# Patient Record
Sex: FEMALE | Race: WHITE | NOT HISPANIC OR LATINO
[De-identification: names, ages, dates, MRNs, and addresses within clinical notes are randomized per-mention and may not be internally consistent; named-entity substitution may affect disease eponyms.]

---

## 2017-01-10 ENCOUNTER — APPOINTMENT (OUTPATIENT)
Dept: OPHTHALMOLOGY | Facility: CLINIC | Age: 57
End: 2017-01-10

## 2017-06-07 ENCOUNTER — MEDICATION RENEWAL (OUTPATIENT)
Age: 57
End: 2017-06-07

## 2017-07-05 ENCOUNTER — RECORD ABSTRACTING (OUTPATIENT)
Age: 57
End: 2017-07-05

## 2017-07-05 DIAGNOSIS — Z87.898 PERSONAL HISTORY OF OTHER SPECIFIED CONDITIONS: ICD-10-CM

## 2017-07-10 ENCOUNTER — NON-APPOINTMENT (OUTPATIENT)
Age: 57
End: 2017-07-10

## 2017-07-10 ENCOUNTER — LABORATORY RESULT (OUTPATIENT)
Age: 57
End: 2017-07-10

## 2017-07-10 ENCOUNTER — APPOINTMENT (OUTPATIENT)
Dept: INTERNAL MEDICINE | Facility: CLINIC | Age: 57
End: 2017-07-10

## 2017-07-10 LAB
BILIRUB UR QL STRIP: NORMAL
CLARITY UR: NORMAL
GLUCOSE UR-MCNC: NORMAL
HCG UR QL: 0.2 EU/DL
HGB UR QL STRIP.AUTO: NORMAL
KETONES UR-MCNC: 15
LEUKOCYTE ESTERASE UR QL STRIP: NORMAL
NITRITE UR QL STRIP: NORMAL
PH UR STRIP: 5
PROT UR STRIP-MCNC: 30
SP GR UR STRIP: 1.02

## 2017-07-11 ENCOUNTER — APPOINTMENT (OUTPATIENT)
Dept: OPHTHALMOLOGY | Facility: CLINIC | Age: 57
End: 2017-07-11

## 2017-07-18 PROBLEM — M25.569 KNEE PAIN: Status: RESOLVED | Noted: 2017-07-05 | Resolved: 2017-07-18

## 2017-07-20 ENCOUNTER — LABORATORY RESULT (OUTPATIENT)
Age: 57
End: 2017-07-20

## 2017-07-20 ENCOUNTER — APPOINTMENT (OUTPATIENT)
Dept: INTERNAL MEDICINE | Facility: CLINIC | Age: 57
End: 2017-07-20

## 2017-07-20 DIAGNOSIS — M25.569 PAIN IN UNSPECIFIED KNEE: ICD-10-CM

## 2017-07-23 LAB
25(OH)D3 SERPL-MCNC: 17.6 NG/ML
25(OH)D3 SERPL-MCNC: 20.7 NG/ML
ALBUMIN SERPL ELPH-MCNC: 4.4 G/DL
ALBUMIN SERPL ELPH-MCNC: 4.9 G/DL
ALP BLD-CCNC: 80 U/L
ALP BLD-CCNC: 87 U/L
ALT SERPL-CCNC: 23 U/L
ALT SERPL-CCNC: 28 U/L
AMYLASE/CREAT SERPL: 51 U/L
ANION GAP SERPL CALC-SCNC: 14 MMOL/L
ANION GAP SERPL CALC-SCNC: 20 MMOL/L
AST SERPL-CCNC: 17 U/L
AST SERPL-CCNC: 23 U/L
BASOPHILS # BLD AUTO: 0.01 K/UL
BASOPHILS # BLD AUTO: 0.01 K/UL
BASOPHILS NFR BLD AUTO: 0.1 %
BASOPHILS NFR BLD AUTO: 0.2 %
BILIRUB SERPL-MCNC: 0.3 MG/DL
BILIRUB SERPL-MCNC: 0.4 MG/DL
BUN SERPL-MCNC: 23 MG/DL
BUN SERPL-MCNC: 27 MG/DL
CALCIUM SERPL-MCNC: 10.5 MG/DL
CALCIUM SERPL-MCNC: 9.7 MG/DL
CHLORIDE SERPL-SCNC: 102 MMOL/L
CHLORIDE SERPL-SCNC: 98 MMOL/L
CHOLEST SERPL-MCNC: 181 MG/DL
CHOLEST SERPL-MCNC: 191 MG/DL
CHOLEST/HDLC SERPL: 3.9 RATIO
CHOLEST/HDLC SERPL: 4 RATIO
CO2 SERPL-SCNC: 23 MMOL/L
CO2 SERPL-SCNC: 23 MMOL/L
CREAT SERPL-MCNC: 0.93 MG/DL
CREAT SERPL-MCNC: 1.34 MG/DL
EOSINOPHIL # BLD AUTO: 0.07 K/UL
EOSINOPHIL # BLD AUTO: 0.07 K/UL
EOSINOPHIL NFR BLD AUTO: 0.9 %
EOSINOPHIL NFR BLD AUTO: 1.4 %
GLUCOSE SERPL-MCNC: 104 MG/DL
GLUCOSE SERPL-MCNC: 93 MG/DL
HBA1C MFR BLD HPLC: 5.8 %
HBA1C MFR BLD HPLC: 5.8 %
HCT VFR BLD CALC: 43.9 %
HCT VFR BLD CALC: 45.6 %
HDLC SERPL-MCNC: 47 MG/DL
HDLC SERPL-MCNC: 48 MG/DL
HGB BLD-MCNC: 14.7 G/DL
HGB BLD-MCNC: 15.1 G/DL
IMM GRANULOCYTES NFR BLD AUTO: 0.2 %
IMM GRANULOCYTES NFR BLD AUTO: 0.3 %
LDLC SERPL CALC-MCNC: 115 MG/DL
LDLC SERPL CALC-MCNC: 118 MG/DL
LPL SERPL-CCNC: 42 U/L
LYMPHOCYTES # BLD AUTO: 1.24 K/UL
LYMPHOCYTES # BLD AUTO: 1.56 K/UL
LYMPHOCYTES NFR BLD AUTO: 20.9 %
LYMPHOCYTES NFR BLD AUTO: 25.2 %
MAN DIFF?: NORMAL
MAN DIFF?: NORMAL
MCHC RBC-ENTMCNC: 28.2 PG
MCHC RBC-ENTMCNC: 28.2 PG
MCHC RBC-ENTMCNC: 33.1 GM/DL
MCHC RBC-ENTMCNC: 33.5 GM/DL
MCV RBC AUTO: 84.3 FL
MCV RBC AUTO: 85.2 FL
MONOCYTES # BLD AUTO: 0.39 K/UL
MONOCYTES # BLD AUTO: 0.48 K/UL
MONOCYTES NFR BLD AUTO: 6.4 %
MONOCYTES NFR BLD AUTO: 7.9 %
NEUTROPHILS # BLD AUTO: 3.2 K/UL
NEUTROPHILS # BLD AUTO: 5.31 K/UL
NEUTROPHILS NFR BLD AUTO: 65.1 %
NEUTROPHILS NFR BLD AUTO: 71.4 %
PLATELET # BLD AUTO: 266 K/UL
PLATELET # BLD AUTO: 287 K/UL
POTASSIUM SERPL-SCNC: 4.1 MMOL/L
POTASSIUM SERPL-SCNC: 4.5 MMOL/L
PROT SERPL-MCNC: 7.6 G/DL
PROT SERPL-MCNC: 8.5 G/DL
RBC # BLD: 5.21 M/UL
RBC # BLD: 5.35 M/UL
RBC # FLD: 13.8 %
RBC # FLD: 14.3 %
SAVE SPECIMEN: NORMAL
SAVE SPECIMEN: NORMAL
SODIUM SERPL-SCNC: 139 MMOL/L
SODIUM SERPL-SCNC: 141 MMOL/L
T3RU NFR SERPL: 0.95 INDEX
T3RU NFR SERPL: 0.95 INDEX
T4 SERPL-MCNC: 11.3 UG/DL
T4 SERPL-MCNC: 11.7 UG/DL
TRIGL SERPL-MCNC: 125 MG/DL
TRIGL SERPL-MCNC: 94 MG/DL
TSH SERPL-ACNC: 2.22 UIU/ML
TSH SERPL-ACNC: 2.5 UIU/ML
URATE SERPL-MCNC: 6.3 MG/DL
URATE SERPL-MCNC: 6.3 MG/DL
WBC # FLD AUTO: 4.92 K/UL
WBC # FLD AUTO: 7.45 K/UL

## 2017-08-30 ENCOUNTER — APPOINTMENT (OUTPATIENT)
Dept: OPHTHALMOLOGY | Facility: CLINIC | Age: 57
End: 2017-08-30

## 2017-11-17 ENCOUNTER — MEDICATION RENEWAL (OUTPATIENT)
Age: 57
End: 2017-11-17

## 2017-11-17 DIAGNOSIS — F41.9 ANXIETY DISORDER, UNSPECIFIED: ICD-10-CM

## 2017-12-07 ENCOUNTER — RX RENEWAL (OUTPATIENT)
Age: 57
End: 2017-12-07

## 2018-01-16 ENCOUNTER — APPOINTMENT (OUTPATIENT)
Dept: OPHTHALMOLOGY | Facility: CLINIC | Age: 58
End: 2018-01-16
Payer: COMMERCIAL

## 2018-01-16 PROCEDURE — 92012 INTRM OPH EXAM EST PATIENT: CPT

## 2018-01-16 PROCEDURE — 92020 GONIOSCOPY: CPT

## 2018-03-12 ENCOUNTER — MEDICATION RENEWAL (OUTPATIENT)
Age: 58
End: 2018-03-12

## 2018-05-03 ENCOUNTER — MEDICATION RENEWAL (OUTPATIENT)
Age: 58
End: 2018-05-03

## 2018-05-04 ENCOUNTER — MEDICATION RENEWAL (OUTPATIENT)
Age: 58
End: 2018-05-04

## 2018-06-01 ENCOUNTER — RX RENEWAL (OUTPATIENT)
Age: 58
End: 2018-06-01

## 2018-07-17 ENCOUNTER — APPOINTMENT (OUTPATIENT)
Dept: OPHTHALMOLOGY | Facility: CLINIC | Age: 58
End: 2018-07-17
Payer: COMMERCIAL

## 2018-07-17 PROCEDURE — 92014 COMPRE OPH EXAM EST PT 1/>: CPT

## 2018-07-17 PROCEDURE — 92012 INTRM OPH EXAM EST PATIENT: CPT

## 2018-07-17 PROCEDURE — 92020 GONIOSCOPY: CPT

## 2018-08-23 ENCOUNTER — MEDICATION RENEWAL (OUTPATIENT)
Age: 58
End: 2018-08-23

## 2018-08-28 ENCOUNTER — MESSAGE (OUTPATIENT)
Age: 58
End: 2018-08-28

## 2019-01-15 ENCOUNTER — APPOINTMENT (OUTPATIENT)
Dept: OPHTHALMOLOGY | Facility: CLINIC | Age: 59
End: 2019-01-15
Payer: COMMERCIAL

## 2019-01-15 DIAGNOSIS — H25.13 AGE-RELATED NUCLEAR CATARACT, BILATERAL: ICD-10-CM

## 2019-01-15 DIAGNOSIS — H40.039 ANATOMICAL NARROW ANGLE, UNSPECIFIED EYE: ICD-10-CM

## 2019-01-15 PROCEDURE — 92020 GONIOSCOPY: CPT | Mod: NC

## 2019-01-15 PROCEDURE — 92012 INTRM OPH EXAM EST PATIENT: CPT | Mod: NC

## 2019-01-15 PROCEDURE — 92012 INTRM OPH EXAM EST PATIENT: CPT

## 2019-01-16 ENCOUNTER — MEDICATION RENEWAL (OUTPATIENT)
Age: 59
End: 2019-01-16

## 2019-01-29 ENCOUNTER — TRANSCRIPTION ENCOUNTER (OUTPATIENT)
Age: 59
End: 2019-01-29

## 2019-02-21 ENCOUNTER — RECORD ABSTRACTING (OUTPATIENT)
Age: 59
End: 2019-02-21

## 2019-02-26 ENCOUNTER — LABORATORY RESULT (OUTPATIENT)
Age: 59
End: 2019-02-26

## 2019-02-26 ENCOUNTER — APPOINTMENT (OUTPATIENT)
Dept: INTERNAL MEDICINE | Facility: CLINIC | Age: 59
End: 2019-02-26
Payer: COMMERCIAL

## 2019-02-26 ENCOUNTER — NON-APPOINTMENT (OUTPATIENT)
Age: 59
End: 2019-02-26

## 2019-02-26 VITALS — DIASTOLIC BLOOD PRESSURE: 70 MMHG | SYSTOLIC BLOOD PRESSURE: 138 MMHG

## 2019-02-26 VITALS — HEIGHT: 64 IN | BODY MASS INDEX: 44.22 KG/M2 | WEIGHT: 259 LBS

## 2019-02-26 VITALS — SYSTOLIC BLOOD PRESSURE: 120 MMHG | DIASTOLIC BLOOD PRESSURE: 70 MMHG

## 2019-02-26 LAB
BILIRUB UR QL STRIP: NORMAL
CLARITY UR: CLEAR
COLLECTION METHOD: NORMAL
GLUCOSE UR-MCNC: NORMAL
HCG UR QL: 0.2 EU/DL
HGB UR QL STRIP.AUTO: NORMAL
KETONES UR-MCNC: NORMAL
LEUKOCYTE ESTERASE UR QL STRIP: NORMAL
NITRITE UR QL STRIP: NORMAL
PH UR STRIP: 5.5
PROT UR STRIP-MCNC: NORMAL
SP GR UR STRIP: 1.02

## 2019-02-26 PROCEDURE — 99396 PREV VISIT EST AGE 40-64: CPT | Mod: 25

## 2019-02-26 PROCEDURE — 90674 CCIIV4 VAC NO PRSV 0.5 ML IM: CPT

## 2019-02-26 PROCEDURE — G0008: CPT

## 2019-02-26 PROCEDURE — 93000 ELECTROCARDIOGRAM COMPLETE: CPT

## 2019-02-26 PROCEDURE — 81003 URINALYSIS AUTO W/O SCOPE: CPT | Mod: QW

## 2019-02-26 PROCEDURE — 36415 COLL VENOUS BLD VENIPUNCTURE: CPT

## 2019-02-26 NOTE — HISTORY OF PRESENT ILLNESS
[FreeTextEntry1] : This is a 58-year-old female for annual health assessment [de-identified] : specifically we will address her history of hypertension hypercholesterolemia thyroid disorder and anxiety\par \par Patient is feeling quite well except for difficulty with joint pain which has been chronic but increased in.\par \par Additionally she has as difficulty with weight\par \par She has difficulty sleeping because of the joint

## 2019-02-26 NOTE — ASSESSMENT
[FreeTextEntry1] : This is a delightful 58-year-old female whose history has been reviewed above\par \par She has a history of hypertension blood pressure is under excellent control she has no orthostasis no medication changes\par \par She has a history of hypothyroidism she remains on Synthroid she is clinically euthyroid a thyroid profile has been obtained medication changes predicated on the results\par \par She has a history of hypercholesterolemia she remains on a statin for cholesterol profile has been obtained medication changes predicated on the results\par \par She has diffuse joint pain this has been chronic she has a family history of inflammatory disease a basic serologic testing has been obtained. I do think she should see a rheumatologist for baseline evaluation\par \par She is up-to-date with OB/GYN mammography she will be given a flu shot. I told her that she should obtain a new shingle shot. She has declined colonoscopy\par \par Historically she did have pancreatitis she had a stent placed because of a divisum. She has been asymptomatic she had a papilloma of the right breast she does followup appropriately\par \par She will do a FIT test\par \par She is up-to-date with bone density

## 2019-02-26 NOTE — HEALTH RISK ASSESSMENT
[Good] : ~his/her~  mood as  good [Two or more falls in past year] : Patient reported two or more falls in the past year [0] : 1) Little interest or pleasure doing things: Not at all (0) [Hepatitis C test offered] : Hepatitis C test offered [None] : None [Alone] : lives alone [Employed] : employed [High School] : high school [Single] : single [Feels Safe at Home] : Feels safe at home [Fully functional (bathing, dressing, toileting, transferring, walking, feeding)] : Fully functional (bathing, dressing, toileting, transferring, walking, feeding) [Fully functional (using the telephone, shopping, preparing meals, housekeeping, doing laundry, using] : Fully functional and needs no help or supervision to perform IADLs (using the telephone, shopping, preparing meals, housekeeping, doing laundry, using transportation, managing medications and managing finances) [Smoke Detector] : smoke detector [Carbon Monoxide Detector] : carbon monoxide detector [Guns at Home] : guns at home [Seat Belt] :  uses seat belt [Sunscreen] : uses sunscreen [] : No [de-identified] : yes [Change in mental status noted] : No change in mental status noted [Sexually Active] : not sexually active [Reports changes in hearing] : Reports no changes in hearing [Reports changes in vision] : Reports no changes in vision [Reports normal functional visual acuity (ie: able to read med bottle)] : Reports poor functional visual acuity.  [Reports changes in dental health] : Reports no changes in dental health [Safety elements used in home] : no safety elements used in home [Travel to Developing Areas] : does not  travel to developing areas [TB Exposure] : is not being exposed to tuberculosis [Caregiver Concerns] : does not have caregiver concerns

## 2019-02-26 NOTE — PHYSICAL EXAM
[No Acute Distress] : no acute distress [Well Nourished] : well nourished [Well Developed] : well developed [Well-Appearing] : well-appearing [Normal Sclera/Conjunctiva] : normal sclera/conjunctiva [PERRL] : pupils equal round and reactive to light [EOMI] : extraocular movements intact [Normal Outer Ear/Nose] : the outer ears and nose were normal in appearance [Normal Oropharynx] : the oropharynx was normal [No JVD] : no jugular venous distention [Supple] : supple [No Lymphadenopathy] : no lymphadenopathy [Thyroid Normal, No Nodules] : the thyroid was normal and there were no nodules present [No Respiratory Distress] : no respiratory distress  [Clear to Auscultation] : lungs were clear to auscultation bilaterally [No Accessory Muscle Use] : no accessory muscle use [Normal Rate] : normal rate  [Regular Rhythm] : with a regular rhythm [Normal S1, S2] : normal S1 and S2 [No Murmur] : no murmur heard [No Carotid Bruits] : no carotid bruits [No Abdominal Bruit] : a ~M bruit was not heard ~T in the abdomen [No Varicosities] : no varicosities [Pedal Pulses Present] : the pedal pulses are present [No Edema] : there was no peripheral edema [No Extremity Clubbing/Cyanosis] : no extremity clubbing/cyanosis [No Palpable Aorta] : no palpable aorta [Soft] : abdomen soft [Non Tender] : non-tender [Non-distended] : non-distended [No HSM] : no HSM [Normal Bowel Sounds] : normal bowel sounds [Normal Posterior Cervical Nodes] : no posterior cervical lymphadenopathy [Normal Anterior Cervical Nodes] : no anterior cervical lymphadenopathy [No CVA Tenderness] : no CVA  tenderness [No Spinal Tenderness] : no spinal tenderness [No Joint Swelling] : no joint swelling [Grossly Normal Strength/Tone] : grossly normal strength/tone [No Rash] : no rash [Normal Gait] : normal gait [Coordination Grossly Intact] : coordination grossly intact [No Focal Deficits] : no focal deficits [Deep Tendon Reflexes (DTR)] : deep tendon reflexes were 2+ and symmetric [Normal Affect] : the affect was normal [Normal Insight/Judgement] : insight and judgment were intact [No Masses] : no palpable masses [No Nipple Discharge] : no nipple discharge [No Axillary Lymphadenopathy] : no axillary lymphadenopathy [No Stool to Guaiac] : no stool to guaiac [Normal Sphincter Tone] : normal sphincter tone [No Mass] : no mass [de-identified] : overweight

## 2019-02-27 DIAGNOSIS — D75.1 SECONDARY POLYCYTHEMIA: ICD-10-CM

## 2019-02-28 ENCOUNTER — TRANSCRIPTION ENCOUNTER (OUTPATIENT)
Age: 59
End: 2019-02-28

## 2019-03-04 ENCOUNTER — MEDICATION RENEWAL (OUTPATIENT)
Age: 59
End: 2019-03-04

## 2019-03-04 LAB
25(OH)D3 SERPL-MCNC: 15.5 NG/ML
ALBUMIN SERPL ELPH-MCNC: 4.9 G/DL
ALP BLD-CCNC: 86 U/L
ALT SERPL-CCNC: 29 U/L
ANION GAP SERPL CALC-SCNC: 16 MMOL/L
AST SERPL-CCNC: 21 U/L
B BURGDOR IGG+IGM SER QL IB: NORMAL
BASOPHILS # BLD AUTO: 0.03 K/UL
BASOPHILS NFR BLD AUTO: 0.4 %
BILIRUB SERPL-MCNC: 0.4 MG/DL
BUN SERPL-MCNC: 27 MG/DL
CALCIUM SERPL-MCNC: 10.5 MG/DL
CCP AB SER IA-ACNC: <8 UNITS
CHLORIDE SERPL-SCNC: 100 MMOL/L
CHOLEST SERPL-MCNC: 196 MG/DL
CHOLEST/HDLC SERPL: 3.7 RATIO
CO2 SERPL-SCNC: 24 MMOL/L
CREAT SERPL-MCNC: 0.92 MG/DL
EOSINOPHIL # BLD AUTO: 0.11 K/UL
EOSINOPHIL NFR BLD AUTO: 1.5 %
FERRITIN SERPL-MCNC: 227 NG/ML
GLUCOSE SERPL-MCNC: 94 MG/DL
HBA1C MFR BLD HPLC: 5.8 %
HCT VFR BLD CALC: 48.3 %
HCV AB SER QL: NONREACTIVE
HCV S/CO RATIO: 0.09 S/CO
HDLC SERPL-MCNC: 53 MG/DL
HGB BLD-MCNC: 15.7 G/DL
IGA SER QL IEP: 105 MG/DL
IMM GRANULOCYTES NFR BLD AUTO: 0.3 %
LDLC SERPL CALC-MCNC: 118 MG/DL
LYMPHOCYTES # BLD AUTO: 1.41 K/UL
LYMPHOCYTES NFR BLD AUTO: 19.3 %
MAN DIFF?: NORMAL
MCHC RBC-ENTMCNC: 28.2 PG
MCHC RBC-ENTMCNC: 32.5 GM/DL
MCV RBC AUTO: 86.9 FL
MONOCYTES # BLD AUTO: 0.55 K/UL
MONOCYTES NFR BLD AUTO: 7.5 %
NEUTROPHILS # BLD AUTO: 5.2 K/UL
NEUTROPHILS NFR BLD AUTO: 71 %
PLATELET # BLD AUTO: 309 K/UL
POTASSIUM SERPL-SCNC: 4.3 MMOL/L
PROT SERPL-MCNC: 8.2 G/DL
RBC # BLD: 5.56 M/UL
RBC # FLD: 13.5 %
RF+CCP IGG SER-IMP: NEGATIVE
RHEUMATOID FACT SER QL: <10 IU/ML
SAVE SPECIMEN: NORMAL
SODIUM SERPL-SCNC: 140 MMOL/L
T3RU NFR SERPL: 1 TBI
T4 SERPL-MCNC: 11.5 UG/DL
THYROGLOB AB SERPL-ACNC: <20 IU/ML
THYROPEROXIDASE AB SERPL IA-ACNC: <10 IU/ML
TRIGL SERPL-MCNC: 124 MG/DL
TSH SERPL-ACNC: 2.75 UIU/ML
URATE SERPL-MCNC: 5.7 MG/DL
VIT B12 SERPL-MCNC: 861 PG/ML
WBC # FLD AUTO: 7.32 K/UL

## 2019-03-05 LAB
EPO SERPL-MCNC: 4.6 MIU/ML
GLIADIN IGA SER QL: 16.5 UNITS
GLIADIN IGG SER QL: <5 UNITS
GLIADIN PEPTIDE IGA SER-ACNC: NEGATIVE
GLIADIN PEPTIDE IGG SER-ACNC: NEGATIVE
TTG IGA SER IA-ACNC: 1.2 U/ML
TTG IGA SER-ACNC: NEGATIVE
TTG IGG SER IA-ACNC: 4.2 U/ML
TTG IGG SER IA-ACNC: NEGATIVE

## 2019-03-06 LAB
ENDOMYSIUM IGA SER QL: NEGATIVE
ENDOMYSIUM IGA TITR SER: NORMAL

## 2019-03-26 ENCOUNTER — RX RENEWAL (OUTPATIENT)
Age: 59
End: 2019-03-26

## 2019-07-02 ENCOUNTER — MEDICATION RENEWAL (OUTPATIENT)
Age: 59
End: 2019-07-02

## 2019-07-16 ENCOUNTER — NON-APPOINTMENT (OUTPATIENT)
Age: 59
End: 2019-07-16

## 2019-07-16 ENCOUNTER — APPOINTMENT (OUTPATIENT)
Dept: OPHTHALMOLOGY | Facility: CLINIC | Age: 59
End: 2019-07-16

## 2019-09-19 ENCOUNTER — APPOINTMENT (OUTPATIENT)
Dept: INTERNAL MEDICINE | Facility: CLINIC | Age: 59
End: 2019-09-19
Payer: COMMERCIAL

## 2019-09-19 ENCOUNTER — LABORATORY RESULT (OUTPATIENT)
Age: 59
End: 2019-09-19

## 2019-09-19 PROCEDURE — 36415 COLL VENOUS BLD VENIPUNCTURE: CPT

## 2019-09-19 PROCEDURE — 77080 DXA BONE DENSITY AXIAL: CPT

## 2019-09-24 LAB
25(OH)D3 SERPL-MCNC: 24.9 NG/ML
ALBUMIN SERPL ELPH-MCNC: 4.6 G/DL
ALP BLD-CCNC: 78 U/L
ALT SERPL-CCNC: 31 U/L
ANION GAP SERPL CALC-SCNC: 13 MMOL/L
AST SERPL-CCNC: 20 U/L
BASOPHILS # BLD AUTO: 0.01 K/UL
BASOPHILS NFR BLD AUTO: 0.2 %
BILIRUB SERPL-MCNC: 0.3 MG/DL
BUN SERPL-MCNC: 21 MG/DL
CALCIUM SERPL-MCNC: 10 MG/DL
CHLORIDE SERPL-SCNC: 101 MMOL/L
CHOLEST SERPL-MCNC: 178 MG/DL
CHOLEST/HDLC SERPL: 3.5 RATIO
CO2 SERPL-SCNC: 27 MMOL/L
CREAT SERPL-MCNC: 0.98 MG/DL
EOSINOPHIL # BLD AUTO: 0.08 K/UL
EOSINOPHIL NFR BLD AUTO: 1.7 %
ESTIMATED AVERAGE GLUCOSE: 117 MG/DL
GLUCOSE SERPL-MCNC: 96 MG/DL
HBA1C MFR BLD HPLC: 5.7 %
HCT VFR BLD CALC: 46.1 %
HDLC SERPL-MCNC: 51 MG/DL
HGB BLD-MCNC: 14.6 G/DL
IMM GRANULOCYTES NFR BLD AUTO: 0.4 %
LDLC SERPL CALC-MCNC: 104 MG/DL
LYMPHOCYTES # BLD AUTO: 1.23 K/UL
LYMPHOCYTES NFR BLD AUTO: 25.8 %
MAN DIFF?: NORMAL
MCHC RBC-ENTMCNC: 27.4 PG
MCHC RBC-ENTMCNC: 31.7 GM/DL
MCV RBC AUTO: 86.5 FL
MONOCYTES # BLD AUTO: 0.42 K/UL
MONOCYTES NFR BLD AUTO: 8.8 %
NEUTROPHILS # BLD AUTO: 3 K/UL
NEUTROPHILS NFR BLD AUTO: 63.1 %
PLATELET # BLD AUTO: 276 K/UL
POTASSIUM SERPL-SCNC: 4.4 MMOL/L
PROT SERPL-MCNC: 7.4 G/DL
RBC # BLD: 5.33 M/UL
RBC # FLD: 13.2 %
SAVE SPECIMEN: NORMAL
SODIUM SERPL-SCNC: 141 MMOL/L
T3RU NFR SERPL: 0.9 TBI
T4 SERPL-MCNC: 11.3 UG/DL
TRIGL SERPL-MCNC: 115 MG/DL
TSH SERPL-ACNC: 1.86 UIU/ML
URATE SERPL-MCNC: 6 MG/DL
WBC # FLD AUTO: 4.76 K/UL

## 2019-10-21 ENCOUNTER — APPOINTMENT (OUTPATIENT)
Dept: INTERNAL MEDICINE | Facility: CLINIC | Age: 59
End: 2019-10-21
Payer: COMMERCIAL

## 2019-10-21 PROCEDURE — 90686 IIV4 VACC NO PRSV 0.5 ML IM: CPT

## 2019-10-21 PROCEDURE — G0008: CPT

## 2019-11-23 ENCOUNTER — INPATIENT (INPATIENT)
Facility: HOSPITAL | Age: 59
LOS: 7 days | Discharge: HOME | End: 2019-12-01
Attending: HOSPITALIST | Admitting: HOSPITALIST
Payer: COMMERCIAL

## 2019-11-23 VITALS
DIASTOLIC BLOOD PRESSURE: 86 MMHG | TEMPERATURE: 97 F | OXYGEN SATURATION: 99 % | HEART RATE: 104 BPM | SYSTOLIC BLOOD PRESSURE: 127 MMHG | RESPIRATION RATE: 18 BRPM

## 2019-11-23 DIAGNOSIS — K86.1 OTHER CHRONIC PANCREATITIS: ICD-10-CM

## 2019-11-23 LAB
APPEARANCE UR: CLEAR — SIGNIFICANT CHANGE UP
BASE EXCESS BLDV CALC-SCNC: 2.1 MMOL/L — SIGNIFICANT CHANGE UP
BILIRUB UR-MCNC: NEGATIVE — SIGNIFICANT CHANGE UP
BLOOD GAS VENOUS - CREATININE: 1.07 MG/DL — SIGNIFICANT CHANGE UP (ref 0.5–1.3)
BLOOD GAS VENOUS - FIO2: 21 — SIGNIFICANT CHANGE UP
BLOOD UR QL VISUAL: NEGATIVE — SIGNIFICANT CHANGE UP
CHLORIDE BLDV-SCNC: 104 MMOL/L — SIGNIFICANT CHANGE UP (ref 96–108)
COLOR SPEC: SIGNIFICANT CHANGE UP
GAS PNL BLDV: 136 MMOL/L — SIGNIFICANT CHANGE UP (ref 136–146)
GLUCOSE BLDV-MCNC: 112 MG/DL — HIGH (ref 70–99)
GLUCOSE UR-MCNC: NEGATIVE — SIGNIFICANT CHANGE UP
HCO3 BLDV-SCNC: 25 MMOL/L — SIGNIFICANT CHANGE UP (ref 20–27)
HCT VFR BLDV CALC: 45.9 % — HIGH (ref 34.5–45)
HGB BLDV-MCNC: 15 G/DL — SIGNIFICANT CHANGE UP (ref 11.5–15.5)
KETONES UR-MCNC: NEGATIVE — SIGNIFICANT CHANGE UP
LACTATE BLDV-MCNC: 1.6 MMOL/L — SIGNIFICANT CHANGE UP (ref 0.5–2)
LEUKOCYTE ESTERASE UR-ACNC: NEGATIVE — SIGNIFICANT CHANGE UP
NITRITE UR-MCNC: NEGATIVE — SIGNIFICANT CHANGE UP
PCO2 BLDV: 39 MMHG — LOW (ref 41–51)
PH BLDV: 7.44 PH — HIGH (ref 7.32–7.43)
PH UR: 5.5 — SIGNIFICANT CHANGE UP (ref 5–8)
PO2 BLDV: 25 MMHG — LOW (ref 35–40)
POTASSIUM BLDV-SCNC: 3.9 MMOL/L — SIGNIFICANT CHANGE UP (ref 3.4–4.5)
PROT UR-MCNC: 10 — SIGNIFICANT CHANGE UP
RBC CASTS # UR COMP ASSIST: SIGNIFICANT CHANGE UP (ref 0–?)
SAO2 % BLDV: 48.5 % — LOW (ref 60–85)
SP GR SPEC: 1.02 — SIGNIFICANT CHANGE UP (ref 1–1.04)
SQUAMOUS # UR AUTO: SIGNIFICANT CHANGE UP
TSH SERPL-MCNC: 0.49 UIU/ML — SIGNIFICANT CHANGE UP (ref 0.27–4.2)
UROBILINOGEN FLD QL: NORMAL — SIGNIFICANT CHANGE UP
WBC UR QL: SIGNIFICANT CHANGE UP (ref 0–?)

## 2019-11-23 PROCEDURE — 74177 CT ABD & PELVIS W/CONTRAST: CPT | Mod: 26

## 2019-11-23 PROCEDURE — 99223 1ST HOSP IP/OBS HIGH 75: CPT

## 2019-11-23 RX ORDER — ONDANSETRON 8 MG/1
4 TABLET, FILM COATED ORAL EVERY 8 HOURS
Refills: 0 | Status: DISCONTINUED | OUTPATIENT
Start: 2019-11-23 | End: 2019-12-01

## 2019-11-23 RX ORDER — ONDANSETRON 8 MG/1
4 TABLET, FILM COATED ORAL ONCE
Refills: 0 | Status: COMPLETED | OUTPATIENT
Start: 2019-11-23 | End: 2019-11-23

## 2019-11-23 RX ORDER — METOCLOPRAMIDE HCL 10 MG
10 TABLET ORAL EVERY 8 HOURS
Refills: 0 | Status: DISCONTINUED | OUTPATIENT
Start: 2019-11-23 | End: 2019-11-24

## 2019-11-23 RX ORDER — PANTOPRAZOLE SODIUM 20 MG/1
40 TABLET, DELAYED RELEASE ORAL DAILY
Refills: 0 | Status: DISCONTINUED | OUTPATIENT
Start: 2019-11-23 | End: 2019-12-01

## 2019-11-23 RX ORDER — SODIUM CHLORIDE 9 MG/ML
2000 INJECTION, SOLUTION INTRAVENOUS
Refills: 0 | Status: DISCONTINUED | OUTPATIENT
Start: 2019-11-23 | End: 2019-11-23

## 2019-11-23 RX ORDER — KETOROLAC TROMETHAMINE 30 MG/ML
15 SYRINGE (ML) INJECTION ONCE
Refills: 0 | Status: DISCONTINUED | OUTPATIENT
Start: 2019-11-23 | End: 2019-11-23

## 2019-11-23 RX ORDER — MORPHINE SULFATE 50 MG/1
4 CAPSULE, EXTENDED RELEASE ORAL ONCE
Refills: 0 | Status: DISCONTINUED | OUTPATIENT
Start: 2019-11-23 | End: 2019-11-23

## 2019-11-23 RX ORDER — SODIUM CHLORIDE 9 MG/ML
1000 INJECTION, SOLUTION INTRAVENOUS
Refills: 0 | Status: DISCONTINUED | OUTPATIENT
Start: 2019-11-23 | End: 2019-11-26

## 2019-11-23 RX ORDER — METOCLOPRAMIDE HCL 10 MG
10 TABLET ORAL ONCE
Refills: 0 | Status: COMPLETED | OUTPATIENT
Start: 2019-11-23 | End: 2019-11-23

## 2019-11-23 RX ORDER — ACETAMINOPHEN 500 MG
1000 TABLET ORAL ONCE
Refills: 0 | Status: COMPLETED | OUTPATIENT
Start: 2019-11-23 | End: 2019-11-23

## 2019-11-23 RX ORDER — DIPHENHYDRAMINE HCL 50 MG
25 CAPSULE ORAL ONCE
Refills: 0 | Status: COMPLETED | OUTPATIENT
Start: 2019-11-23 | End: 2019-11-23

## 2019-11-23 RX ORDER — SODIUM CHLORIDE 9 MG/ML
2000 INJECTION INTRAMUSCULAR; INTRAVENOUS; SUBCUTANEOUS ONCE
Refills: 0 | Status: COMPLETED | OUTPATIENT
Start: 2019-11-23 | End: 2019-11-23

## 2019-11-23 RX ADMIN — MORPHINE SULFATE 4 MILLIGRAM(S): 50 CAPSULE, EXTENDED RELEASE ORAL at 14:39

## 2019-11-23 RX ADMIN — Medication 1000 MILLIGRAM(S): at 20:50

## 2019-11-23 RX ADMIN — Medication 25 MILLIGRAM(S): at 18:25

## 2019-11-23 RX ADMIN — Medication 25 MILLIGRAM(S): at 14:39

## 2019-11-23 RX ADMIN — ONDANSETRON 4 MILLIGRAM(S): 8 TABLET, FILM COATED ORAL at 17:10

## 2019-11-23 RX ADMIN — Medication 15 MILLIGRAM(S): at 22:16

## 2019-11-23 RX ADMIN — Medication 1000 MILLIGRAM(S): at 21:05

## 2019-11-23 RX ADMIN — Medication 400 MILLIGRAM(S): at 20:35

## 2019-11-23 RX ADMIN — MORPHINE SULFATE 4 MILLIGRAM(S): 50 CAPSULE, EXTENDED RELEASE ORAL at 17:10

## 2019-11-23 RX ADMIN — SODIUM CHLORIDE 1000 MILLILITER(S): 9 INJECTION, SOLUTION INTRAVENOUS at 20:58

## 2019-11-23 RX ADMIN — ONDANSETRON 4 MILLIGRAM(S): 8 TABLET, FILM COATED ORAL at 14:39

## 2019-11-23 RX ADMIN — SODIUM CHLORIDE 2000 MILLILITER(S): 9 INJECTION INTRAMUSCULAR; INTRAVENOUS; SUBCUTANEOUS at 14:38

## 2019-11-23 RX ADMIN — MORPHINE SULFATE 4 MILLIGRAM(S): 50 CAPSULE, EXTENDED RELEASE ORAL at 16:23

## 2019-11-23 RX ADMIN — Medication 15 MILLIGRAM(S): at 18:25

## 2019-11-23 RX ADMIN — SODIUM CHLORIDE 2000 MILLILITER(S): 9 INJECTION INTRAMUSCULAR; INTRAVENOUS; SUBCUTANEOUS at 20:31

## 2019-11-23 RX ADMIN — Medication 10 MILLIGRAM(S): at 20:28

## 2019-11-23 NOTE — ED ADULT NURSE NOTE - OBJECTIVE STATEMENT
Patient is a 59y female, A&Ox3, ambulatory at baseline, complaining of upper abdominal pain radiating to the back rated 10  out of 10, nausea and vomiting that started this morning. Denies blood in vomit. PMH of pancreatitis, patient states this feels like a previous time when she had pancreatitis. Patient states she has recently been under increased stress, recent weight loss of 11 pounds within the past 2 weeks. IV initiated, 20 gauge in left antecubital fossa. Bed in lowest locked position. Call bell in reach. Will continue to monitor.

## 2019-11-23 NOTE — ED PROVIDER NOTE - OBJECTIVE STATEMENT
60 yo F pmhx of pancreatitis (denies ETOH), cholecystectomy, HTN, HLD, hypothyroid on synthroid, pw epigastric pain radiating to back with nausea and vomiting since this AM. Pt endorse the pain is similar to prior pancreatitis pain. No CP, SOb, diaphoresis, no neurological symptoms, Of note, pt is allergic to all opiates (itchy).

## 2019-11-23 NOTE — ED PROVIDER NOTE - ATTENDING CONTRIBUTION TO CARE
This is a 58 y/o F PMHx HTN (on Amlodipine), HLD (on Crestor), pancreatitis (s/p cholecystectomy 10 years ago and duct stent s/p removal), s/p thyroid surgery (on Synthroid) p/w epigastric stomach with vomiting today while at work. She works as a  and reports this feels like her pancreatitis in the past. She denies any fevers, chills, chest pain, SOB, urinary complaints, or lower extremity edema. Denies any alcohol use, cigarette usage or high triglycerides.  with /86. Abdomen tender to palpation in epigastrium. Plan- EKG, Labs, including Kaitlin; CXR; CT abdomen/pelvis, Pain control, Antiemetics, IVF hydration, Reassess

## 2019-11-23 NOTE — ED PROVIDER NOTE - PHYSICAL EXAMINATION
General: Patient awake alert NAD.   HEENT: normocephalic, atraumatic, EOMI, no scleral icterus.   Cardiac: RRR, S1, S2, no murmur, rubs, gallop.   LUNGS: CTA B/L no wheeze, rhonchi, rales.   Abdomen: soft + epigastric tenderness, no rebound no guarding, no CVA tenderness.   EXT: Moving all extremities, no edema.    Neuro: A&Ox3, gait normal, no focal neurological deficits, CN 2-12 grossly intact  Skin: warm, dry, no rash, no lesions

## 2019-11-23 NOTE — H&P ADULT - RS GEN PE MLT RESP DETAILS PC
clear to auscultation bilaterally/no rales/no chest wall tenderness/no rhonchi/respirations non-labored/no wheezes

## 2019-11-23 NOTE — ED ADULT NURSE NOTE - NSIMPLEMENTINTERV_GEN_ALL_ED
Implemented All Universal Safety Interventions:  [place] to call system. Call bell, personal items and telephone within reach. Instruct patient to call for assistance. Room bathroom lighting operational. Non-slip footwear when patient is off stretcher. Physically safe environment: no spills, clutter or unnecessary equipment. Stretcher in lowest position, wheels locked, appropriate side rails in place.

## 2019-11-23 NOTE — H&P ADULT - PROBLEM SELECTOR PLAN 1
GI consult, unclear Etiology, possible Idiopathic Pancreatitis , TOX Screen, F/U amylase, Lipase, LFT, CBC, CMP, subset IgG, NPO, IVF LR @ 150 cc/hr, pain control with IV Morphine and IV Benadryl PRN for now, fall/aspiration precaution, IV Zofran PRN, IV Reglan PRN,  Hep A,B,C  profile, GI consult, unclear Etiology, possible Idiopathic Pancreatitis , TOX Screen, F/U amylase, Lipase, LFT, CBC, CMP, subset IgG, NPO, IVF LR @ 150 cc/hr, pain control with IV Morphine and IV Benadryl PRN for now, fall/aspiration precaution, IV Zofran PRN, IV Reglan PRN, IV Protonix,   Hep A,B,C  profile,

## 2019-11-23 NOTE — ED ADULT TRIAGE NOTE - CHIEF COMPLAINT QUOTE
PT C/O ABD pain, nausea and vomiting x 1 day. PMH: HTN, hypothyroid, hypercholesteremia, pancreatitis, cholecystectomy. EKG in progress.

## 2019-11-23 NOTE — H&P ADULT - NSICDXPASTMEDICALHX_GEN_ALL_CORE_FT
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

## 2019-11-23 NOTE — H&P ADULT - PROBLEM SELECTOR PLAN 7
Transitions of Care Status:  1.  Name of PCP: Dr. Couch,   2.  PCP Contacted on Admission: [ ] Y    [ ] N    3.  PCP contacted at Discharge: [ ] Y    [ ] N    [ ] N/A  4.  Post-Discharge Appointment Date and Location:  5.  Summary of Handoff given to PCP:

## 2019-11-23 NOTE — H&P ADULT - NSICDXPASTSURGICALHX_GEN_ALL_CORE_FT
PAST SURGICAL HISTORY:  Hx of cholecystectomy PAST SURGICAL HISTORY:  Hx of cholecystectomy     S/P lumpectomy, left breast     S/P thyroid surgery

## 2019-11-23 NOTE — H&P ADULT - ASSESSMENT
60 y/o Female with  past mhx of pancreatitis (denies ETOH), cholecystectomy, HTN, HLD, hypothyroid on synthroid, Obesity, Anxiety, S/P Thyroid Surgery for Benign Thyroid nodules as per pt, Benign Left Breast Lump removed, No Family HX of Pancreatitis, Last Pancreatitis  attack  13-14 years ago, Possible  Idiopathic Pancreatitis, pt was started recently on Xanax for Anxiety, denies ETOH,  pt presents with  epigastric pain radiating to back with nausea and vomiting since this AM. Pt endorses  the pain is similar to prior pancreatitis pain. No CP, NO SOB, NO diaphoresis, no neurological symptoms, No HA, no dizziness, no dysuria, no diarrhea, last BM today, No recent travel, no cough, no rash, pt works as , no Chemical Contacts, pt awake, A+O x 4, accompanied with her sister, Lipase > 3000, , CT A/P with IV Contrast C/W Acute interstitial edematous Pancreatitis, No Necrosis, No thrombus, No Fluid collection, + Fibroid Uterus,  Of note, pt is allergic to all opiates (itchy). Pt S/P Morphine 4 mg IVP x 2, with IV Benadryl 25 mg IVP x 2, pt had some itching, no Rash, no other reaction reported, S/P Toradol 15 mg IVP x 2, Reglan 10 mg IVP x 1, Zofran 4 mg IVP x 1, on IVF LR @ 150 cc/hr, S/P IVF NS Bolus x 2 Lit., Last EGD 13-14 Years ago as per pt , pt has never had Colonoscopy,

## 2019-11-23 NOTE — H&P ADULT - HISTORY OF PRESENT ILLNESS
58 y/o Female with  past mhx of pancreatitis (denies ETOH), cholecystectomy, HTN, HLD, hypothyroid on synthroid, Obesity, Anxiety, S/P Thyroid Surgery for Benign Thyroid nodules as per pt, Benign Left Breast Lump removed, No Family HX of Pancreatitis, Last Pancreatitis  attack  13-14 years ago, Possible  Idiopathic Pancreatitis, pt was started recently on Xanax for Anxiety, denies ETOH,  pt presents with  epigastric pain radiating to back with nausea and vomiting since this AM. Pt endorses  the pain is similar to prior pancreatitis pain. No CP, NO SOB, NO diaphoresis, no neurological symptoms, No HA, no dizziness, no dysuria, no diarrhea, last BM today, No recent travel, no cough, no rash, pt works as , no Chemical Contacts, pt awake, A+O x 4, accompanied with her sister, Lipase > 3000, , CT A/P with IV Contrast C/W Acute interstitial edematous Pancreatitis, No Necrosis, No thrombus, No Fluid collection, + Fibroid Uterus,  Of note, pt is allergic to all opiates (itchy). Pt S/P Morphine 4 mg IVP x 2, with IV Benadryl 25 mg IVP x 2, pt had some itching, no Rash, no other reaction reported, S/P Toradol 15 mg IVP x 2, Reglan 10 mg IVP x 1, Zofran 4 mg IVP x 1, on IVF LR @ 150 cc/hr, S/P IVF NS Bolus x 2 Lit., Last EGD 13-14 Years ago as per pt , pt has never had Colonoscopy,     Labs: UA: Protein 10, Lactate 1.6, Na 140, K+ 3.8, BUN 23, Creatinine 0.93, Glucose 122, LFT Normal, Troponin HS = 7, PTT 34.3, PT 11.9, INR 1.04, Lipase > 3000, Cholesterol  188, , TSH 0.49,     Vitals: Tem 98, HR 93, /76, RR 17, 100% RA 60 y/o Female with  past mhx of pancreatitis (denies ETOH), cholecystectomy, HTN, HLD, hypothyroid on synthroid, Obesity, Anxiety, S/P Thyroid Surgery for Benign Thyroid nodules as per pt, Benign Left Breast Lump removed, No Family HX of Pancreatitis, Last Pancreatitis  attack  13-14 years ago, Possible  Idiopathic Pancreatitis, pt was started recently on Xanax for Anxiety, denies ETOH,  pt presents with  epigastric pain radiating to back with nausea and vomiting since this AM. Pt endorses  the pain is similar to prior pancreatitis pain. No CP, NO SOB, NO diaphoresis, no neurological symptoms, No HA, no dizziness, no dysuria, no diarrhea, last BM today, No recent travel, no cough, no rash, pt works as , no Chemical Contacts, pt awake, A+O x 4, accompanied with her sister, Lipase > 3000, , CT A/P with IV Contrast C/W Acute interstitial edematous Pancreatitis, No Necrosis, No thrombus, No Fluid collection, + Fibroid Uterus,  Of note, pt is allergic to all opiates (itchy). Pt S/P Morphine 4 mg IVP x 2, with IV Benadryl 25 mg IVP x 2, pt had some itching, no Rash, no other reaction reported, S/P Toradol 15 mg IVP x 2, Reglan 10 mg IVP x 1, Zofran 4 mg IVP x 1, on IVF LR @ 150 cc/hr, S/P IVF NS Bolus x 2 Lit., Last EGD 13-14 Years ago as per pt , pt has never had Colonoscopy,     Labs: UA: Protein 10, Lactate 1.6, Na 140, K+ 3.8, BUN 23, Creatinine 0.93, Glucose 122, LFT Normal, Troponin HS = 7, PTT 34.3, PT 11.9, INR 1.04, Lipase > 3000, Cholesterol  188, , TSH 0.49, Amylase 225,     Vitals: Tem 98, HR 93, /76, RR 17, 100% RA

## 2019-11-23 NOTE — H&P ADULT - MUSCULOSKELETAL
details… detailed exam normal strength/no calf tenderness/ROM intact/no joint swelling/no joint erythema/no joint warmth

## 2019-11-23 NOTE — ED PROVIDER NOTE - CLINICAL SUMMARY MEDICAL DECISION MAKING FREE TEXT BOX
58 yo F hx of pancreatitis, pw epigastric pain radiating to back, similar to prior pancreatitis. Most likely pancreatitis (elevated triglyceride? due to no ETOH and gallstones), obtain CT abd. Low suspicion for dissection due given no neurological symptoms and similar pain to prior pancreatitis and persistent nausea and vomiting. lipase, labs, CT abdomen, likely admission.

## 2019-11-23 NOTE — ED PROVIDER NOTE - NS ED ROS FT
GENERAL: No fever, chills  EYES: no vision changes, no discharge.   HEENT: no difficulty swallowing or speaking   CARDIAC: no chest pain/pressure, SOB, lower ex edema  PULMONARY: no cough, SOB  GI: + abdominal pain, n/v no d/c  : no dysuria, frequency, hematuria  SKIN: no rashes, lesions, vesicles  NEURO: no headache, lightheadedness, paraesthesias.   MSK: No joint pain, myalgia, weakness.

## 2019-11-23 NOTE — ED PROVIDER NOTE - PMH
Female breast neoplasm    H/O: hypothyroidism    Hyperlipidemia    Hypertension    Superficial Phlebitis

## 2019-11-24 ENCOUNTER — TRANSCRIPTION ENCOUNTER (OUTPATIENT)
Age: 59
End: 2019-11-24

## 2019-11-24 DIAGNOSIS — Z98.890 OTHER SPECIFIED POSTPROCEDURAL STATES: Chronic | ICD-10-CM

## 2019-11-24 DIAGNOSIS — Z02.9 ENCOUNTER FOR ADMINISTRATIVE EXAMINATIONS, UNSPECIFIED: ICD-10-CM

## 2019-11-24 DIAGNOSIS — E78.5 HYPERLIPIDEMIA, UNSPECIFIED: ICD-10-CM

## 2019-11-24 DIAGNOSIS — F41.9 ANXIETY DISORDER, UNSPECIFIED: ICD-10-CM

## 2019-11-24 DIAGNOSIS — K85.90 ACUTE PANCREATITIS WITHOUT NECROSIS OR INFECTION, UNSPECIFIED: ICD-10-CM

## 2019-11-24 DIAGNOSIS — Z86.39 PERSONAL HISTORY OF OTHER ENDOCRINE, NUTRITIONAL AND METABOLIC DISEASE: ICD-10-CM

## 2019-11-24 DIAGNOSIS — I10 ESSENTIAL (PRIMARY) HYPERTENSION: ICD-10-CM

## 2019-11-24 DIAGNOSIS — Z29.9 ENCOUNTER FOR PROPHYLACTIC MEASURES, UNSPECIFIED: ICD-10-CM

## 2019-11-24 LAB
ALBUMIN SERPL ELPH-MCNC: 3.6 G/DL — SIGNIFICANT CHANGE UP (ref 3.3–5)
ALP SERPL-CCNC: 65 U/L — SIGNIFICANT CHANGE UP (ref 40–120)
ALT FLD-CCNC: 34 U/L — HIGH (ref 4–33)
AMPHET UR-MCNC: NEGATIVE — SIGNIFICANT CHANGE UP
AMYLASE P1 CFR SERPL: 225 U/L — HIGH (ref 25–125)
ANION GAP SERPL CALC-SCNC: 10 MMO/L — SIGNIFICANT CHANGE UP (ref 7–14)
APAP SERPL-MCNC: < 15 UG/ML — LOW (ref 15–25)
APTT BLD: 34.3 SEC — SIGNIFICANT CHANGE UP (ref 27.5–36.3)
AST SERPL-CCNC: 29 U/L — SIGNIFICANT CHANGE UP (ref 4–32)
BARBITURATES UR SCN-MCNC: NEGATIVE — SIGNIFICANT CHANGE UP
BENZODIAZ UR-MCNC: NEGATIVE — SIGNIFICANT CHANGE UP
BILIRUB SERPL-MCNC: 0.5 MG/DL — SIGNIFICANT CHANGE UP (ref 0.2–1.2)
BUN SERPL-MCNC: 10 MG/DL — SIGNIFICANT CHANGE UP (ref 7–23)
CALCIUM SERPL-MCNC: 8.6 MG/DL — SIGNIFICANT CHANGE UP (ref 8.4–10.5)
CANNABINOIDS UR-MCNC: NEGATIVE — SIGNIFICANT CHANGE UP
CHLORIDE SERPL-SCNC: 104 MMOL/L — SIGNIFICANT CHANGE UP (ref 98–107)
CO2 SERPL-SCNC: 22 MMOL/L — SIGNIFICANT CHANGE UP (ref 22–31)
COCAINE METAB.OTHER UR-MCNC: NEGATIVE — SIGNIFICANT CHANGE UP
CREAT SERPL-MCNC: 0.75 MG/DL — SIGNIFICANT CHANGE UP (ref 0.5–1.3)
ETHANOL BLD-MCNC: < 10 MG/DL — SIGNIFICANT CHANGE UP
GLUCOSE SERPL-MCNC: 114 MG/DL — HIGH (ref 70–99)
INR BLD: 1.04 — SIGNIFICANT CHANGE UP (ref 0.88–1.17)
MAGNESIUM SERPL-MCNC: 1.9 MG/DL — SIGNIFICANT CHANGE UP (ref 1.6–2.6)
METHADONE UR-MCNC: NEGATIVE — SIGNIFICANT CHANGE UP
OPIATES UR-MCNC: POSITIVE — SIGNIFICANT CHANGE UP
OXYCODONE UR-MCNC: NEGATIVE — SIGNIFICANT CHANGE UP
PCP UR-MCNC: NEGATIVE — SIGNIFICANT CHANGE UP
PHOSPHATE SERPL-MCNC: 2.9 MG/DL — SIGNIFICANT CHANGE UP (ref 2.5–4.5)
POTASSIUM SERPL-MCNC: 4.2 MMOL/L — SIGNIFICANT CHANGE UP (ref 3.5–5.3)
POTASSIUM SERPL-SCNC: 4.2 MMOL/L — SIGNIFICANT CHANGE UP (ref 3.5–5.3)
PROT SERPL-MCNC: 6.6 G/DL — SIGNIFICANT CHANGE UP (ref 6–8.3)
PROTHROM AB SERPL-ACNC: 11.9 SEC — SIGNIFICANT CHANGE UP (ref 9.8–13.1)
SALICYLATES SERPL-MCNC: < 5 MG/DL — LOW (ref 15–30)
SODIUM SERPL-SCNC: 136 MMOL/L — SIGNIFICANT CHANGE UP (ref 135–145)

## 2019-11-24 PROCEDURE — 99233 SBSQ HOSP IP/OBS HIGH 50: CPT | Mod: GC

## 2019-11-24 RX ORDER — DIPHENHYDRAMINE HCL 50 MG
25 CAPSULE ORAL EVERY 4 HOURS
Refills: 0 | Status: DISCONTINUED | OUTPATIENT
Start: 2019-11-24 | End: 2019-12-01

## 2019-11-24 RX ORDER — SENNA PLUS 8.6 MG/1
2 TABLET ORAL AT BEDTIME
Refills: 0 | Status: DISCONTINUED | OUTPATIENT
Start: 2019-11-24 | End: 2019-12-01

## 2019-11-24 RX ORDER — SIMVASTATIN 20 MG/1
20 TABLET, FILM COATED ORAL AT BEDTIME
Refills: 0 | Status: DISCONTINUED | OUTPATIENT
Start: 2019-11-24 | End: 2019-12-01

## 2019-11-24 RX ORDER — HEPARIN SODIUM 5000 [USP'U]/ML
5000 INJECTION INTRAVENOUS; SUBCUTANEOUS EVERY 8 HOURS
Refills: 0 | Status: DISCONTINUED | OUTPATIENT
Start: 2019-11-24 | End: 2019-11-24

## 2019-11-24 RX ORDER — ALPRAZOLAM 0.25 MG
0.25 TABLET ORAL THREE TIMES A DAY
Refills: 0 | Status: DISCONTINUED | OUTPATIENT
Start: 2019-11-24 | End: 2019-12-01

## 2019-11-24 RX ORDER — LEVOTHYROXINE SODIUM 125 MCG
175 TABLET ORAL DAILY
Refills: 0 | Status: DISCONTINUED | OUTPATIENT
Start: 2019-11-24 | End: 2019-12-01

## 2019-11-24 RX ORDER — DIPHENHYDRAMINE HCL 50 MG
25 CAPSULE ORAL ONCE
Refills: 0 | Status: COMPLETED | OUTPATIENT
Start: 2019-11-24 | End: 2019-11-24

## 2019-11-24 RX ORDER — AMLODIPINE BESYLATE 2.5 MG/1
10 TABLET ORAL DAILY
Refills: 0 | Status: DISCONTINUED | OUTPATIENT
Start: 2019-11-24 | End: 2019-12-01

## 2019-11-24 RX ORDER — MORPHINE SULFATE 50 MG/1
2 CAPSULE, EXTENDED RELEASE ORAL EVERY 4 HOURS
Refills: 0 | Status: DISCONTINUED | OUTPATIENT
Start: 2019-11-24 | End: 2019-12-01

## 2019-11-24 RX ORDER — ENOXAPARIN SODIUM 100 MG/ML
40 INJECTION SUBCUTANEOUS DAILY
Refills: 0 | Status: DISCONTINUED | OUTPATIENT
Start: 2019-11-24 | End: 2019-12-01

## 2019-11-24 RX ORDER — MORPHINE SULFATE 50 MG/1
4 CAPSULE, EXTENDED RELEASE ORAL ONCE
Refills: 0 | Status: DISCONTINUED | OUTPATIENT
Start: 2019-11-24 | End: 2019-11-24

## 2019-11-24 RX ORDER — ACETAMINOPHEN 500 MG
650 TABLET ORAL EVERY 6 HOURS
Refills: 0 | Status: DISCONTINUED | OUTPATIENT
Start: 2019-11-24 | End: 2019-12-01

## 2019-11-24 RX ADMIN — Medication 25 MILLIGRAM(S): at 11:39

## 2019-11-24 RX ADMIN — MORPHINE SULFATE 2 MILLIGRAM(S): 50 CAPSULE, EXTENDED RELEASE ORAL at 21:03

## 2019-11-24 RX ADMIN — SODIUM CHLORIDE 150 MILLILITER(S): 9 INJECTION, SOLUTION INTRAVENOUS at 00:10

## 2019-11-24 RX ADMIN — MORPHINE SULFATE 2 MILLIGRAM(S): 50 CAPSULE, EXTENDED RELEASE ORAL at 11:39

## 2019-11-24 RX ADMIN — Medication 175 MICROGRAM(S): at 05:36

## 2019-11-24 RX ADMIN — MORPHINE SULFATE 2 MILLIGRAM(S): 50 CAPSULE, EXTENDED RELEASE ORAL at 11:54

## 2019-11-24 RX ADMIN — SENNA PLUS 2 TABLET(S): 8.6 TABLET ORAL at 21:22

## 2019-11-24 RX ADMIN — SODIUM CHLORIDE 150 MILLILITER(S): 9 INJECTION, SOLUTION INTRAVENOUS at 14:04

## 2019-11-24 RX ADMIN — AMLODIPINE BESYLATE 10 MILLIGRAM(S): 2.5 TABLET ORAL at 05:36

## 2019-11-24 RX ADMIN — SODIUM CHLORIDE 150 MILLILITER(S): 9 INJECTION, SOLUTION INTRAVENOUS at 20:37

## 2019-11-24 RX ADMIN — MORPHINE SULFATE 4 MILLIGRAM(S): 50 CAPSULE, EXTENDED RELEASE ORAL at 06:00

## 2019-11-24 RX ADMIN — MORPHINE SULFATE 4 MILLIGRAM(S): 50 CAPSULE, EXTENDED RELEASE ORAL at 05:40

## 2019-11-24 RX ADMIN — PANTOPRAZOLE SODIUM 40 MILLIGRAM(S): 20 TABLET, DELAYED RELEASE ORAL at 11:32

## 2019-11-24 RX ADMIN — Medication 25 MILLIGRAM(S): at 05:39

## 2019-11-24 RX ADMIN — ENOXAPARIN SODIUM 40 MILLIGRAM(S): 100 INJECTION SUBCUTANEOUS at 11:33

## 2019-11-24 RX ADMIN — SIMVASTATIN 20 MILLIGRAM(S): 20 TABLET, FILM COATED ORAL at 21:22

## 2019-11-24 RX ADMIN — HEPARIN SODIUM 5000 UNIT(S): 5000 INJECTION INTRAVENOUS; SUBCUTANEOUS at 05:36

## 2019-11-24 RX ADMIN — MORPHINE SULFATE 2 MILLIGRAM(S): 50 CAPSULE, EXTENDED RELEASE ORAL at 21:18

## 2019-11-24 RX ADMIN — Medication 10 MILLIGRAM(S): at 05:36

## 2019-11-24 RX ADMIN — Medication 25 MILLIGRAM(S): at 21:02

## 2019-11-24 NOTE — PROGRESS NOTE ADULT - PROBLEM SELECTOR PLAN 1
unclear Etiology (no etoh use, no hx of gallstones s/p cholecystectomy), possible Idiopathic Pancreatitis. s/p 2l NS & 1L IVF LR @ 150 cc/hr. morphine 4mg x2 & toradol 15mg x2. sxs improved  -pain control with IV Morphine and IV Benadryl PRN for now  -IV Zofran PRN,   -IV Reglan PRN,   -IV Protonix,   -Hep A,B,C  profile, f/u  -NPO until pain resolves  -GI emailed. f/u recs unclear Etiology (no etoh use, no hx of gallstones s/p cholecystectomy), possible Idiopathic Pancreatitis. s/p 2l NS & 1L IVF LR @ 150 cc/hr. morphine 4mg x2 & toradol 15mg x2. sxs improved  -pain control with IV Morphine and IV Benadryl PRN for now  -IV Zofran PRN,   -IV Protonix,   -NPO until pain resolves. advance diet as tolerated  -f/u RUQ u/s unclear Etiology (no etoh use, no hx of gallstones s/p cholecystectomy), possible Idiopathic Pancreatitis. s/p 2l NS & 1L IVF LR @ 150 cc/hr. morphine 4mg x2 & toradol 15mg x2. sxs improved  -pain control with IV Morphine and IV Benadryl PRN for now  -IV Zofran PRN  -IV Protonix,   -NPO until pain resolves. advance diet as tolerated  -f/u RUQ u/s  -check IgG levels  -triglyceride levels WNL

## 2019-11-24 NOTE — PROGRESS NOTE ADULT - SUBJECTIVE AND OBJECTIVE BOX
Justina Mccallum (Giovani) PGY-1  Pager: NS) [phone]/ ([phone]    Patient is a 59y old  Female who presents with a chief complaint of Acute Pancreatitis, Epigastric pain, + N/V, (2019 22:49)      SUBJECTIVE / OVERNIGHT EVENTS:  No acute complaints over night. Denies any fevers/chills, headache, CP, SOB,  N/V/D, constipation, or leg swelling.   last time she vomited was yesterday. no nausea. reports 7/10 abd pain.       MEDICATIONS  (STANDING):  amLODIPine   Tablet 10 milliGRAM(s) Oral daily  heparin  Injectable 5000 Unit(s) SubCutaneous every 8 hours  lactated ringers. 1000 milliLiter(s) (150 mL/Hr) IV Continuous <Continuous>  levothyroxine 175 MICROGram(s) Oral daily  pantoprazole  Injectable 40 milliGRAM(s) IV Push daily  simvastatin 20 milliGRAM(s) Oral at bedtime    MEDICATIONS  (PRN):  metoclopramide Injectable 10 milliGRAM(s) IV Push every 8 hours PRN Nausea  ondansetron Injectable 4 milliGRAM(s) IV Push every 8 hours PRN Nausea and/or Vomiting          OBJECTIVE:  Vital Signs Last 24 Hrs  T(C): 36.7 (2019 05:33), Max: 37.2 (2019 00:01)  T(F): 98 (2019 05:33), Max: 98.9 (2019 00:01)  HR: 96 (2019 05:33) (90 - 104)  BP: 136/83 (2019 05:33) (122/71 - 143/82)  BP(mean): --  RR: 16 (2019 05:33) (15 - 18)  SpO2: 97% (2019 05:33) (97% - 100%)    PHYSICAL EXAM:  GENERAL: NAD, well-developed  HEAD:  Atraumatic, Normocephalic  NECK: Supple, No JVD  CHEST/LUNG: Clear to auscultation bilaterally; No wheeze  HEART: Regular rate and rhythm; No murmurs, rubs, or gallops  ABDOMEN: Soft, tender to palpation at epigastric area , Nondistended; Bowel sounds present  EXTREMITIES:  No clubbing, cyanosis, or edema  PSYCH: AAOx3  NEUROLOGY: non-focal  SKIN: No rashes or lesions    CAPILLARY BLOOD GLUCOSE        I&O's Summary            LABS:                        15.6   12.50 )-----------( 279      ( 2019 13:45 )             46.9         140  |  102  |  23  ----------------------------<  122<H>  3.8   |  22  |  0.93    Ca    9.8      2019 13:45    TPro  7.6  /  Alb  4.6  /  TBili  0.3  /  DBili  x   /  AST  21  /  ALT  25  /  AlkPhos  75      PT/INR - ( 2019 23:58 )   PT: 11.9 SEC;   INR: 1.04          PTT - ( 2019 23:58 )  PTT:34.3 SEC      Urinalysis Basic - ( 2019 15:55 )    Color: LT. YELLOW / Appearance: CLEAR / S.021 / pH: 5.5  Gluc: NEGATIVE / Ketone: NEGATIVE  / Bili: NEGATIVE / Urobili: NORMAL   Blood: NEGATIVE / Protein: 10 / Nitrite: NEGATIVE   Leuk Esterase: NEGATIVE / RBC: 0-2 / WBC 0-2   Sq Epi: OCC / Non Sq Epi: x / Bacteria: x          RADIOLOGY & ADDITIONAL TESTS:

## 2019-11-24 NOTE — DISCHARGE NOTE PROVIDER - NSDCCPCAREPLAN_GEN_ALL_CORE_FT
PRINCIPAL DISCHARGE DIAGNOSIS  Diagnosis: Pancreatitis, chronic  Assessment and Plan of Treatment: You were found to have pancreatitis, which is an inflammation of your pancreas. You had a previous episode of pancreatitis 13 years ago. The cause of your pancreatitis is not fully understood. Your symptoms and clinical status improved with hydration and pain control. You should continue to advance your diet as you are able. You should follow up with your primary care doctor. Your MRCP demonstrated X. If you experience severe pain, you should return to the emergency department or call your primary doctor.      SECONDARY DISCHARGE DIAGNOSES  Diagnosis: Shortness of breath  Assessment and Plan of Treatment: During your stay you experienced shortness of breath. Your breathing status improved with respiratory treatments and diuresis. If you experience similar simptoms, please discuss them with your primary care doctor.    Diagnosis: Transaminitis  Assessment and Plan of Treatment: You were found to have a mild elevation in your liver enzymes. This was likely as a result of the pancreatitis. Your liver enzyme levels improved. You should follow up with your primary care doctor in the next week or two who can repeat blood work to assess your liver function. PRINCIPAL DISCHARGE DIAGNOSIS  Diagnosis: Pancreatitis, chronic  Assessment and Plan of Treatment: You were found to have pancreatitis, which is an inflammation of your pancreas. You had a previous episode of pancreatitis 13 years ago. The cause of your pancreatitis is not fully understood. Your symptoms and clinical status improved with hydration and pain control. You should continue to advance your diet as you are able. You should follow up with your primary care doctor. Your MRCP demonstrated pancreatitis. If you experience severe pain, you should return to the emergency department or call your primary doctor.      SECONDARY DISCHARGE DIAGNOSES  Diagnosis: Shortness of breath  Assessment and Plan of Treatment: During your stay you experienced shortness of breath. Your breathing status improved with respiratory treatments and diuresis. If you experience similar simptoms, please discuss them with your primary care doctor.    Diagnosis: Transaminitis  Assessment and Plan of Treatment: You were found to have a mild elevation in your liver enzymes. This was likely as a result of the pancreatitis. Your liver enzyme levels improved. You should follow up with your primary care doctor in the next week or two who can repeat blood work to assess your liver function.

## 2019-11-24 NOTE — PROGRESS NOTE ADULT - PROBLEM SELECTOR PLAN 7
Transitions of Care Status:  1.  Name of PCP: Dr. Couch,   2.  PCP Contacted on Admission: [ ] Y    [ ] N    3.  PCP contacted at Discharge: [ ] Y    [ ] N    [ ] N/A  4.  Post-Discharge Appointment Date and Location:  5.  Summary of Handoff given to PCP:  Dispo: pending clinical improvement Transitions of Care Status:  1.  Name of PCP: Dr. Couch  2.  PCP Contacted on Admission: [ ] Y    [X] N- admitted overnight  3.  PCP contacted at Discharge: [ ] Y    [ ] N    [ ] N/A  4.  Post-Discharge Appointment Date and Location:  5.  Summary of Handoff given to PCP:  Dispo: pending clinical improvement

## 2019-11-24 NOTE — DISCHARGE NOTE PROVIDER - NSDCMRMEDTOKEN_GEN_ALL_CORE_FT
Norvasc 10 mg oral tablet: 1 tab(s) orally once a day  Synthroid 175 mcg (0.175 mg) oral tablet: 1 tab(s) orally once a day  Xanax 0.5 mg oral tablet: 1 tab(s) orally once a day (at bedtime)  Zocor 20 mg oral tablet: 1 tab(s) orally once a day (at bedtime)

## 2019-11-24 NOTE — DISCHARGE NOTE PROVIDER - CARE PROVIDER_API CALL
Rohit Couch)  Internal Medicine; Nephrology  [address] [facility], [address] [place] [address]  Phone: [phone]  Fax: [phone]  Established Patient  Follow Up Time: 1 week

## 2019-11-24 NOTE — PROGRESS NOTE ADULT - ASSESSMENT
58 y/o Female with hx of pancreatitis (denies ETOH), cholecystectomy, HTN, HLD, hypothyroid on synthroid S/P thyroid Surgery for Benign Thyroid nodules , Obesity, Anxiety, presents with acute epigastric pain radiating to back with nausea and vomiting found to have pancreatitis.

## 2019-11-24 NOTE — DISCHARGE NOTE PROVIDER - NSDCFUSCHEDAPPT_GEN_ALL_CORE_FT
DANY HENDERSON ; 01/14/2020 ; [facility] [address]  DANY HENDERSON ; 01/14/2020 ; [facility] [address]

## 2019-11-25 LAB
ANION GAP SERPL CALC-SCNC: 12 MMO/L — SIGNIFICANT CHANGE UP (ref 7–14)
BUN SERPL-MCNC: 12 MG/DL — SIGNIFICANT CHANGE UP (ref 7–23)
CALCIUM SERPL-MCNC: 8.6 MG/DL — SIGNIFICANT CHANGE UP (ref 8.4–10.5)
CHLORIDE SERPL-SCNC: 105 MMOL/L — SIGNIFICANT CHANGE UP (ref 98–107)
CO2 SERPL-SCNC: 20 MMOL/L — LOW (ref 22–31)
CREAT SERPL-MCNC: 0.83 MG/DL — SIGNIFICANT CHANGE UP (ref 0.5–1.3)
GLUCOSE SERPL-MCNC: 103 MG/DL — HIGH (ref 70–99)
HCT VFR BLD CALC: 43.3 % — SIGNIFICANT CHANGE UP (ref 34.5–45)
HGB BLD-MCNC: 14.7 G/DL — SIGNIFICANT CHANGE UP (ref 11.5–15.5)
MAGNESIUM SERPL-MCNC: 1.8 MG/DL — SIGNIFICANT CHANGE UP (ref 1.6–2.6)
MCHC RBC-ENTMCNC: 28.9 PG — SIGNIFICANT CHANGE UP (ref 27–34)
MCHC RBC-ENTMCNC: 33.9 % — SIGNIFICANT CHANGE UP (ref 32–36)
MCV RBC AUTO: 85.2 FL — SIGNIFICANT CHANGE UP (ref 80–100)
NRBC # FLD: 0 K/UL — SIGNIFICANT CHANGE UP (ref 0–0)
PHOSPHATE SERPL-MCNC: 2.6 MG/DL — SIGNIFICANT CHANGE UP (ref 2.5–4.5)
PLATELET # BLD AUTO: 264 K/UL — SIGNIFICANT CHANGE UP (ref 150–400)
PMV BLD: 9.9 FL — SIGNIFICANT CHANGE UP (ref 7–13)
POTASSIUM SERPL-MCNC: 3.6 MMOL/L — SIGNIFICANT CHANGE UP (ref 3.5–5.3)
POTASSIUM SERPL-SCNC: 3.6 MMOL/L — SIGNIFICANT CHANGE UP (ref 3.5–5.3)
RBC # BLD: 5.08 M/UL — SIGNIFICANT CHANGE UP (ref 3.8–5.2)
RBC # FLD: 14.1 % — SIGNIFICANT CHANGE UP (ref 10.3–14.5)
SODIUM SERPL-SCNC: 137 MMOL/L — SIGNIFICANT CHANGE UP (ref 135–145)
WBC # BLD: 15.99 K/UL — HIGH (ref 3.8–10.5)
WBC # FLD AUTO: 15.99 K/UL — HIGH (ref 3.8–10.5)

## 2019-11-25 PROCEDURE — 76705 ECHO EXAM OF ABDOMEN: CPT | Mod: 26

## 2019-11-25 PROCEDURE — 99233 SBSQ HOSP IP/OBS HIGH 50: CPT | Mod: GC

## 2019-11-25 RX ORDER — LANOLIN ALCOHOL/MO/W.PET/CERES
3 CREAM (GRAM) TOPICAL ONCE
Refills: 0 | Status: COMPLETED | OUTPATIENT
Start: 2019-11-25 | End: 2019-11-25

## 2019-11-25 RX ORDER — POLYETHYLENE GLYCOL 3350 17 G/17G
17 POWDER, FOR SOLUTION ORAL ONCE
Refills: 0 | Status: COMPLETED | OUTPATIENT
Start: 2019-11-25 | End: 2019-11-25

## 2019-11-25 RX ADMIN — SIMVASTATIN 20 MILLIGRAM(S): 20 TABLET, FILM COATED ORAL at 22:45

## 2019-11-25 RX ADMIN — Medication 175 MICROGRAM(S): at 06:17

## 2019-11-25 RX ADMIN — Medication 25 MILLIGRAM(S): at 19:05

## 2019-11-25 RX ADMIN — Medication 25 MILLIGRAM(S): at 13:36

## 2019-11-25 RX ADMIN — ONDANSETRON 4 MILLIGRAM(S): 8 TABLET, FILM COATED ORAL at 13:36

## 2019-11-25 RX ADMIN — SENNA PLUS 2 TABLET(S): 8.6 TABLET ORAL at 22:45

## 2019-11-25 RX ADMIN — AMLODIPINE BESYLATE 10 MILLIGRAM(S): 2.5 TABLET ORAL at 06:17

## 2019-11-25 RX ADMIN — ENOXAPARIN SODIUM 40 MILLIGRAM(S): 100 INJECTION SUBCUTANEOUS at 13:41

## 2019-11-25 RX ADMIN — MORPHINE SULFATE 2 MILLIGRAM(S): 50 CAPSULE, EXTENDED RELEASE ORAL at 19:07

## 2019-11-25 RX ADMIN — SODIUM CHLORIDE 150 MILLILITER(S): 9 INJECTION, SOLUTION INTRAVENOUS at 18:53

## 2019-11-25 RX ADMIN — ONDANSETRON 4 MILLIGRAM(S): 8 TABLET, FILM COATED ORAL at 22:58

## 2019-11-25 RX ADMIN — MORPHINE SULFATE 2 MILLIGRAM(S): 50 CAPSULE, EXTENDED RELEASE ORAL at 13:37

## 2019-11-25 RX ADMIN — Medication 3 MILLIGRAM(S): at 03:10

## 2019-11-25 RX ADMIN — PANTOPRAZOLE SODIUM 40 MILLIGRAM(S): 20 TABLET, DELAYED RELEASE ORAL at 13:41

## 2019-11-25 RX ADMIN — MORPHINE SULFATE 2 MILLIGRAM(S): 50 CAPSULE, EXTENDED RELEASE ORAL at 19:23

## 2019-11-25 RX ADMIN — SODIUM CHLORIDE 150 MILLILITER(S): 9 INJECTION, SOLUTION INTRAVENOUS at 02:37

## 2019-11-25 RX ADMIN — POLYETHYLENE GLYCOL 3350 17 GRAM(S): 17 POWDER, FOR SOLUTION ORAL at 18:53

## 2019-11-25 RX ADMIN — MORPHINE SULFATE 2 MILLIGRAM(S): 50 CAPSULE, EXTENDED RELEASE ORAL at 14:04

## 2019-11-25 NOTE — PROGRESS NOTE ADULT - PROBLEM SELECTOR PLAN 7
Transitions of Care Status:  1.  Name of PCP: Dr. Couch  2.  PCP Contacted on Admission: [ ] Y    [X] N- admitted overnight  3.  PCP contacted at Discharge: [ ] Y    [ ] N    [ ] N/A  4.  Post-Discharge Appointment Date and Location:  5.  Summary of Handoff given to PCP:  Dispo: pending clinical improvement

## 2019-11-25 NOTE — PROGRESS NOTE ADULT - PROBLEM SELECTOR PLAN 1
unclear Etiology (no etoh use, no hx of gallstones s/p cholecystectomy), possible Idiopathic Pancreatitis. s/p 2l NS & 1L IVF LR @ 150 cc/hr. morphine 4mg x2 & toradol 15mg x2. sxs improved  -pain control with IV Morphine and IV Benadryl PRN for now  -IV Zofran PRN  -IV Protonix,   -NPO until pain resolves. advance diet as tolerated  -f/u RUQ u/s  -check IgG levels  -triglyceride levels WNL

## 2019-11-25 NOTE — PROGRESS NOTE ADULT - SUBJECTIVE AND OBJECTIVE BOX
Dieter Rodriguez MD PGY-1  [facility] [phone] [place] [id number]      DANY HENDERSON  59y  Female      Patient is a 59y old  Female who presents with a chief complaint of Acute Pancreatitis, Epigastric pain, + N/V, (24 Nov 2019 13:40)      INTERVAL HPI/OVERNIGHT EVENTS:      REVIEW OF SYSTEMS:  CONSTITUTIONAL: No fever, weight loss, or fatigue  EYES: No eye pain, visual disturbances, or discharge  ENMT:  No difficulty hearing, tinnitus, vertigo; No sinus or throat pain  NECK: No pain or stiffness  BREASTS: No pain, masses, or nipple discharge  RESPIRATORY: No cough, wheezing, chills or hemoptysis; No shortness of breath  CARDIOVASCULAR: No chest pain, palpitations, dizziness, or leg swelling  GASTROINTESTINAL: No abdominal or epigastric pain. No nausea, vomiting, or hematemesis; No diarrhea or constipation. No melena or hematochezia.  GENITOURINARY: No dysuria, frequency, hematuria, or incontinence  NEUROLOGICAL: No headaches, memory loss, loss of strength, numbness, or tremors  SKIN: No itching, burning, rashes, or lesions   LYMPH NODES: No enlarged glands  ENDOCRINE: No heat or cold intolerance; No hair loss  MUSCULOSKELETAL: No joint pain or swelling; No muscle, back, or extremity pain  PSYCHIATRIC: No depression, anxiety, mood swings, or difficulty sleeping  HEME/LYMPH: No easy bruising, or bleeding gums  ALLERY AND IMMUNOLOGIC: No hives or eczema  FAMILY HISTORY:  Family history of coronary artery disease (Sibling)    T(C): 36.9 (11-25-19 @ 06:12), Max: 38.1 (11-24-19 @ 21:48)  HR: 95 (11-25-19 @ 06:12) (95 - 110)  BP: 126/63 (11-25-19 @ 06:12) (113/73 - 126/65)  RR: 17 (11-25-19 @ 06:12) (16 - 17)  SpO2: 95% (11-25-19 @ 06:12) (95% - 98%)  Wt(kg): --Vital Signs Last 24 Hrs  T(C): 36.9 (25 Nov 2019 06:12), Max: 38.1 (24 Nov 2019 21:48)  T(F): 98.5 (25 Nov 2019 06:12), Max: 100.5 (24 Nov 2019 21:48)  HR: 95 (25 Nov 2019 06:12) (95 - 110)  BP: 126/63 (25 Nov 2019 06:12) (113/73 - 126/65)  BP(mean): --  RR: 17 (25 Nov 2019 06:12) (16 - 17)  SpO2: 95% (25 Nov 2019 06:12) (95% - 98%)  codeine (Unknown)  morphine (Other)  oxycodone (Unknown)  pcn,morphine,dilaudid (Anaphylaxis; Rash)  tramadol (Unknown)      PHYSICAL EXAM:  GENERAL: NAD, well-groomed, well-developed  HEAD:  Atraumatic, Normocephalic  EYES: EOMI, PERRLA, conjunctiva and sclera clear  ENMT: No tonsillar erythema, exudates, or enlargement; Moist mucous membranes, Good dentition, No lesions  NECK: Supple, No JVD, Normal thyroid  NERVOUS SYSTEM:  Alert & Oriented X3, Good concentration; Motor Strength 5/5 B/L upper and lower extremities; DTRs 2+ intact and symmetric  CHEST/LUNG: Clear to percussion bilaterally; No rales, rhonchi, wheezing, or rubs  HEART: Regular rate and rhythm; No murmurs, rubs, or gallops  ABDOMEN: Soft, Nontender, Nondistended; Bowel sounds present  EXTREMITIES:  2+ Peripheral Pulses, No clubbing, cyanosis, or edema  LYMPH: No lymphadenopathy noted  SKIN: No rashes or lesions    Consultant(s) Notes Reviewed:  [x ] YES  [ ] NO  Care Discussed with Consultants/Other Providers [ x] YES  [ ] NO    LABS:      RADIOLOGY & ADDITIONAL TESTS:    Imaging Personally Reviewed:  [ ] YES  [ ] NO  acetaminophen   Tablet .. 650 milliGRAM(s) Oral every 6 hours PRN  ALPRAZolam 0.25 milliGRAM(s) Oral three times a day PRN  amLODIPine   Tablet 10 milliGRAM(s) Oral daily  diphenhydrAMINE   Injectable 25 milliGRAM(s) IV Push every 4 hours PRN  enoxaparin Injectable 40 milliGRAM(s) SubCutaneous daily  lactated ringers. 1000 milliLiter(s) IV Continuous <Continuous>  levothyroxine 175 MICROGram(s) Oral daily  morphine  - Injectable 2 milliGRAM(s) IV Push every 4 hours PRN  ondansetron Injectable 4 milliGRAM(s) IV Push every 8 hours PRN  pantoprazole  Injectable 40 milliGRAM(s) IV Push daily  senna 2 Tablet(s) Oral at bedtime  simvastatin 20 milliGRAM(s) Oral at bedtime      HEALTH ISSUES - PROBLEM Dx:  Discharge planning issues: Discharge planning issues  Preventive measure: Preventive measure  H/O: hypothyroidism: H/O: hypothyroidism  Anxiety: Anxiety  Hyperlipidemia: Hyperlipidemia  Hypertension: Hypertension  Acute pancreatitis: Acute pancreatitis Dieter Rodriguez MD PGY-1  [facility] [phone] [place] [id number]      DANY HENDERSON  59y  Female      Patient is a 59y old  Female who presents with a chief complaint of Acute Pancreatitis, Epigastric pain, + N/V, (24 Nov 2019 13:40)      INTERVAL HPI/OVERNIGHT EVENTS: No acute overnight events. patient reports that pain has improved to tolerable level 5/10.       REVIEW OF SYSTEMS:  CONSTITUTIONAL: No fever, weight loss, or fatigue  EYES: No eye pain, visual disturbances, or discharge  ENMT:  No difficulty hearing, tinnitus, vertigo; No sinus or throat pain  NECK: No pain or stiffness  BREASTS: No pain, masses, or nipple discharge  RESPIRATORY: No cough, wheezing, chills or hemoptysis; No shortness of breath  CARDIOVASCULAR: No chest pain, palpitations, dizziness, or leg swelling  GASTROINTESTINAL: No abdominal or epigastric pain. No nausea, vomiting, or hematemesis; No diarrhea or constipation. No melena or hematochezia.  GENITOURINARY: No dysuria, frequency, hematuria, or incontinence  NEUROLOGICAL: No headaches, memory loss, loss of strength, numbness, or tremors  SKIN: No itching, burning, rashes, or lesions   LYMPH NODES: No enlarged glands  ENDOCRINE: No heat or cold intolerance; No hair loss  MUSCULOSKELETAL: No joint pain or swelling; No muscle, back, or extremity pain  PSYCHIATRIC: No depression, anxiety, mood swings, or difficulty sleeping  HEME/LYMPH: No easy bruising, or bleeding gums  ALLERY AND IMMUNOLOGIC: No hives or eczema  FAMILY HISTORY:  Family history of coronary artery disease (Sibling)    T(C): 36.9 (11-25-19 @ 06:12), Max: 38.1 (11-24-19 @ 21:48)  HR: 95 (11-25-19 @ 06:12) (95 - 110)  BP: 126/63 (11-25-19 @ 06:12) (113/73 - 126/65)  RR: 17 (11-25-19 @ 06:12) (16 - 17)  SpO2: 95% (11-25-19 @ 06:12) (95% - 98%)  Wt(kg): --Vital Signs Last 24 Hrs  T(C): 36.9 (25 Nov 2019 06:12), Max: 38.1 (24 Nov 2019 21:48)  T(F): 98.5 (25 Nov 2019 06:12), Max: 100.5 (24 Nov 2019 21:48)  HR: 95 (25 Nov 2019 06:12) (95 - 110)  BP: 126/63 (25 Nov 2019 06:12) (113/73 - 126/65)  BP(mean): --  RR: 17 (25 Nov 2019 06:12) (16 - 17)  SpO2: 95% (25 Nov 2019 06:12) (95% - 98%)  codeine (Unknown)  morphine (Other)  oxycodone (Unknown)  pcn,morphine,dilaudid (Anaphylaxis; Rash)  tramadol (Unknown)      PHYSICAL EXAM:  GENERAL: NAD, well-groomed, well-developed  HEAD:  Atraumatic, Normocephalic  EYES: EOMI, PERRLA, conjunctiva and sclera clear  ENMT: No tonsillar erythema, exudates, or enlargement; Moist mucous membranes, Good dentition, No lesions  NECK: Supple, No JVD  NERVOUS SYSTEM:  Alert & Oriented X3, Good concentration; Motor Strength 5/5 B/L upper and lower extremities;   CHEST/LUNG: Clear to percussion bilaterally; No rales, rhonchi, wheezing, or rubs  HEART: Regular rate and rhythm; No murmurs, rubs, or gallops  ABDOMEN: Soft, +TTP epigastrium, Nondistended; Bowel sounds present  EXTREMITIES:  2+ Peripheral Pulses, No clubbing, cyanosis, or edema  LYMPH: No lymphadenopathy noted  SKIN: No rashes or lesions    Consultant(s) Notes Reviewed:  [x ] YES  [ ] NO  Care Discussed with Consultants/Other Providers [ x] YES  [ ] NO    LABS:      RADIOLOGY & ADDITIONAL TESTS:    Imaging Personally Reviewed:  [ ] YES  [ ] NO  acetaminophen   Tablet .. 650 milliGRAM(s) Oral every 6 hours PRN  ALPRAZolam 0.25 milliGRAM(s) Oral three times a day PRN  amLODIPine   Tablet 10 milliGRAM(s) Oral daily  diphenhydrAMINE   Injectable 25 milliGRAM(s) IV Push every 4 hours PRN  enoxaparin Injectable 40 milliGRAM(s) SubCutaneous daily  lactated ringers. 1000 milliLiter(s) IV Continuous <Continuous>  levothyroxine 175 MICROGram(s) Oral daily  morphine  - Injectable 2 milliGRAM(s) IV Push every 4 hours PRN  ondansetron Injectable 4 milliGRAM(s) IV Push every 8 hours PRN  pantoprazole  Injectable 40 milliGRAM(s) IV Push daily  senna 2 Tablet(s) Oral at bedtime  simvastatin 20 milliGRAM(s) Oral at bedtime      HEALTH ISSUES - PROBLEM Dx:  Discharge planning issues: Discharge planning issues  Preventive measure: Preventive measure  H/O: hypothyroidism: H/O: hypothyroidism  Anxiety: Anxiety  Hyperlipidemia: Hyperlipidemia  Hypertension: Hypertension  Acute pancreatitis: Acute pancreatitis

## 2019-11-26 DIAGNOSIS — R06.2 WHEEZING: ICD-10-CM

## 2019-11-26 DIAGNOSIS — K59.00 CONSTIPATION, UNSPECIFIED: ICD-10-CM

## 2019-11-26 LAB
ALBUMIN SERPL ELPH-MCNC: 3 G/DL — LOW (ref 3.3–5)
ALP SERPL-CCNC: 81 U/L — SIGNIFICANT CHANGE UP (ref 40–120)
ALT FLD-CCNC: 45 U/L — HIGH (ref 4–33)
ANION GAP SERPL CALC-SCNC: 13 MMO/L — SIGNIFICANT CHANGE UP (ref 7–14)
APPEARANCE UR: CLEAR — SIGNIFICANT CHANGE UP
AST SERPL-CCNC: 25 U/L — SIGNIFICANT CHANGE UP (ref 4–32)
BACTERIA # UR AUTO: SIGNIFICANT CHANGE UP
BASOPHILS # BLD AUTO: 0.03 K/UL — SIGNIFICANT CHANGE UP (ref 0–0.2)
BASOPHILS NFR BLD AUTO: 0.2 % — SIGNIFICANT CHANGE UP (ref 0–2)
BILIRUB SERPL-MCNC: 0.4 MG/DL — SIGNIFICANT CHANGE UP (ref 0.2–1.2)
BILIRUB UR-MCNC: SIGNIFICANT CHANGE UP
BLOOD UR QL VISUAL: SIGNIFICANT CHANGE UP
BUN SERPL-MCNC: 10 MG/DL — SIGNIFICANT CHANGE UP (ref 7–23)
CALCIUM SERPL-MCNC: 8.9 MG/DL — SIGNIFICANT CHANGE UP (ref 8.4–10.5)
CHLORIDE SERPL-SCNC: 107 MMOL/L — SIGNIFICANT CHANGE UP (ref 98–107)
CO2 SERPL-SCNC: 21 MMOL/L — LOW (ref 22–31)
COLOR SPEC: YELLOW — SIGNIFICANT CHANGE UP
CREAT SERPL-MCNC: 0.7 MG/DL — SIGNIFICANT CHANGE UP (ref 0.5–1.3)
EOSINOPHIL # BLD AUTO: 0.02 K/UL — SIGNIFICANT CHANGE UP (ref 0–0.5)
EOSINOPHIL NFR BLD AUTO: 0.1 % — SIGNIFICANT CHANGE UP (ref 0–6)
GLUCOSE SERPL-MCNC: 111 MG/DL — HIGH (ref 70–99)
GLUCOSE UR-MCNC: NEGATIVE — SIGNIFICANT CHANGE UP
HCT VFR BLD CALC: 40.9 % — SIGNIFICANT CHANGE UP (ref 34.5–45)
HCT VFR BLD CALC: 40.9 % — SIGNIFICANT CHANGE UP (ref 34.5–45)
HGB BLD-MCNC: 13.4 G/DL — SIGNIFICANT CHANGE UP (ref 11.5–15.5)
HGB BLD-MCNC: 13.4 G/DL — SIGNIFICANT CHANGE UP (ref 11.5–15.5)
HYALINE CASTS # UR AUTO: NEGATIVE — SIGNIFICANT CHANGE UP
IMM GRANULOCYTES NFR BLD AUTO: 0.6 % — SIGNIFICANT CHANGE UP (ref 0–1.5)
KETONES UR-MCNC: >150 — HIGH
LEUKOCYTE ESTERASE UR-ACNC: NEGATIVE — SIGNIFICANT CHANGE UP
LYMPHOCYTES # BLD AUTO: 0.59 K/UL — LOW (ref 1–3.3)
LYMPHOCYTES # BLD AUTO: 3.4 % — LOW (ref 13–44)
MAGNESIUM SERPL-MCNC: 1.8 MG/DL — SIGNIFICANT CHANGE UP (ref 1.6–2.6)
MCHC RBC-ENTMCNC: 28.5 PG — SIGNIFICANT CHANGE UP (ref 27–34)
MCHC RBC-ENTMCNC: 28.5 PG — SIGNIFICANT CHANGE UP (ref 27–34)
MCHC RBC-ENTMCNC: 32.8 % — SIGNIFICANT CHANGE UP (ref 32–36)
MCHC RBC-ENTMCNC: 32.8 % — SIGNIFICANT CHANGE UP (ref 32–36)
MCV RBC AUTO: 86.8 FL — SIGNIFICANT CHANGE UP (ref 80–100)
MCV RBC AUTO: 86.8 FL — SIGNIFICANT CHANGE UP (ref 80–100)
MONOCYTES # BLD AUTO: 1.29 K/UL — HIGH (ref 0–0.9)
MONOCYTES NFR BLD AUTO: 7.5 % — SIGNIFICANT CHANGE UP (ref 2–14)
NEUTROPHILS # BLD AUTO: 15.28 K/UL — HIGH (ref 1.8–7.4)
NEUTROPHILS NFR BLD AUTO: 88.2 % — HIGH (ref 43–77)
NITRITE UR-MCNC: NEGATIVE — SIGNIFICANT CHANGE UP
NRBC # FLD: 0 K/UL — SIGNIFICANT CHANGE UP (ref 0–0)
NRBC # FLD: 0 K/UL — SIGNIFICANT CHANGE UP (ref 0–0)
PH UR: 6 — SIGNIFICANT CHANGE UP (ref 5–8)
PHOSPHATE SERPL-MCNC: 2 MG/DL — LOW (ref 2.5–4.5)
PLATELET # BLD AUTO: 245 K/UL — SIGNIFICANT CHANGE UP (ref 150–400)
PLATELET # BLD AUTO: 245 K/UL — SIGNIFICANT CHANGE UP (ref 150–400)
PMV BLD: 10 FL — SIGNIFICANT CHANGE UP (ref 7–13)
PMV BLD: 10 FL — SIGNIFICANT CHANGE UP (ref 7–13)
POTASSIUM SERPL-MCNC: 3.6 MMOL/L — SIGNIFICANT CHANGE UP (ref 3.5–5.3)
POTASSIUM SERPL-SCNC: 3.6 MMOL/L — SIGNIFICANT CHANGE UP (ref 3.5–5.3)
PROT SERPL-MCNC: 6 G/DL — SIGNIFICANT CHANGE UP (ref 6–8.3)
PROT UR-MCNC: 300 — HIGH
RBC # BLD: 4.71 M/UL — SIGNIFICANT CHANGE UP (ref 3.8–5.2)
RBC # BLD: 4.71 M/UL — SIGNIFICANT CHANGE UP (ref 3.8–5.2)
RBC # FLD: 13.8 % — SIGNIFICANT CHANGE UP (ref 10.3–14.5)
RBC # FLD: 13.8 % — SIGNIFICANT CHANGE UP (ref 10.3–14.5)
RBC CASTS # UR COMP ASSIST: SIGNIFICANT CHANGE UP (ref 0–?)
SODIUM SERPL-SCNC: 141 MMOL/L — SIGNIFICANT CHANGE UP (ref 135–145)
SP GR SPEC: 1.03 — SIGNIFICANT CHANGE UP (ref 1–1.04)
SQUAMOUS # UR AUTO: SIGNIFICANT CHANGE UP
UROBILINOGEN FLD QL: SIGNIFICANT CHANGE UP
WBC # BLD: 17.7 K/UL — HIGH (ref 3.8–10.5)
WBC # BLD: 17.7 K/UL — HIGH (ref 3.8–10.5)
WBC # FLD AUTO: 17.7 K/UL — HIGH (ref 3.8–10.5)
WBC # FLD AUTO: 17.7 K/UL — HIGH (ref 3.8–10.5)
WBC UR QL: SIGNIFICANT CHANGE UP (ref 0–?)

## 2019-11-26 PROCEDURE — 99232 SBSQ HOSP IP/OBS MODERATE 35: CPT | Mod: GC

## 2019-11-26 PROCEDURE — 93010 ELECTROCARDIOGRAM REPORT: CPT

## 2019-11-26 RX ORDER — FUROSEMIDE 40 MG
20 TABLET ORAL ONCE
Refills: 0 | Status: COMPLETED | OUTPATIENT
Start: 2019-11-26 | End: 2019-11-26

## 2019-11-26 RX ORDER — POLYETHYLENE GLYCOL 3350 17 G/17G
17 POWDER, FOR SOLUTION ORAL
Refills: 0 | Status: DISCONTINUED | OUTPATIENT
Start: 2019-11-26 | End: 2019-12-01

## 2019-11-26 RX ORDER — SODIUM CHLORIDE 9 MG/ML
1000 INJECTION, SOLUTION INTRAVENOUS
Refills: 0 | Status: DISCONTINUED | OUTPATIENT
Start: 2019-11-26 | End: 2019-11-26

## 2019-11-26 RX ORDER — SODIUM,POTASSIUM PHOSPHATES 278-250MG
1 POWDER IN PACKET (EA) ORAL
Refills: 0 | Status: COMPLETED | OUTPATIENT
Start: 2019-11-26 | End: 2019-11-26

## 2019-11-26 RX ORDER — IPRATROPIUM/ALBUTEROL SULFATE 18-103MCG
3 AEROSOL WITH ADAPTER (GRAM) INHALATION ONCE
Refills: 0 | Status: COMPLETED | OUTPATIENT
Start: 2019-11-26 | End: 2019-11-26

## 2019-11-26 RX ORDER — IPRATROPIUM/ALBUTEROL SULFATE 18-103MCG
3 AEROSOL WITH ADAPTER (GRAM) INHALATION THREE TIMES A DAY
Refills: 0 | Status: DISCONTINUED | OUTPATIENT
Start: 2019-11-26 | End: 2019-12-01

## 2019-11-26 RX ORDER — SODIUM CHLORIDE 9 MG/ML
500 INJECTION, SOLUTION INTRAVENOUS
Refills: 0 | Status: DISCONTINUED | OUTPATIENT
Start: 2019-11-26 | End: 2019-11-26

## 2019-11-26 RX ADMIN — Medication 3 MILLILITER(S): at 04:19

## 2019-11-26 RX ADMIN — Medication 25 MILLIGRAM(S): at 00:14

## 2019-11-26 RX ADMIN — SODIUM CHLORIDE 150 MILLILITER(S): 9 INJECTION, SOLUTION INTRAVENOUS at 02:04

## 2019-11-26 RX ADMIN — AMLODIPINE BESYLATE 10 MILLIGRAM(S): 2.5 TABLET ORAL at 07:11

## 2019-11-26 RX ADMIN — MORPHINE SULFATE 2 MILLIGRAM(S): 50 CAPSULE, EXTENDED RELEASE ORAL at 22:31

## 2019-11-26 RX ADMIN — PANTOPRAZOLE SODIUM 40 MILLIGRAM(S): 20 TABLET, DELAYED RELEASE ORAL at 12:27

## 2019-11-26 RX ADMIN — Medication 1 TABLET(S): at 09:28

## 2019-11-26 RX ADMIN — Medication 3 MILLILITER(S): at 13:34

## 2019-11-26 RX ADMIN — MORPHINE SULFATE 2 MILLIGRAM(S): 50 CAPSULE, EXTENDED RELEASE ORAL at 10:04

## 2019-11-26 RX ADMIN — ENOXAPARIN SODIUM 40 MILLIGRAM(S): 100 INJECTION SUBCUTANEOUS at 12:27

## 2019-11-26 RX ADMIN — Medication 3 MILLILITER(S): at 21:03

## 2019-11-26 RX ADMIN — MORPHINE SULFATE 2 MILLIGRAM(S): 50 CAPSULE, EXTENDED RELEASE ORAL at 10:19

## 2019-11-26 RX ADMIN — MORPHINE SULFATE 2 MILLIGRAM(S): 50 CAPSULE, EXTENDED RELEASE ORAL at 00:15

## 2019-11-26 RX ADMIN — Medication 25 MILLIGRAM(S): at 22:15

## 2019-11-26 RX ADMIN — Medication 175 MICROGRAM(S): at 07:11

## 2019-11-26 RX ADMIN — ONDANSETRON 4 MILLIGRAM(S): 8 TABLET, FILM COATED ORAL at 10:03

## 2019-11-26 RX ADMIN — Medication 1 TABLET(S): at 17:50

## 2019-11-26 RX ADMIN — POLYETHYLENE GLYCOL 3350 17 GRAM(S): 17 POWDER, FOR SOLUTION ORAL at 17:50

## 2019-11-26 RX ADMIN — MORPHINE SULFATE 2 MILLIGRAM(S): 50 CAPSULE, EXTENDED RELEASE ORAL at 00:30

## 2019-11-26 RX ADMIN — SENNA PLUS 2 TABLET(S): 8.6 TABLET ORAL at 22:15

## 2019-11-26 RX ADMIN — MORPHINE SULFATE 2 MILLIGRAM(S): 50 CAPSULE, EXTENDED RELEASE ORAL at 22:15

## 2019-11-26 RX ADMIN — SODIUM CHLORIDE 100 MILLILITER(S): 9 INJECTION, SOLUTION INTRAVENOUS at 09:31

## 2019-11-26 RX ADMIN — Medication 20 MILLIGRAM(S): at 23:27

## 2019-11-26 RX ADMIN — Medication 25 MILLIGRAM(S): at 10:03

## 2019-11-26 RX ADMIN — ONDANSETRON 4 MILLIGRAM(S): 8 TABLET, FILM COATED ORAL at 19:59

## 2019-11-26 RX ADMIN — SIMVASTATIN 20 MILLIGRAM(S): 20 TABLET, FILM COATED ORAL at 22:15

## 2019-11-26 NOTE — PROGRESS NOTE ADULT - PROBLEM SELECTOR PLAN 7
Transitions of Care Status:  1.  Name of PCP: Dr. Couch  2.  PCP Contacted on Admission: [ ] Y    [X] N- admitted overnight  3.  PCP contacted at Discharge: [ ] Y    [ ] N    [ ] N/A  4.  Post-Discharge Appointment Date and Location:  5.  Summary of Handoff given to PCP:  Dispo: pending clinical improvement -c/w Synthroid  -TSH WNL

## 2019-11-26 NOTE — PROGRESS NOTE ADULT - ASSESSMENT
60 y/o Female with hx of pancreatitis (denies ETOH), cholecystectomy, HTN, HLD, hypothyroid on synthroid S/P thyroid Surgery for Benign Thyroid nodules , Obesity, Anxiety, presents with acute epigastric pain radiating to back with nausea and vomiting found to have pancreatitis.

## 2019-11-26 NOTE — PROGRESS NOTE ADULT - PROBLEM SELECTOR PLAN 6
DVT Prophylaxis: Lovenox 40 sq Patient c/o wheezing overnight   -responsive to neb tx  -no hx asthma requiring prn breathing tx  -lungs CTABIL on exam this am, will decrease LR to 100 mL/HR, unlikely due to volume overload but possible.

## 2019-11-26 NOTE — PROGRESS NOTE ADULT - PROBLEM SELECTOR PLAN 4
-C/w home dose of xanax 0.25mg PO TID Patient reports no BM since admission, with feeling of abdominal distension  -daily senna  -s/p miralax  -tap water enema  -judicious use of opioid pain medications

## 2019-11-26 NOTE — PROGRESS NOTE ADULT - PROBLEM SELECTOR PLAN 1
unclear Etiology (no etoh use, no hx of gallstones s/p cholecystectomy), possible Idiopathic Pancreatitis. s/p 2l NS & 1L IVF LR @ 150 cc/hr. morphine 4mg x2 & toradol 15mg x2. sxs improved  -pain control with IV Morphine and IV Benadryl PRN for now  -IV Zofran PRN  -IV Protonix,   -NPO until pain resolves. advance diet as tolerated  -f/u RUQ u/s  -check IgG levels  -triglyceride levels WNL unclear Etiology (no etoh use, no hx of gallstones s/p cholecystectomy), possible Idiopathic Pancreatitis. s/p 2l NS & 1L IVF LR @ 150 cc/hr. morphine 4mg x2 & toradol 15mg x2. sxs improved  -pain control with IV Morphine and IV Benadryl PRN for now  -IV Zofran PRN  -IV Protonix,   -advancing diet, limited PO intake   -RUQ demonstrates pancreatic duct dilatation, no obstruction   -f/u IgG levels  -triglyceride levels WNL  -c/w  mL HR

## 2019-11-26 NOTE — PROGRESS NOTE ADULT - SUBJECTIVE AND OBJECTIVE BOX
Dieter Rodriguez MD PGY-1  [facility] [phone] II [facility] [id number]      DANY HENDERSON  59y  Female      Patient is a 59y old  Female who presents with a chief complaint of Acute Pancreatitis, Epigastric pain, + N/V, (25 Nov 2019 06:48)      INTERVAL HPI/OVERNIGHT EVENTS:      REVIEW OF SYSTEMS:  CONSTITUTIONAL: No fever, weight loss, or fatigue  EYES: No eye pain, visual disturbances, or discharge  ENMT:  No difficulty hearing, tinnitus, vertigo; No sinus or throat pain  NECK: No pain or stiffness  BREASTS: No pain, masses, or nipple discharge  RESPIRATORY: No cough, wheezing, chills or hemoptysis; No shortness of breath  CARDIOVASCULAR: No chest pain, palpitations, dizziness, or leg swelling  GASTROINTESTINAL: No abdominal or epigastric pain. No nausea, vomiting, or hematemesis; No diarrhea or constipation. No melena or hematochezia.  GENITOURINARY: No dysuria, frequency, hematuria, or incontinence  NEUROLOGICAL: No headaches, memory loss, loss of strength, numbness, or tremors  SKIN: No itching, burning, rashes, or lesions   LYMPH NODES: No enlarged glands  ENDOCRINE: No heat or cold intolerance; No hair loss  MUSCULOSKELETAL: No joint pain or swelling; No muscle, back, or extremity pain  PSYCHIATRIC: No depression, anxiety, mood swings, or difficulty sleeping  HEME/LYMPH: No easy bruising, or bleeding gums  ALLERY AND IMMUNOLOGIC: No hives or eczema  FAMILY HISTORY:  Family history of coronary artery disease (Sibling)    T(C): 36.9 (11-26-19 @ 00:45), Max: 37.8 (11-25-19 @ 21:13)  HR: 101 (11-26-19 @ 04:20) (101 - 114)  BP: 146/70 (11-26-19 @ 00:45) (129/65 - 146/70)  RR: 18 (11-26-19 @ 00:45) (17 - 20)  SpO2: 94% (11-26-19 @ 04:20) (94% - 97%)  Wt(kg): --Vital Signs Last 24 Hrs  T(C): 36.9 (26 Nov 2019 00:45), Max: 37.8 (25 Nov 2019 21:13)  T(F): 98.4 (26 Nov 2019 00:45), Max: 100 (25 Nov 2019 21:13)  HR: 101 (26 Nov 2019 04:20) (101 - 114)  BP: 146/70 (26 Nov 2019 00:45) (129/65 - 146/70)  BP(mean): --  RR: 18 (26 Nov 2019 00:45) (17 - 20)  SpO2: 94% (26 Nov 2019 04:20) (94% - 97%)  codeine (Unknown)  morphine (Other)  oxycodone (Unknown)  pcn,morphine,dilaudid (Anaphylaxis; Rash)  tramadol (Unknown)      PHYSICAL EXAM:  GENERAL: NAD, well-groomed, well-developed  HEAD:  Atraumatic, Normocephalic  EYES: EOMI, PERRLA, conjunctiva and sclera clear  ENMT: No tonsillar erythema, exudates, or enlargement; Moist mucous membranes, Good dentition, No lesions  NECK: Supple, No JVD, Normal thyroid  NERVOUS SYSTEM:  Alert & Oriented X3, Good concentration; Motor Strength 5/5 B/L upper and lower extremities; DTRs 2+ intact and symmetric  CHEST/LUNG: Clear to percussion bilaterally; No rales, rhonchi, wheezing, or rubs  HEART: Regular rate and rhythm; No murmurs, rubs, or gallops  ABDOMEN: Soft, Nontender, Nondistended; Bowel sounds present  EXTREMITIES:  2+ Peripheral Pulses, No clubbing, cyanosis, or edema  LYMPH: No lymphadenopathy noted  SKIN: No rashes or lesions    Consultant(s) Notes Reviewed:  [x ] YES  [ ] NO  Care Discussed with Consultants/Other Providers [ x] YES  [ ] NO    LABS:      RADIOLOGY & ADDITIONAL TESTS:    Imaging Personally Reviewed:  [ ] YES  [ ] NO  acetaminophen   Tablet .. 650 milliGRAM(s) Oral every 6 hours PRN  ALPRAZolam 0.25 milliGRAM(s) Oral three times a day PRN  amLODIPine   Tablet 10 milliGRAM(s) Oral daily  diphenhydrAMINE   Injectable 25 milliGRAM(s) IV Push every 4 hours PRN  enoxaparin Injectable 40 milliGRAM(s) SubCutaneous daily  lactated ringers. 1000 milliLiter(s) IV Continuous <Continuous>  levothyroxine 175 MICROGram(s) Oral daily  morphine  - Injectable 2 milliGRAM(s) IV Push every 4 hours PRN  ondansetron Injectable 4 milliGRAM(s) IV Push every 8 hours PRN  pantoprazole  Injectable 40 milliGRAM(s) IV Push daily  senna 2 Tablet(s) Oral at bedtime  simvastatin 20 milliGRAM(s) Oral at bedtime      HEALTH ISSUES - PROBLEM Dx:  Discharge planning issues: Discharge planning issues  Preventive measure: Preventive measure  H/O: hypothyroidism: H/O: hypothyroidism  Anxiety: Anxiety  Hyperlipidemia: Hyperlipidemia  Hypertension: Hypertension  Acute pancreatitis: Acute pancreatitis Dieter Rodriguez MD PGY-1  [facility] [phone] [place] [facility] [id number]      DANY HENDERSON  59y  Female      Patient is a 59y old  Female who presents with a chief complaint of Acute Pancreatitis, Epigastric pain, + N/V, (25 Nov 2019 06:48)      INTERVAL HPI/OVERNIGHT EVENTS: Patient still without bowel movement, c/o constipation. Also notes increased      REVIEW OF SYSTEMS:  CONSTITUTIONAL: No fever, weight loss, or fatigue  EYES: No eye pain, visual disturbances, or discharge  ENMT:  No difficulty hearing, tinnitus, vertigo; No sinus or throat pain  NECK: No pain or stiffness  BREASTS: No pain, masses, or nipple discharge  RESPIRATORY: No cough, wheezing, chills or hemoptysis; No shortness of breath  CARDIOVASCULAR: No chest pain, palpitations, dizziness, or leg swelling  GASTROINTESTINAL: No abdominal or epigastric pain. No nausea, vomiting, or hematemesis; No diarrhea or constipation. No melena or hematochezia.  GENITOURINARY: No dysuria, frequency, hematuria, or incontinence  NEUROLOGICAL: No headaches, memory loss, loss of strength, numbness, or tremors  SKIN: No itching, burning, rashes, or lesions   LYMPH NODES: No enlarged glands  ENDOCRINE: No heat or cold intolerance; No hair loss  MUSCULOSKELETAL: No joint pain or swelling; No muscle, back, or extremity pain  PSYCHIATRIC: No depression, anxiety, mood swings, or difficulty sleeping  HEME/LYMPH: No easy bruising, or bleeding gums  ALLERY AND IMMUNOLOGIC: No hives or eczema  FAMILY HISTORY:  Family history of coronary artery disease (Sibling)    T(C): 36.9 (11-26-19 @ 00:45), Max: 37.8 (11-25-19 @ 21:13)  HR: 101 (11-26-19 @ 04:20) (101 - 114)  BP: 146/70 (11-26-19 @ 00:45) (129/65 - 146/70)  RR: 18 (11-26-19 @ 00:45) (17 - 20)  SpO2: 94% (11-26-19 @ 04:20) (94% - 97%)  Wt(kg): --Vital Signs Last 24 Hrs  T(C): 36.9 (26 Nov 2019 00:45), Max: 37.8 (25 Nov 2019 21:13)  T(F): 98.4 (26 Nov 2019 00:45), Max: 100 (25 Nov 2019 21:13)  HR: 101 (26 Nov 2019 04:20) (101 - 114)  BP: 146/70 (26 Nov 2019 00:45) (129/65 - 146/70)  BP(mean): --  RR: 18 (26 Nov 2019 00:45) (17 - 20)  SpO2: 94% (26 Nov 2019 04:20) (94% - 97%)  codeine (Unknown)  morphine (Other)  oxycodone (Unknown)  pcn,morphine,dilaudid (Anaphylaxis; Rash)  tramadol (Unknown)      PHYSICAL EXAM:  GENERAL: NAD, well-groomed, well-developed  HEAD:  Atraumatic, Normocephalic  EYES: EOMI, PERRLA, conjunctiva and sclera clear  ENMT: No tonsillar erythema, exudates, or enlargement; Moist mucous membranes, Good dentition, No lesions  NECK: Supple, No JVD, Normal thyroid  NERVOUS SYSTEM:  Alert & Oriented X3, Good concentration; Motor Strength 5/5 B/L upper and lower extremities; DTRs 2+ intact and symmetric  CHEST/LUNG: Clear to percussion bilaterally; No rales, rhonchi, wheezing, or rubs  HEART: Regular rate and rhythm; No murmurs, rubs, or gallops  ABDOMEN: Soft, Nontender, Nondistended; Bowel sounds present  EXTREMITIES:  2+ Peripheral Pulses, No clubbing, cyanosis, or edema  LYMPH: No lymphadenopathy noted  SKIN: No rashes or lesions    Consultant(s) Notes Reviewed:  [x ] YES  [ ] NO  Care Discussed with Consultants/Other Providers [ x] YES  [ ] NO    LABS:      RADIOLOGY & ADDITIONAL TESTS:    Imaging Personally Reviewed:  [ ] YES  [ ] NO  acetaminophen   Tablet .. 650 milliGRAM(s) Oral every 6 hours PRN  ALPRAZolam 0.25 milliGRAM(s) Oral three times a day PRN  amLODIPine   Tablet 10 milliGRAM(s) Oral daily  diphenhydrAMINE   Injectable 25 milliGRAM(s) IV Push every 4 hours PRN  enoxaparin Injectable 40 milliGRAM(s) SubCutaneous daily  lactated ringers. 1000 milliLiter(s) IV Continuous <Continuous>  levothyroxine 175 MICROGram(s) Oral daily  morphine  - Injectable 2 milliGRAM(s) IV Push every 4 hours PRN  ondansetron Injectable 4 milliGRAM(s) IV Push every 8 hours PRN  pantoprazole  Injectable 40 milliGRAM(s) IV Push daily  senna 2 Tablet(s) Oral at bedtime  simvastatin 20 milliGRAM(s) Oral at bedtime      HEALTH ISSUES - PROBLEM Dx:  Discharge planning issues: Discharge planning issues  Preventive measure: Preventive measure  H/O: hypothyroidism: H/O: hypothyroidism  Anxiety: Anxiety  Hyperlipidemia: Hyperlipidemia  Hypertension: Hypertension  Acute pancreatitis: Acute pancreatitis Dieter Rodriguez MD PGY-1  [facility] [phone] [place] [facility] [id number]      DANY HENDERSON  59y  Female      Patient is a 59y old  Female who presents with a chief complaint of Acute Pancreatitis, Epigastric pain, + N/V, (25 Nov 2019 06:48)      INTERVAL HPI/OVERNIGHT EVENTS: Patient still without bowel movement, c/o constipation. Also notes increased pain at posterior mid spine region radiating to L flank.       REVIEW OF SYSTEMS:  CONSTITUTIONAL: +pain abdomen, +cramps, +constipation  EYES: No eye pain, visual disturbances, or discharge  ENMT:  No difficulty hearing, tinnitus, vertigo; No sinus or throat pain  NECK: No pain or stiffness  BREASTS: No pain, masses, or nipple discharge  RESPIRATORY: +mild wheezing  CARDIOVASCULAR: No chest pain, palpitations, dizziness, or leg swelling  GASTROINTESTINAL: +abdominal pain   GENITOURINARY: No dysuria, frequency, hematuria, or incontinence  NEUROLOGICAL: No headaches, memory loss, loss of strength, numbness, or tremors  SKIN: No itching, burning, rashes, or lesions   LYMPH NODES: No enlarged glands  ENDOCRINE: No heat or cold intolerance; No hair loss  MUSCULOSKELETAL: No joint pain or swelling; No muscle, back, or extremity pain  PSYCHIATRIC: No depression, anxiety, mood swings, or difficulty sleeping  HEME/LYMPH: No easy bruising, or bleeding gums  ALLERY AND IMMUNOLOGIC: No hives or eczema  FAMILY HISTORY:  Family history of coronary artery disease (Sibling)    T(C): 36.9 (11-26-19 @ 00:45), Max: 37.8 (11-25-19 @ 21:13)  HR: 101 (11-26-19 @ 04:20) (101 - 114)  BP: 146/70 (11-26-19 @ 00:45) (129/65 - 146/70)  RR: 18 (11-26-19 @ 00:45) (17 - 20)  SpO2: 94% (11-26-19 @ 04:20) (94% - 97%)  Wt(kg): --Vital Signs Last 24 Hrs  T(C): 36.9 (26 Nov 2019 00:45), Max: 37.8 (25 Nov 2019 21:13)  T(F): 98.4 (26 Nov 2019 00:45), Max: 100 (25 Nov 2019 21:13)  HR: 101 (26 Nov 2019 04:20) (101 - 114)  BP: 146/70 (26 Nov 2019 00:45) (129/65 - 146/70)  BP(mean): --  RR: 18 (26 Nov 2019 00:45) (17 - 20)  SpO2: 94% (26 Nov 2019 04:20) (94% - 97%)  codeine (Unknown)  morphine (Other)  oxycodone (Unknown)  pcn,morphine,dilaudid (Anaphylaxis; Rash)  tramadol (Unknown)      PHYSICAL EXAM:  GENERAL: NAD, well-groomed, well-developed  HEAD:  Atraumatic, Normocephalic  EYES: EOMI, PERRLA, conjunctiva and sclera clear  ENMT: No tonsillar erythema, exudates, or enlargement; Moist mucous membranes, Good dentition, No lesions  NECK: Supple, No JVD, Normal thyroid  NERVOUS SYSTEM:  Alert & Oriented X3, Good concentration; Motor Strength 5/5 B/L upper and lower extremities; DTRs 2+ intact and symmetric  CHEST/LUNG: Clear to percussion bilaterally; No wheezes appreciated   HEART: Regular rate and rhythm; No murmurs, rubs, or gallops  ABDOMEN: Soft, +TTP epigastrium, posterior CVA tenderness L side   EXTREMITIES:  2+ Peripheral Pulses, No clubbing, cyanosis, or edema  LYMPH: No lymphadenopathy noted  SKIN: No rashes or lesions    Consultant(s) Notes Reviewed:  [x ] YES  [ ] NO  Care Discussed with Consultants/Other Providers [ x] YES  [ ] NO    LABS:      RADIOLOGY & ADDITIONAL TESTS:    Imaging Personally Reviewed:  [ ] YES  [ ] NO  acetaminophen   Tablet .. 650 milliGRAM(s) Oral every 6 hours PRN  ALPRAZolam 0.25 milliGRAM(s) Oral three times a day PRN  amLODIPine   Tablet 10 milliGRAM(s) Oral daily  diphenhydrAMINE   Injectable 25 milliGRAM(s) IV Push every 4 hours PRN  enoxaparin Injectable 40 milliGRAM(s) SubCutaneous daily  lactated ringers. 1000 milliLiter(s) IV Continuous <Continuous>  levothyroxine 175 MICROGram(s) Oral daily  morphine  - Injectable 2 milliGRAM(s) IV Push every 4 hours PRN  ondansetron Injectable 4 milliGRAM(s) IV Push every 8 hours PRN  pantoprazole  Injectable 40 milliGRAM(s) IV Push daily  senna 2 Tablet(s) Oral at bedtime  simvastatin 20 milliGRAM(s) Oral at bedtime      HEALTH ISSUES - PROBLEM Dx:  Discharge planning issues: Discharge planning issues  Preventive measure: Preventive measure  H/O: hypothyroidism: H/O: hypothyroidism  Anxiety: Anxiety  Hyperlipidemia: Hyperlipidemia  Hypertension: Hypertension  Acute pancreatitis: Acute pancreatitis Dieter Rodriguez MD PGY-1  [facility] [phone] [place] [facility] [id number]      DANY HENDERSON  59y  Female      Patient is a 59y old  Female who presents with a chief complaint of Acute Pancreatitis, Epigastric pain, + N/V, (25 Nov 2019 06:48)      INTERVAL HPI/OVERNIGHT EVENTS: Patient still without bowel movement, c/o constipation. Also notes increased pain at posterior mid spine region radiating to L flank.       REVIEW OF SYSTEMS:  CONSTITUTIONAL: +pain abdomen, +cramps, +constipation  EYES: No eye pain, visual disturbances, or discharge  ENMT:  No difficulty hearing, tinnitus, vertigo; No sinus or throat pain  NECK: No pain or stiffness  BREASTS: No pain, masses, or nipple discharge  RESPIRATORY: +mild wheezing  CARDIOVASCULAR: No chest pain, palpitations, dizziness, or leg swelling  GASTROINTESTINAL: +abdominal pain   GENITOURINARY: No dysuria, frequency, hematuria, or incontinence  NEUROLOGICAL: No headaches, memory loss, loss of strength, numbness, or tremors  SKIN: No itching, burning, rashes, or lesions   LYMPH NODES: No enlarged glands  ENDOCRINE: No heat or cold intolerance; No hair loss  MUSCULOSKELETAL: No joint pain or swelling; No muscle, back, or extremity pain  PSYCHIATRIC: No depression, anxiety, mood swings, or difficulty sleeping  HEME/LYMPH: No easy bruising, or bleeding gums  ALLERY AND IMMUNOLOGIC: No hives or eczema  FAMILY HISTORY:  Family history of coronary artery disease (Sibling)    T(C): 36.9 (11-26-19 @ 00:45), Max: 37.8 (11-25-19 @ 21:13)  HR: 101 (11-26-19 @ 04:20) (101 - 114)  BP: 146/70 (11-26-19 @ 00:45) (129/65 - 146/70)  RR: 18 (11-26-19 @ 00:45) (17 - 20)  SpO2: 94% (11-26-19 @ 04:20) (94% - 97%)  Wt(kg): --Vital Signs Last 24 Hrs  T(C): 36.9 (26 Nov 2019 00:45), Max: 37.8 (25 Nov 2019 21:13)  T(F): 98.4 (26 Nov 2019 00:45), Max: 100 (25 Nov 2019 21:13)  HR: 101 (26 Nov 2019 04:20) (101 - 114)  BP: 146/70 (26 Nov 2019 00:45) (129/65 - 146/70)  BP(mean): --  RR: 18 (26 Nov 2019 00:45) (17 - 20)  SpO2: 94% (26 Nov 2019 04:20) (94% - 97%)  codeine (Unknown)  morphine (Other)  oxycodone (Unknown)  pcn,morphine,dilaudid (Anaphylaxis; Rash)  tramadol (Unknown)      PHYSICAL EXAM:  GENERAL: NAD, well-groomed, well-developed  HEAD:  Atraumatic, Normocephalic  EYES: EOMI, PERRLA, conjunctiva and sclera clear  ENMT: No tonsillar erythema, exudates, or enlargement; Moist mucous membranes, Good dentition, No lesions  NECK: Supple, No JVD, Normal thyroid  NERVOUS SYSTEM:  Alert & Oriented X3, Good concentration; Motor Strength 5/5 B/L upper and lower extremities; DTRs 2+ intact and symmetric  CHEST/LUNG: Clear to percussion bilaterally; No wheezes appreciated   HEART: Regular rate and rhythm; No murmurs, rubs, or gallops  ABDOMEN: Soft, +TTP epigastrium, posterior CVA tenderness L side   EXTREMITIES:  2+ Peripheral Pulses, trace BL LE edema  LYMPH: No lymphadenopathy noted  SKIN: No rashes or lesions    Consultant(s) Notes Reviewed:  [x ] YES  [ ] NO  Care Discussed with Consultants/Other Providers [ x] YES  [ ] NO    LABS:      RADIOLOGY & ADDITIONAL TESTS:    Imaging Personally Reviewed:  [ ] YES  [ ] NO  acetaminophen   Tablet .. 650 milliGRAM(s) Oral every 6 hours PRN  ALPRAZolam 0.25 milliGRAM(s) Oral three times a day PRN  amLODIPine   Tablet 10 milliGRAM(s) Oral daily  diphenhydrAMINE   Injectable 25 milliGRAM(s) IV Push every 4 hours PRN  enoxaparin Injectable 40 milliGRAM(s) SubCutaneous daily  lactated ringers. 1000 milliLiter(s) IV Continuous <Continuous>  levothyroxine 175 MICROGram(s) Oral daily  morphine  - Injectable 2 milliGRAM(s) IV Push every 4 hours PRN  ondansetron Injectable 4 milliGRAM(s) IV Push every 8 hours PRN  pantoprazole  Injectable 40 milliGRAM(s) IV Push daily  senna 2 Tablet(s) Oral at bedtime  simvastatin 20 milliGRAM(s) Oral at bedtime      HEALTH ISSUES - PROBLEM Dx:  Discharge planning issues: Discharge planning issues  Preventive measure: Preventive measure  H/O: hypothyroidism: H/O: hypothyroidism  Anxiety: Anxiety  Hyperlipidemia: Hyperlipidemia  Hypertension: Hypertension  Acute pancreatitis: Acute pancreatitis

## 2019-11-27 LAB
ALBUMIN SERPL ELPH-MCNC: 3.2 G/DL — LOW (ref 3.3–5)
ALP SERPL-CCNC: 167 U/L — HIGH (ref 40–120)
ALT FLD-CCNC: 153 U/L — HIGH (ref 4–33)
ANION GAP SERPL CALC-SCNC: 15 MMO/L — HIGH (ref 7–14)
AST SERPL-CCNC: 134 U/L — HIGH (ref 4–32)
BILIRUB SERPL-MCNC: 0.5 MG/DL — SIGNIFICANT CHANGE UP (ref 0.2–1.2)
BUN SERPL-MCNC: 8 MG/DL — SIGNIFICANT CHANGE UP (ref 7–23)
CALCIUM SERPL-MCNC: 8.8 MG/DL — SIGNIFICANT CHANGE UP (ref 8.4–10.5)
CHLORIDE SERPL-SCNC: 97 MMOL/L — LOW (ref 98–107)
CO2 SERPL-SCNC: 24 MMOL/L — SIGNIFICANT CHANGE UP (ref 22–31)
CREAT SERPL-MCNC: 0.64 MG/DL — SIGNIFICANT CHANGE UP (ref 0.5–1.3)
GLUCOSE SERPL-MCNC: 125 MG/DL — HIGH (ref 70–99)
HCT VFR BLD CALC: 39.6 % — SIGNIFICANT CHANGE UP (ref 34.5–45)
HGB BLD-MCNC: 13.2 G/DL — SIGNIFICANT CHANGE UP (ref 11.5–15.5)
MAGNESIUM SERPL-MCNC: 2 MG/DL — SIGNIFICANT CHANGE UP (ref 1.6–2.6)
MCHC RBC-ENTMCNC: 28.6 PG — SIGNIFICANT CHANGE UP (ref 27–34)
MCHC RBC-ENTMCNC: 33.3 % — SIGNIFICANT CHANGE UP (ref 32–36)
MCV RBC AUTO: 85.7 FL — SIGNIFICANT CHANGE UP (ref 80–100)
NRBC # FLD: 0 K/UL — SIGNIFICANT CHANGE UP (ref 0–0)
PHOSPHATE SERPL-MCNC: 2.7 MG/DL — SIGNIFICANT CHANGE UP (ref 2.5–4.5)
PLATELET # BLD AUTO: 279 K/UL — SIGNIFICANT CHANGE UP (ref 150–400)
PMV BLD: 10.1 FL — SIGNIFICANT CHANGE UP (ref 7–13)
POTASSIUM SERPL-MCNC: 3.2 MMOL/L — LOW (ref 3.5–5.3)
POTASSIUM SERPL-SCNC: 3.2 MMOL/L — LOW (ref 3.5–5.3)
PROT SERPL-MCNC: 6.6 G/DL — SIGNIFICANT CHANGE UP (ref 6–8.3)
RBC # BLD: 4.62 M/UL — SIGNIFICANT CHANGE UP (ref 3.8–5.2)
RBC # FLD: 13.9 % — SIGNIFICANT CHANGE UP (ref 10.3–14.5)
SODIUM SERPL-SCNC: 136 MMOL/L — SIGNIFICANT CHANGE UP (ref 135–145)
WBC # BLD: 15.08 K/UL — HIGH (ref 3.8–10.5)
WBC # FLD AUTO: 15.08 K/UL — HIGH (ref 3.8–10.5)

## 2019-11-27 PROCEDURE — 99233 SBSQ HOSP IP/OBS HIGH 50: CPT | Mod: GC

## 2019-11-27 PROCEDURE — 71045 X-RAY EXAM CHEST 1 VIEW: CPT | Mod: 26

## 2019-11-27 RX ORDER — POTASSIUM CHLORIDE 20 MEQ
40 PACKET (EA) ORAL ONCE
Refills: 0 | Status: COMPLETED | OUTPATIENT
Start: 2019-11-27 | End: 2019-11-27

## 2019-11-27 RX ORDER — FUROSEMIDE 40 MG
20 TABLET ORAL ONCE
Refills: 0 | Status: COMPLETED | OUTPATIENT
Start: 2019-11-27 | End: 2019-11-27

## 2019-11-27 RX ADMIN — Medication 3 MILLILITER(S): at 11:02

## 2019-11-27 RX ADMIN — MORPHINE SULFATE 2 MILLIGRAM(S): 50 CAPSULE, EXTENDED RELEASE ORAL at 03:56

## 2019-11-27 RX ADMIN — MORPHINE SULFATE 2 MILLIGRAM(S): 50 CAPSULE, EXTENDED RELEASE ORAL at 22:41

## 2019-11-27 RX ADMIN — MORPHINE SULFATE 2 MILLIGRAM(S): 50 CAPSULE, EXTENDED RELEASE ORAL at 16:16

## 2019-11-27 RX ADMIN — Medication 175 MICROGRAM(S): at 06:00

## 2019-11-27 RX ADMIN — POLYETHYLENE GLYCOL 3350 17 GRAM(S): 17 POWDER, FOR SOLUTION ORAL at 18:08

## 2019-11-27 RX ADMIN — Medication 0.25 MILLIGRAM(S): at 06:00

## 2019-11-27 RX ADMIN — Medication 3 MILLILITER(S): at 20:30

## 2019-11-27 RX ADMIN — Medication 20 MILLIGRAM(S): at 12:57

## 2019-11-27 RX ADMIN — Medication 25 MILLIGRAM(S): at 03:41

## 2019-11-27 RX ADMIN — PANTOPRAZOLE SODIUM 40 MILLIGRAM(S): 20 TABLET, DELAYED RELEASE ORAL at 11:39

## 2019-11-27 RX ADMIN — MORPHINE SULFATE 2 MILLIGRAM(S): 50 CAPSULE, EXTENDED RELEASE ORAL at 03:41

## 2019-11-27 RX ADMIN — POLYETHYLENE GLYCOL 3350 17 GRAM(S): 17 POWDER, FOR SOLUTION ORAL at 06:00

## 2019-11-27 RX ADMIN — Medication 25 MILLIGRAM(S): at 22:41

## 2019-11-27 RX ADMIN — Medication 40 MILLIEQUIVALENT(S): at 08:56

## 2019-11-27 RX ADMIN — ENOXAPARIN SODIUM 40 MILLIGRAM(S): 100 INJECTION SUBCUTANEOUS at 11:40

## 2019-11-27 RX ADMIN — AMLODIPINE BESYLATE 10 MILLIGRAM(S): 2.5 TABLET ORAL at 06:00

## 2019-11-27 RX ADMIN — Medication 25 MILLIGRAM(S): at 15:49

## 2019-11-27 RX ADMIN — SIMVASTATIN 20 MILLIGRAM(S): 20 TABLET, FILM COATED ORAL at 22:39

## 2019-11-27 RX ADMIN — MORPHINE SULFATE 2 MILLIGRAM(S): 50 CAPSULE, EXTENDED RELEASE ORAL at 22:56

## 2019-11-27 RX ADMIN — MORPHINE SULFATE 2 MILLIGRAM(S): 50 CAPSULE, EXTENDED RELEASE ORAL at 15:49

## 2019-11-27 RX ADMIN — SENNA PLUS 2 TABLET(S): 8.6 TABLET ORAL at 22:39

## 2019-11-27 RX ADMIN — ONDANSETRON 4 MILLIGRAM(S): 8 TABLET, FILM COATED ORAL at 15:53

## 2019-11-27 NOTE — PROGRESS NOTE ADULT - PROBLEM SELECTOR PLAN 6
Patient c/o intermittent wheezing  -responsive to neb tx, neb prn ordered  -no hx asthma requiring prn breathing tx  -lungs CTABIL on exam this am  -unlikely 2/2 fluid overload, now off fluid will monitor for improvement Patient c/o intermittent wheezing  -responsive to neb tx, neb prn ordered  -no hx asthma requiring prn breathing tx  -s/p 20mg lasix O/N  -f/u CXR, f/u urine output   -lungs CTABIL on exam this am  -consider repeat dose of lasix

## 2019-11-27 NOTE — PROGRESS NOTE ADULT - PROBLEM SELECTOR PLAN 4
Patient reports multiple bowel movements and improvement of constipation   -daily senna  -s/p miralax  -tap water enema  -judicious use of opioid pain medications

## 2019-11-27 NOTE — PROGRESS NOTE ADULT - SUBJECTIVE AND OBJECTIVE BOX
Dieter Rodriguez MD PGY-1  [facility] [phone] [place] [id number]      DANY HENDERSON  59y  Female      Patient is a 59y old  Female who presents with a chief complaint of Acute Pancreatitis, Epigastric pain, + N/V, (26 Nov 2019 06:12)      INTERVAL HPI/OVERNIGHT EVENTS: patient c/o restlessness, xanax given by NF.       REVIEW OF SYSTEMS:  CONSTITUTIONAL: +abd pain   EYES: No eye pain,ENMT:  No difficulty hearing, tinnitus, vertigo; No sinus or throat pain  NECK: No pain or stiffness  RESPIRATORY: +intermittent wheeze   CARDIOVASCULAR: No chest pain, palpitations, dizziness, or leg swelling  GASTROINTESTINAL: +epigastric pain to L flank  GENITOURINARY: No dysuria, frequency, hematuria, or incontinence  NEUROLOGICAL: No headaches, memory loss, loss of strength, numbness, or tremors  SKIN: No itching, burning, rashes, or lesions   LYMPH NODES: No enlarged glands  ENDOCRINE: No heat or cold intolerance; No hair loss  MUSCULOSKELETAL: No joint pain or swelling; No muscle, back, or extremity pain  PSYCHIATRIC: No depression, anxiety, mood swings, or difficulty sleeping  HEME/LYMPH: No easy bruising, or bleeding gums  ALLERY AND IMMUNOLOGIC: No hives or eczema  FAMILY HISTORY:  Family history of coronary artery disease (Sibling)    T(C): 36.9 (11-27-19 @ 05:56), Max: 37.1 (11-26-19 @ 07:07)  HR: 115 (11-27-19 @ 05:56) (98 - 126)  BP: 122/80 (11-27-19 @ 05:56) (122/80 - 150/82)  RR: 20 (11-27-19 @ 05:56) (18 - 22)  SpO2: 96% (11-27-19 @ 05:56) (93% - 100%)  Wt(kg): --Vital Signs Last 24 Hrs  T(C): 36.9 (27 Nov 2019 05:56), Max: 37.1 (26 Nov 2019 07:07)  T(F): 98.5 (27 Nov 2019 05:56), Max: 98.8 (26 Nov 2019 07:07)  HR: 115 (27 Nov 2019 05:56) (98 - 126)  BP: 122/80 (27 Nov 2019 05:56) (122/80 - 150/82)  BP(mean): --  RR: 20 (27 Nov 2019 05:56) (18 - 22)  SpO2: 96% (27 Nov 2019 05:56) (93% - 100%)  codeine (Unknown)  morphine (Other)  oxycodone (Unknown)  pcn,morphine,dilaudid (Anaphylaxis; Rash)  tramadol (Unknown)      PHYSICAL EXAM:  GENERAL: NAD, well-groomed, well-developed  HEAD:  Atraumatic, Normocephalic  EYES: EOMI, PERRLA, conjunctiva and sclera clear  ENMT: No tonsillar erythema, exudates, or enlargement; Moist mucous membranes, Good dentition, No lesions  NECK: Supple, No JVD, Normal thyroid  NERVOUS SYSTEM:  Alert & Oriented X3, Good concentration; Motor Strength 5/5 B/L upper and lower extremities; DTRs 2+ intact and symmetric  CHEST/LUNG: Clear to percussion bilaterally; No rales, rhonchi, wheezing, or rubs  HEART: Regular rate and rhythm; No murmurs, rubs, or gallops  ABDOMEN: +TTP epigastrium  EXTREMITIES:  2+ Peripheral Pulses, trace LE STEPHY edema   LYMPH: No lymphadenopathy noted  SKIN: No rashes or lesions    Consultant(s) Notes Reviewed:  [x ] YES  [ ] NO  Care Discussed with Consultants/Other Providers [ x] YES  [ ] NO    LABS:      RADIOLOGY & ADDITIONAL TESTS:    Imaging Personally Reviewed:  [ ] YES  [ ] NO  acetaminophen   Tablet .. 650 milliGRAM(s) Oral every 6 hours PRN  albuterol/ipratropium for Nebulization. 3 milliLiter(s) Nebulizer three times a day PRN  ALPRAZolam 0.25 milliGRAM(s) Oral three times a day PRN  amLODIPine   Tablet 10 milliGRAM(s) Oral daily  diphenhydrAMINE   Injectable 25 milliGRAM(s) IV Push every 4 hours PRN  enoxaparin Injectable 40 milliGRAM(s) SubCutaneous daily  levothyroxine 175 MICROGram(s) Oral daily  morphine  - Injectable 2 milliGRAM(s) IV Push every 4 hours PRN  ondansetron Injectable 4 milliGRAM(s) IV Push every 8 hours PRN  pantoprazole  Injectable 40 milliGRAM(s) IV Push daily  polyethylene glycol 3350 17 Gram(s) Oral two times a day  senna 2 Tablet(s) Oral at bedtime  simvastatin 20 milliGRAM(s) Oral at bedtime      HEALTH ISSUES - PROBLEM Dx:  Wheezing: Wheezing  Constipation, acute: Constipation, acute  Discharge planning issues: Discharge planning issues  Preventive measure: Preventive measure  H/O: hypothyroidism: H/O: hypothyroidism  Anxiety: Anxiety  Hyperlipidemia: Hyperlipidemia  Hypertension: Hypertension  Acute pancreatitis: Acute pancreatitis Dieter Rodriguez MD PGY-1  [facility] [phone] II [facility] [id number]      DANY HENDERSON  59y  Female      Patient is a 59y old  Female who presents with a chief complaint of Acute Pancreatitis, Epigastric pain, + N/V, (26 Nov 2019 06:12)      INTERVAL HPI/OVERNIGHT EVENTS: patient c/o restlessness, xanax given by NF. s/p 20mg lasix for diuresis in setting of wheezing/sob      REVIEW OF SYSTEMS:  CONSTITUTIONAL: +abd pain   EYES: No eye pain,ENMT:  No difficulty hearing, tinnitus, vertigo; No sinus or throat pain  NECK: No pain or stiffness  RESPIRATORY: +intermittent wheeze   CARDIOVASCULAR: No chest pain, palpitations, dizziness, or leg swelling  GASTROINTESTINAL: +epigastric pain to L flank  GENITOURINARY: No dysuria, frequency, hematuria, or incontinence  NEUROLOGICAL: No headaches, memory loss, loss of strength, numbness, or tremors  SKIN: No itching, burning, rashes, or lesions   LYMPH NODES: No enlarged glands  ENDOCRINE: No heat or cold intolerance; No hair loss  MUSCULOSKELETAL: No joint pain or swelling; No muscle, back, or extremity pain  PSYCHIATRIC: No depression, anxiety, mood swings, or difficulty sleeping  HEME/LYMPH: No easy bruising, or bleeding gums  ALLERY AND IMMUNOLOGIC: No hives or eczema  FAMILY HISTORY:  Family history of coronary artery disease (Sibling)    T(C): 36.9 (11-27-19 @ 05:56), Max: 37.1 (11-26-19 @ 07:07)  HR: 115 (11-27-19 @ 05:56) (98 - 126)  BP: 122/80 (11-27-19 @ 05:56) (122/80 - 150/82)  RR: 20 (11-27-19 @ 05:56) (18 - 22)  SpO2: 96% (11-27-19 @ 05:56) (93% - 100%)  Wt(kg): --Vital Signs Last 24 Hrs  T(C): 36.9 (27 Nov 2019 05:56), Max: 37.1 (26 Nov 2019 07:07)  T(F): 98.5 (27 Nov 2019 05:56), Max: 98.8 (26 Nov 2019 07:07)  HR: 115 (27 Nov 2019 05:56) (98 - 126)  BP: 122/80 (27 Nov 2019 05:56) (122/80 - 150/82)  BP(mean): --  RR: 20 (27 Nov 2019 05:56) (18 - 22)  SpO2: 96% (27 Nov 2019 05:56) (93% - 100%)  codeine (Unknown)  morphine (Other)  oxycodone (Unknown)  pcn,morphine,dilaudid (Anaphylaxis; Rash)  tramadol (Unknown)      PHYSICAL EXAM:  GENERAL: NAD, well-groomed, well-developed  HEAD:  Atraumatic, Normocephalic  EYES: EOMI, PERRLA, conjunctiva and sclera clear  ENMT: No tonsillar erythema, exudates, or enlargement; Moist mucous membranes, Good dentition, No lesions  NECK: Supple, No JVD, Normal thyroid  NERVOUS SYSTEM:  Alert & Oriented X3, Good concentration; Motor Strength 5/5 B/L upper and lower extremities; DTRs 2+ intact and symmetric  CHEST/LUNG: lung CTA STEPHY  HEART: Regular rate and rhythm; No murmurs, rubs, or gallops  ABDOMEN: +TTP epigastrium  EXTREMITIES:  2+ Peripheral Pulses, trace LE STEPHY edema   LYMPH: No lymphadenopathy noted  SKIN: No rashes or lesions    Consultant(s) Notes Reviewed:  [x ] YES  [ ] NO  Care Discussed with Consultants/Other Providers [ x] YES  [ ] NO    LABS:      RADIOLOGY & ADDITIONAL TESTS:    Imaging Personally Reviewed:  [ ] YES  [ ] NO  acetaminophen   Tablet .. 650 milliGRAM(s) Oral every 6 hours PRN  albuterol/ipratropium for Nebulization. 3 milliLiter(s) Nebulizer three times a day PRN  ALPRAZolam 0.25 milliGRAM(s) Oral three times a day PRN  amLODIPine   Tablet 10 milliGRAM(s) Oral daily  diphenhydrAMINE   Injectable 25 milliGRAM(s) IV Push every 4 hours PRN  enoxaparin Injectable 40 milliGRAM(s) SubCutaneous daily  levothyroxine 175 MICROGram(s) Oral daily  morphine  - Injectable 2 milliGRAM(s) IV Push every 4 hours PRN  ondansetron Injectable 4 milliGRAM(s) IV Push every 8 hours PRN  pantoprazole  Injectable 40 milliGRAM(s) IV Push daily  polyethylene glycol 3350 17 Gram(s) Oral two times a day  senna 2 Tablet(s) Oral at bedtime  simvastatin 20 milliGRAM(s) Oral at bedtime      HEALTH ISSUES - PROBLEM Dx:  Wheezing: Wheezing  Constipation, acute: Constipation, acute  Discharge planning issues: Discharge planning issues  Preventive measure: Preventive measure  H/O: hypothyroidism: H/O: hypothyroidism  Anxiety: Anxiety  Hyperlipidemia: Hyperlipidemia  Hypertension: Hypertension  Acute pancreatitis: Acute pancreatitis

## 2019-11-27 NOTE — PROGRESS NOTE ADULT - PROBLEM SELECTOR PLAN 1
unclear Etiology (no etoh use, no hx of gallstones s/p cholecystectomy), possible Idiopathic Pancreatitis. s/p 2l NS & 1L IVF LR @ 150 cc/hr. morphine 4mg x2 & toradol 15mg x2. sxs improved  -pain control with IV Morphine and IV Benadryl PRN for now  -IV Zofran PRN  -IV Protonix,   -advancing diet as tolerated  -RUQ demonstrates pancreatic duct dilatation, no obstruction   -f/u IgG levels  -triglyceride levels WNL  -holding fluids as PO intake improving

## 2019-11-28 DIAGNOSIS — R06.02 SHORTNESS OF BREATH: ICD-10-CM

## 2019-11-28 DIAGNOSIS — R74.0 NONSPECIFIC ELEVATION OF LEVELS OF TRANSAMINASE AND LACTIC ACID DEHYDROGENASE [LDH]: ICD-10-CM

## 2019-11-28 LAB
ALBUMIN SERPL ELPH-MCNC: 3.3 G/DL — SIGNIFICANT CHANGE UP (ref 3.3–5)
ALP SERPL-CCNC: 213 U/L — HIGH (ref 40–120)
ALT FLD-CCNC: 221 U/L — HIGH (ref 4–33)
ANION GAP SERPL CALC-SCNC: 13 MMO/L — SIGNIFICANT CHANGE UP (ref 7–14)
AST SERPL-CCNC: 127 U/L — HIGH (ref 4–32)
BASOPHILS # BLD AUTO: 0.03 K/UL — SIGNIFICANT CHANGE UP (ref 0–0.2)
BASOPHILS NFR BLD AUTO: 0.2 % — SIGNIFICANT CHANGE UP (ref 0–2)
BILIRUB SERPL-MCNC: 0.5 MG/DL — SIGNIFICANT CHANGE UP (ref 0.2–1.2)
BUN SERPL-MCNC: 8 MG/DL — SIGNIFICANT CHANGE UP (ref 7–23)
CALCIUM SERPL-MCNC: 8.9 MG/DL — SIGNIFICANT CHANGE UP (ref 8.4–10.5)
CHLORIDE SERPL-SCNC: 97 MMOL/L — LOW (ref 98–107)
CO2 SERPL-SCNC: 26 MMOL/L — SIGNIFICANT CHANGE UP (ref 22–31)
CREAT SERPL-MCNC: 0.63 MG/DL — SIGNIFICANT CHANGE UP (ref 0.5–1.3)
EOSINOPHIL # BLD AUTO: 0.09 K/UL — SIGNIFICANT CHANGE UP (ref 0–0.5)
EOSINOPHIL NFR BLD AUTO: 0.7 % — SIGNIFICANT CHANGE UP (ref 0–6)
GLUCOSE SERPL-MCNC: 114 MG/DL — HIGH (ref 70–99)
HCT VFR BLD CALC: 40 % — SIGNIFICANT CHANGE UP (ref 34.5–45)
HGB BLD-MCNC: 13.3 G/DL — SIGNIFICANT CHANGE UP (ref 11.5–15.5)
IMM GRANULOCYTES NFR BLD AUTO: 1.2 % — SIGNIFICANT CHANGE UP (ref 0–1.5)
LYMPHOCYTES # BLD AUTO: 0.8 K/UL — LOW (ref 1–3.3)
LYMPHOCYTES # BLD AUTO: 5.9 % — LOW (ref 13–44)
MAGNESIUM SERPL-MCNC: 2.1 MG/DL — SIGNIFICANT CHANGE UP (ref 1.6–2.6)
MCHC RBC-ENTMCNC: 27.8 PG — SIGNIFICANT CHANGE UP (ref 27–34)
MCHC RBC-ENTMCNC: 33.3 % — SIGNIFICANT CHANGE UP (ref 32–36)
MCV RBC AUTO: 83.5 FL — SIGNIFICANT CHANGE UP (ref 80–100)
MONOCYTES # BLD AUTO: 0.95 K/UL — HIGH (ref 0–0.9)
MONOCYTES NFR BLD AUTO: 7 % — SIGNIFICANT CHANGE UP (ref 2–14)
NEUTROPHILS # BLD AUTO: 11.5 K/UL — HIGH (ref 1.8–7.4)
NEUTROPHILS NFR BLD AUTO: 85 % — HIGH (ref 43–77)
NRBC # FLD: 0 K/UL — SIGNIFICANT CHANGE UP (ref 0–0)
PHOSPHATE SERPL-MCNC: 2.9 MG/DL — SIGNIFICANT CHANGE UP (ref 2.5–4.5)
PLATELET # BLD AUTO: 328 K/UL — SIGNIFICANT CHANGE UP (ref 150–400)
PMV BLD: 9.4 FL — SIGNIFICANT CHANGE UP (ref 7–13)
POTASSIUM SERPL-MCNC: 3.4 MMOL/L — LOW (ref 3.5–5.3)
POTASSIUM SERPL-SCNC: 3.4 MMOL/L — LOW (ref 3.5–5.3)
PROT SERPL-MCNC: 6.5 G/DL — SIGNIFICANT CHANGE UP (ref 6–8.3)
RBC # BLD: 4.79 M/UL — SIGNIFICANT CHANGE UP (ref 3.8–5.2)
RBC # FLD: 13.7 % — SIGNIFICANT CHANGE UP (ref 10.3–14.5)
SODIUM SERPL-SCNC: 136 MMOL/L — SIGNIFICANT CHANGE UP (ref 135–145)
WBC # BLD: 13.53 K/UL — HIGH (ref 3.8–10.5)
WBC # FLD AUTO: 13.53 K/UL — HIGH (ref 3.8–10.5)

## 2019-11-28 PROCEDURE — 99233 SBSQ HOSP IP/OBS HIGH 50: CPT | Mod: GC

## 2019-11-28 PROCEDURE — 71275 CT ANGIOGRAPHY CHEST: CPT | Mod: 26

## 2019-11-28 PROCEDURE — 93010 ELECTROCARDIOGRAM REPORT: CPT

## 2019-11-28 PROCEDURE — 71046 X-RAY EXAM CHEST 2 VIEWS: CPT | Mod: 26

## 2019-11-28 RX ORDER — POTASSIUM CHLORIDE 20 MEQ
40 PACKET (EA) ORAL ONCE
Refills: 0 | Status: COMPLETED | OUTPATIENT
Start: 2019-11-28 | End: 2019-11-28

## 2019-11-28 RX ADMIN — MORPHINE SULFATE 2 MILLIGRAM(S): 50 CAPSULE, EXTENDED RELEASE ORAL at 03:10

## 2019-11-28 RX ADMIN — MORPHINE SULFATE 2 MILLIGRAM(S): 50 CAPSULE, EXTENDED RELEASE ORAL at 13:01

## 2019-11-28 RX ADMIN — Medication 0.25 MILLIGRAM(S): at 23:19

## 2019-11-28 RX ADMIN — Medication 0.25 MILLIGRAM(S): at 00:28

## 2019-11-28 RX ADMIN — MORPHINE SULFATE 2 MILLIGRAM(S): 50 CAPSULE, EXTENDED RELEASE ORAL at 07:17

## 2019-11-28 RX ADMIN — Medication 25 MILLIGRAM(S): at 21:07

## 2019-11-28 RX ADMIN — MORPHINE SULFATE 2 MILLIGRAM(S): 50 CAPSULE, EXTENDED RELEASE ORAL at 21:23

## 2019-11-28 RX ADMIN — AMLODIPINE BESYLATE 10 MILLIGRAM(S): 2.5 TABLET ORAL at 07:16

## 2019-11-28 RX ADMIN — ENOXAPARIN SODIUM 40 MILLIGRAM(S): 100 INJECTION SUBCUTANEOUS at 12:47

## 2019-11-28 RX ADMIN — Medication 25 MILLIGRAM(S): at 03:10

## 2019-11-28 RX ADMIN — Medication 175 MICROGRAM(S): at 07:16

## 2019-11-28 RX ADMIN — MORPHINE SULFATE 2 MILLIGRAM(S): 50 CAPSULE, EXTENDED RELEASE ORAL at 21:07

## 2019-11-28 RX ADMIN — Medication 25 MILLIGRAM(S): at 07:16

## 2019-11-28 RX ADMIN — MORPHINE SULFATE 2 MILLIGRAM(S): 50 CAPSULE, EXTENDED RELEASE ORAL at 12:46

## 2019-11-28 RX ADMIN — POLYETHYLENE GLYCOL 3350 17 GRAM(S): 17 POWDER, FOR SOLUTION ORAL at 07:16

## 2019-11-28 RX ADMIN — SENNA PLUS 2 TABLET(S): 8.6 TABLET ORAL at 21:25

## 2019-11-28 RX ADMIN — SIMVASTATIN 20 MILLIGRAM(S): 20 TABLET, FILM COATED ORAL at 21:25

## 2019-11-28 RX ADMIN — Medication 25 MILLIGRAM(S): at 12:46

## 2019-11-28 RX ADMIN — ONDANSETRON 4 MILLIGRAM(S): 8 TABLET, FILM COATED ORAL at 03:16

## 2019-11-28 RX ADMIN — MORPHINE SULFATE 2 MILLIGRAM(S): 50 CAPSULE, EXTENDED RELEASE ORAL at 07:32

## 2019-11-28 RX ADMIN — Medication 40 MILLIEQUIVALENT(S): at 18:24

## 2019-11-28 RX ADMIN — ONDANSETRON 4 MILLIGRAM(S): 8 TABLET, FILM COATED ORAL at 18:28

## 2019-11-28 RX ADMIN — POLYETHYLENE GLYCOL 3350 17 GRAM(S): 17 POWDER, FOR SOLUTION ORAL at 18:24

## 2019-11-28 RX ADMIN — PANTOPRAZOLE SODIUM 40 MILLIGRAM(S): 20 TABLET, DELAYED RELEASE ORAL at 12:47

## 2019-11-28 RX ADMIN — MORPHINE SULFATE 2 MILLIGRAM(S): 50 CAPSULE, EXTENDED RELEASE ORAL at 03:25

## 2019-11-28 NOTE — PROGRESS NOTE ADULT - PROBLEM SELECTOR PLAN 6
-C/w home dose of xanax 0.25mg PO TID Patient reports multiple bowel movements and improvement of constipation   -daily senna  -s/p miralax  -tap water enema  -judicious use of opioid pain medications

## 2019-11-28 NOTE — PROGRESS NOTE ADULT - PROBLEM SELECTOR PLAN 9
Transitions of Care Status:  1.  Name of PCP: Dr. Couch  2.  PCP Contacted on Admission: [ ] Y    [X] N- admitted overnight  3.  PCP contacted at Discharge: [ ] Y    [ ] N    [ ] N/A  4.  Post-Discharge Appointment Date and Location:  5.  Summary of Handoff given to PCP:  Dispo: pending clinical improvement DVT Prophylaxis: Lovenox 40 sq

## 2019-11-28 NOTE — PROGRESS NOTE ADULT - PROBLEM SELECTOR PLAN 1
unclear Etiology (no etoh use, no hx of gallstones s/p cholecystectomy), possible Idiopathic Pancreatitis. s/p 2l NS & 1L IVF LR @ 150 cc/hr. morphine 4mg x2 & toradol 15mg x2. sxs improved  -pain control with IV Morphine and IV Benadryl PRN for now  -IV Zofran PRN  -IV Protonix,   -advancing diet as tolerated  -RUQ demonstrates pancreatic duct dilatation, no obstruction   -f/u IgG levels  -triglyceride levels WNL  -holding fluids as PO intake improving unclear Etiology (no etoh use, no hx of gallstones s/p cholecystectomy), possible Idiopathic Pancreatitis. s/p 2l NS & 1L IVF LR @ 150 cc/hr. morphine 4mg x2 & toradol 15mg x2. sxs improved  -pain control with IV Morphine and IV Benadryl PRN for now  -IV Zofran PRN  -IV Protonix,   -advancing diet as tolerated  -RUQ demonstrates pancreatic duct dilatation, no obstruction   -f/u IgG levels  -triglyceride levels WNL  -holding fluids as PO intake improving  -transaminitis (plan below)

## 2019-11-28 NOTE — PROGRESS NOTE ADULT - SUBJECTIVE AND OBJECTIVE BOX
Dieter Rodriguez MD PGY-1  [facility] [phone] II [facility] [address]      DANY HENDERSON  59y  Female      Patient is a 59y old  Female who presents with a chief complaint of Acute Pancreatitis, Epigastric pain, + N/V, (27 Nov 2019 06:42)      INTERVAL HPI/OVERNIGHT EVENTS: No O/N events.       REVIEW OF SYSTEMS:  CONSTITUTIONAL: +abd pain   EYES: No eye pain,ENMT:  No difficulty hearing, tinnitus, vertigo; No sinus or throat pain  NECK: No pain or stiffness  RESPIRATORY: +intermittent wheeze   CARDIOVASCULAR: No chest pain, palpitations, dizziness, or leg swelling  GASTROINTESTINAL: +epigastric pain to L flank  GENITOURINARY: No dysuria, frequency, hematuria, or incontinence  NEUROLOGICAL: No headaches, memory loss, loss of strength, numbness, or tremors  SKIN: No itching, burning, rashes, or lesions   LYMPH NODES: No enlarged glands  ENDOCRINE: No heat or cold intolerance; No hair loss  MUSCULOSKELETAL: No joint pain or swelling; No muscle, back, or extremity pain  PSYCHIATRIC: No depression, anxiety, mood swings, or difficulty sleeping  HEME/LYMPH: No easy bruising, or bleeding gums  ALLERY AND IMMUNOLOGIC: No hives or eczema  FAMILY HISTORY:  Family history of coronary artery disease (Sibling)    T(C): 37.2 (11-28-19 @ 03:07), Max: 37.2 (11-27-19 @ 21:32)  HR: 112 (11-28-19 @ 03:07) (110 - 122)  BP: 134/75 (11-28-19 @ 03:07) (124/70 - 134/75)  RR: 18 (11-28-19 @ 03:07) (18 - 20)  SpO2: 98% (11-28-19 @ 03:07) (97% - 98%)  Wt(kg): --Vital Signs Last 24 Hrs  T(C): 37.2 (28 Nov 2019 03:07), Max: 37.2 (27 Nov 2019 21:32)  T(F): 99 (28 Nov 2019 03:07), Max: 99 (27 Nov 2019 21:32)  HR: 112 (28 Nov 2019 03:07) (110 - 122)  BP: 134/75 (28 Nov 2019 03:07) (124/70 - 134/75)  BP(mean): --  RR: 18 (28 Nov 2019 03:07) (18 - 20)  SpO2: 98% (28 Nov 2019 03:07) (97% - 98%)  codeine (Unknown)  morphine (Other)  oxycodone (Unknown)  pcn,morphine,dilaudid (Anaphylaxis; Rash)  tramadol (Unknown)      PHYSICAL EXAM:  GENERAL: NAD, well-groomed, well-developed  HEAD:  Atraumatic, Normocephalic  EYES: EOMI, PERRLA, conjunctiva and sclera clear  ENMT: No tonsillar erythema, exudates, or enlargement; Moist mucous membranes, Good dentition, No lesions  NECK: Supple, No JVD, Normal thyroid  NERVOUS SYSTEM:  Alert & Oriented X3, Good concentration; Motor Strength 5/5 B/L upper and lower extremities; DTRs 2+ intact and symmetric  CHEST/LUNG: lung CTA STEPHY  HEART: Regular rate and rhythm; No murmurs, rubs, or gallops  ABDOMEN: +TTP epigastrium  EXTREMITIES:  2+ Peripheral Pulses, trace LE STEPHY edema   LYMPH: No lymphadenopathy noted  SKIN: No rashes or lesions  Consultant(s) Notes Reviewed:  [x ] YES  [ ] NO  Care Discussed with Consultants/Other Providers [ x] YES  [ ] NO    LABS:      RADIOLOGY & ADDITIONAL TESTS:  < from: Xray Chest 1 View- PORTABLE-Urgent (11.27.19 @ 09:07) >    EXAM:  XR CHEST PORTABLE URGENT 1V    PROCEDURE DATE:  Nov 27 2019   INTERPRETATION:  EXAMINATION: XR CHEST URGENT  CLINICAL INDICATION: sob  TECHNIQUE: Frontal radiograph of the chest was obtained.  COMPARISON: 11/20/2014.  FINDINGS:   Cardiac silhouette normal in size. Left basilar opacity. Lungs otherwise   clear. No pneumothorax.  IMPRESSION:   Left basilar opacity may represent effusion and atelectasis versus   infection. PA/lateral recommended for complete evaluation.    < end of copied text >        Imaging Personally Reviewed:  [X ] YES  [ ] NO  acetaminophen   Tablet .. 650 milliGRAM(s) Oral every 6 hours PRN  albuterol/ipratropium for Nebulization. 3 milliLiter(s) Nebulizer three times a day PRN  ALPRAZolam 0.25 milliGRAM(s) Oral three times a day PRN  amLODIPine   Tablet 10 milliGRAM(s) Oral daily  diphenhydrAMINE   Injectable 25 milliGRAM(s) IV Push every 4 hours PRN  enoxaparin Injectable 40 milliGRAM(s) SubCutaneous daily  levothyroxine 175 MICROGram(s) Oral daily  morphine  - Injectable 2 milliGRAM(s) IV Push every 4 hours PRN  ondansetron Injectable 4 milliGRAM(s) IV Push every 8 hours PRN  pantoprazole  Injectable 40 milliGRAM(s) IV Push daily  polyethylene glycol 3350 17 Gram(s) Oral two times a day  senna 2 Tablet(s) Oral at bedtime  simvastatin 20 milliGRAM(s) Oral at bedtime      HEALTH ISSUES - PROBLEM Dx:  Wheezing: Wheezing  Constipation, acute: Constipation, acute  Discharge planning issues: Discharge planning issues  Preventive measure: Preventive measure  H/O: hypothyroidism: H/O: hypothyroidism  Anxiety: Anxiety  Hyperlipidemia: Hyperlipidemia  Hypertension: Hypertension  Acute pancreatitis: Acute pancreatitis Dieter Rodriguez MD PGY-1  [facility] [phone] II [facility] [address]      DANY HENDERSON  59y  Female      Patient is a 59y old  Female who presents with a chief complaint of Acute Pancreatitis, Epigastric pain, + N/V, (27 Nov 2019 06:42)      INTERVAL HPI/OVERNIGHT EVENTS: No O/N events.       REVIEW OF SYSTEMS:  CONSTITUTIONAL: +abd pain +SOB  EYES: No eye pain,ENMT:  No difficulty hearing, tinnitus, vertigo; No sinus or throat pain  NECK: No pain or stiffness  RESPIRATORY: +intermittent wheeze, +SOB when supine   CARDIOVASCULAR: No chest pain, palpitations, dizziness, or leg swelling  GASTROINTESTINAL: +epigastric pain to L flank  GENITOURINARY: No dysuria, frequency, hematuria, or incontinence  NEUROLOGICAL: No headaches, memory loss, loss of strength, numbness, or tremors  SKIN: No itching, burning, rashes, or lesions   LYMPH NODES: No enlarged glands  ENDOCRINE: No heat or cold intolerance; No hair loss  MUSCULOSKELETAL: No joint pain or swelling; No muscle, back, or extremity pain  PSYCHIATRIC: No depression, anxiety, mood swings, or difficulty sleeping  HEME/LYMPH: No easy bruising, or bleeding gums  ALLERY AND IMMUNOLOGIC: No hives or eczema  FAMILY HISTORY:  Family history of coronary artery disease (Sibling)    T(C): 37.2 (11-28-19 @ 03:07), Max: 37.2 (11-27-19 @ 21:32)  HR: 112 (11-28-19 @ 03:07) (110 - 122)  BP: 134/75 (11-28-19 @ 03:07) (124/70 - 134/75)  RR: 18 (11-28-19 @ 03:07) (18 - 20)  SpO2: 98% (11-28-19 @ 03:07) (97% - 98%)  Wt(kg): --Vital Signs Last 24 Hrs  T(C): 37.2 (28 Nov 2019 03:07), Max: 37.2 (27 Nov 2019 21:32)  T(F): 99 (28 Nov 2019 03:07), Max: 99 (27 Nov 2019 21:32)  HR: 112 (28 Nov 2019 03:07) (110 - 122)  BP: 134/75 (28 Nov 2019 03:07) (124/70 - 134/75)  BP(mean): --  RR: 18 (28 Nov 2019 03:07) (18 - 20)  SpO2: 98% (28 Nov 2019 03:07) (97% - 98%)  codeine (Unknown)  morphine (Other)  oxycodone (Unknown)  pcn,morphine,dilaudid (Anaphylaxis; Rash)  tramadol (Unknown)      PHYSICAL EXAM:  GENERAL: NAD, well-groomed, well-developed  HEAD:  Atraumatic, Normocephalic  EYES: EOMI, PERRLA, conjunctiva and sclera clear  ENMT: No tonsillar erythema, exudates, or enlargement; Moist mucous membranes, Good dentition, No lesions  NECK: Supple, No JVD, Normal thyroid  NERVOUS SYSTEM:  Alert & Oriented X3, Good concentration; Motor Strength 5/5 B/L upper and lower extremities; DTRs 2+ intact and symmetric  CHEST/LUNG: lung CTA STEPHY  HEART: Regular rate and rhythm; No murmurs, rubs, or gallops  ABDOMEN: +TTP epigastrium  EXTREMITIES:  2+ Peripheral Pulses, trace LE STEPHY edema   LYMPH: No lymphadenopathy noted  SKIN: No rashes or lesions  Consultant(s) Notes Reviewed:  [x ] YES  [ ] NO  Care Discussed with Consultants/Other Providers [ x] YES  [ ] NO    LABS:      RADIOLOGY & ADDITIONAL TESTS:  < from: Xray Chest 1 View- PORTABLE-Urgent (11.27.19 @ 09:07) >    EXAM:  XR CHEST PORTABLE URGENT 1V    PROCEDURE DATE:  Nov 27 2019   INTERPRETATION:  EXAMINATION: XR CHEST URGENT  CLINICAL INDICATION: sob  TECHNIQUE: Frontal radiograph of the chest was obtained.  COMPARISON: 11/20/2014.  FINDINGS:   Cardiac silhouette normal in size. Left basilar opacity. Lungs otherwise   clear. No pneumothorax.  IMPRESSION:   Left basilar opacity may represent effusion and atelectasis versus   infection. PA/lateral recommended for complete evaluation.    < end of copied text >        Imaging Personally Reviewed:  [X ] YES  [ ] NO  acetaminophen   Tablet .. 650 milliGRAM(s) Oral every 6 hours PRN  albuterol/ipratropium for Nebulization. 3 milliLiter(s) Nebulizer three times a day PRN  ALPRAZolam 0.25 milliGRAM(s) Oral three times a day PRN  amLODIPine   Tablet 10 milliGRAM(s) Oral daily  diphenhydrAMINE   Injectable 25 milliGRAM(s) IV Push every 4 hours PRN  enoxaparin Injectable 40 milliGRAM(s) SubCutaneous daily  levothyroxine 175 MICROGram(s) Oral daily  morphine  - Injectable 2 milliGRAM(s) IV Push every 4 hours PRN  ondansetron Injectable 4 milliGRAM(s) IV Push every 8 hours PRN  pantoprazole  Injectable 40 milliGRAM(s) IV Push daily  polyethylene glycol 3350 17 Gram(s) Oral two times a day  senna 2 Tablet(s) Oral at bedtime  simvastatin 20 milliGRAM(s) Oral at bedtime      HEALTH ISSUES - PROBLEM Dx:  Wheezing: Wheezing  Constipation, acute: Constipation, acute  Discharge planning issues: Discharge planning issues  Preventive measure: Preventive measure  H/O: hypothyroidism: H/O: hypothyroidism  Anxiety: Anxiety  Hyperlipidemia: Hyperlipidemia  Hypertension: Hypertension  Acute pancreatitis: Acute pancreatitis

## 2019-11-28 NOTE — PROGRESS NOTE ADULT - PROBLEM SELECTOR PLAN 5
Patient reports multiple bowel movements and improvement of constipation   -daily senna  -s/p miralax  -tap water enema  -judicious use of opioid pain medications -c/w Zocor

## 2019-11-28 NOTE — PROGRESS NOTE ADULT - PROBLEM SELECTOR PLAN 3
- c/w Norvasc 10mg qd  - continue to monitor Patient w complaints of wheezing and SOB, worse when supine, with newly noted LLL opacity on CXR c/w effusion  -intermittent wheeze responsive to duonebs   -patient denies hx RAD  -s/p lasix 20mg x3  -continue to observe for signs of infxn  -consider tx for PNA if febrile, uptrending WBC

## 2019-11-28 NOTE — PROGRESS NOTE ADULT - PROBLEM SELECTOR PLAN 2
Patient w complaints of wheezing and SOB, worse when supine, with newly noted LLL opacity on CXR  -intermittent wheeze responsive to duonebs   -patient denies hx RAD  -s/p lasix 20mg x3  -continue to observe for signs of infxn  -consider tx for PNA if febrile, uptrending WBC Patient with new onset transaminitis x3 days in the setting of acute pancreatitis. Patient s/p cholecystectomy after previous episode of pancreatitis 13 years ago.   -trend LFTs  -avoid hepatotoxic meds   -hepatology consult

## 2019-11-29 LAB
ALBUMIN SERPL ELPH-MCNC: 3.3 G/DL — SIGNIFICANT CHANGE UP (ref 3.3–5)
ALP SERPL-CCNC: 256 U/L — HIGH (ref 40–120)
ALT FLD-CCNC: 301 U/L — HIGH (ref 4–33)
ANION GAP SERPL CALC-SCNC: 14 MMO/L — SIGNIFICANT CHANGE UP (ref 7–14)
AST SERPL-CCNC: 163 U/L — HIGH (ref 4–32)
BASOPHILS # BLD AUTO: 0.03 K/UL — SIGNIFICANT CHANGE UP (ref 0–0.2)
BASOPHILS NFR BLD AUTO: 0.2 % — SIGNIFICANT CHANGE UP (ref 0–2)
BILIRUB SERPL-MCNC: 0.5 MG/DL — SIGNIFICANT CHANGE UP (ref 0.2–1.2)
BUN SERPL-MCNC: 9 MG/DL — SIGNIFICANT CHANGE UP (ref 7–23)
CALCIUM SERPL-MCNC: 9.1 MG/DL — SIGNIFICANT CHANGE UP (ref 8.4–10.5)
CHLORIDE SERPL-SCNC: 98 MMOL/L — SIGNIFICANT CHANGE UP (ref 98–107)
CO2 SERPL-SCNC: 24 MMOL/L — SIGNIFICANT CHANGE UP (ref 22–31)
CREAT SERPL-MCNC: 0.62 MG/DL — SIGNIFICANT CHANGE UP (ref 0.5–1.3)
EOSINOPHIL # BLD AUTO: 0.08 K/UL — SIGNIFICANT CHANGE UP (ref 0–0.5)
EOSINOPHIL NFR BLD AUTO: 0.7 % — SIGNIFICANT CHANGE UP (ref 0–6)
GLUCOSE SERPL-MCNC: 93 MG/DL — SIGNIFICANT CHANGE UP (ref 70–99)
HCT VFR BLD CALC: 42.7 % — SIGNIFICANT CHANGE UP (ref 34.5–45)
HGB BLD-MCNC: 14.1 G/DL — SIGNIFICANT CHANGE UP (ref 11.5–15.5)
IMM GRANULOCYTES NFR BLD AUTO: 1.9 % — HIGH (ref 0–1.5)
LYMPHOCYTES # BLD AUTO: 0.92 K/UL — LOW (ref 1–3.3)
LYMPHOCYTES # BLD AUTO: 7.7 % — LOW (ref 13–44)
MAGNESIUM SERPL-MCNC: 2.2 MG/DL — SIGNIFICANT CHANGE UP (ref 1.6–2.6)
MCHC RBC-ENTMCNC: 28.1 PG — SIGNIFICANT CHANGE UP (ref 27–34)
MCHC RBC-ENTMCNC: 33 % — SIGNIFICANT CHANGE UP (ref 32–36)
MCV RBC AUTO: 85.2 FL — SIGNIFICANT CHANGE UP (ref 80–100)
MONOCYTES # BLD AUTO: 1.03 K/UL — HIGH (ref 0–0.9)
MONOCYTES NFR BLD AUTO: 8.6 % — SIGNIFICANT CHANGE UP (ref 2–14)
NEUTROPHILS # BLD AUTO: 9.72 K/UL — HIGH (ref 1.8–7.4)
NEUTROPHILS NFR BLD AUTO: 80.9 % — HIGH (ref 43–77)
NRBC # FLD: 0 K/UL — SIGNIFICANT CHANGE UP (ref 0–0)
PHOSPHATE SERPL-MCNC: 3.4 MG/DL — SIGNIFICANT CHANGE UP (ref 2.5–4.5)
PLATELET # BLD AUTO: 351 K/UL — SIGNIFICANT CHANGE UP (ref 150–400)
PMV BLD: 9.2 FL — SIGNIFICANT CHANGE UP (ref 7–13)
POTASSIUM SERPL-MCNC: 3.5 MMOL/L — SIGNIFICANT CHANGE UP (ref 3.5–5.3)
POTASSIUM SERPL-SCNC: 3.5 MMOL/L — SIGNIFICANT CHANGE UP (ref 3.5–5.3)
PROT SERPL-MCNC: 6.5 G/DL — SIGNIFICANT CHANGE UP (ref 6–8.3)
RBC # BLD: 5.01 M/UL — SIGNIFICANT CHANGE UP (ref 3.8–5.2)
RBC # FLD: 13.8 % — SIGNIFICANT CHANGE UP (ref 10.3–14.5)
SODIUM SERPL-SCNC: 136 MMOL/L — SIGNIFICANT CHANGE UP (ref 135–145)
WBC # BLD: 12.01 K/UL — HIGH (ref 3.8–10.5)
WBC # FLD AUTO: 12.01 K/UL — HIGH (ref 3.8–10.5)

## 2019-11-29 PROCEDURE — 99233 SBSQ HOSP IP/OBS HIGH 50: CPT | Mod: GC

## 2019-11-29 PROCEDURE — 99221 1ST HOSP IP/OBS SF/LOW 40: CPT

## 2019-11-29 RX ADMIN — MORPHINE SULFATE 2 MILLIGRAM(S): 50 CAPSULE, EXTENDED RELEASE ORAL at 11:50

## 2019-11-29 RX ADMIN — PANTOPRAZOLE SODIUM 40 MILLIGRAM(S): 20 TABLET, DELAYED RELEASE ORAL at 11:50

## 2019-11-29 RX ADMIN — MORPHINE SULFATE 2 MILLIGRAM(S): 50 CAPSULE, EXTENDED RELEASE ORAL at 12:05

## 2019-11-29 RX ADMIN — MORPHINE SULFATE 2 MILLIGRAM(S): 50 CAPSULE, EXTENDED RELEASE ORAL at 20:46

## 2019-11-29 RX ADMIN — SIMVASTATIN 20 MILLIGRAM(S): 20 TABLET, FILM COATED ORAL at 21:05

## 2019-11-29 RX ADMIN — Medication 175 MICROGRAM(S): at 07:50

## 2019-11-29 RX ADMIN — SENNA PLUS 2 TABLET(S): 8.6 TABLET ORAL at 21:05

## 2019-11-29 RX ADMIN — MORPHINE SULFATE 2 MILLIGRAM(S): 50 CAPSULE, EXTENDED RELEASE ORAL at 21:01

## 2019-11-29 RX ADMIN — ENOXAPARIN SODIUM 40 MILLIGRAM(S): 100 INJECTION SUBCUTANEOUS at 11:51

## 2019-11-29 RX ADMIN — MORPHINE SULFATE 2 MILLIGRAM(S): 50 CAPSULE, EXTENDED RELEASE ORAL at 02:18

## 2019-11-29 RX ADMIN — MORPHINE SULFATE 2 MILLIGRAM(S): 50 CAPSULE, EXTENDED RELEASE ORAL at 02:03

## 2019-11-29 RX ADMIN — AMLODIPINE BESYLATE 10 MILLIGRAM(S): 2.5 TABLET ORAL at 07:51

## 2019-11-29 RX ADMIN — Medication 25 MILLIGRAM(S): at 02:02

## 2019-11-29 RX ADMIN — POLYETHYLENE GLYCOL 3350 17 GRAM(S): 17 POWDER, FOR SOLUTION ORAL at 07:50

## 2019-11-29 RX ADMIN — Medication 25 MILLIGRAM(S): at 20:45

## 2019-11-29 NOTE — PROGRESS NOTE ADULT - PROBLEM SELECTOR PLAN 1
unclear Etiology (no etoh use, no hx of gallstones s/p cholecystectomy), possible Idiopathic Pancreatitis. s/p 2l NS & 1L IVF LR @ 150 cc/hr. morphine 4mg x2 & toradol 15mg x2. sxs improved  -pain control with IV Morphine and IV Benadryl PRN for now  -IV Zofran PRN  -IV Protonix,   -advancing diet as tolerated  -RUQ demonstrates pancreatic duct dilatation, no obstruction   -f/u IgG levels  -triglyceride levels WNL  -holding fluids as PO intake improving  -transaminitis (plan below) unclear Etiology (no etoh use, no hx of gallstones s/p cholecystectomy), possible Idiopathic Pancreatitis. s/p 2l NS & 1L IVF LR @ 150 cc/hr. morphine 4mg x2 & toradol 15mg x2. sxs improved  -pain control with IV Morphine and IV Benadryl PRN for now  -IV Zofran PRN  -IV Protonix,   -advancing diet as tolerated  -RUQ demonstrates pancreatic duct dilatation, no obstruction   -f/u IgG levels  -triglyceride levels WNL  -transaminitis (plan below)  -GI consulted, follow-up recs

## 2019-11-29 NOTE — PROGRESS NOTE ADULT - PROBLEM SELECTOR PLAN 6
Patient reports multiple bowel movements and improvement of constipation   -daily senna  -s/p miralax  -tap water enema  -judicious use of opioid pain medications Patient reports multiple bowel movements and improvement of constipation   -daily senna  -daily miralax  -will try dulcolax suppository   -judicious use of opioid pain medications

## 2019-11-29 NOTE — CONSULT NOTE ADULT - SUBJECTIVE AND OBJECTIVE BOX
Chief Complaint:  Patient is a 59y old  Female who presents with a chief complaint of Acute Pancreatitis, Epigastric pain, + N/V, (29 Nov 2019 06:53)      HPI:    Allergies:  codeine (Unknown)  morphine (Other)  oxycodone (Unknown)  pcn,morphine,dilaudid (Anaphylaxis; Rash)  tramadol (Unknown)      Home Medications:        Hospital Medications:  acetaminophen   Tablet .. 650 milliGRAM(s) Oral every 6 hours PRN  albuterol/ipratropium for Nebulization. 3 milliLiter(s) Nebulizer three times a day PRN  ALPRAZolam 0.25 milliGRAM(s) Oral three times a day PRN  amLODIPine   Tablet 10 milliGRAM(s) Oral daily  diphenhydrAMINE   Injectable 25 milliGRAM(s) IV Push every 4 hours PRN  enoxaparin Injectable 40 milliGRAM(s) SubCutaneous daily  levothyroxine 175 MICROGram(s) Oral daily  morphine  - Injectable 2 milliGRAM(s) IV Push every 4 hours PRN  ondansetron Injectable 4 milliGRAM(s) IV Push every 8 hours PRN  pantoprazole  Injectable 40 milliGRAM(s) IV Push daily  polyethylene glycol 3350 17 Gram(s) Oral two times a day  senna 2 Tablet(s) Oral at bedtime  simvastatin 20 milliGRAM(s) Oral at bedtime      PMHX/PSHX:  Acute pancreatitis  Obesity  Anxiety  Hyperlipidemia  Hypertension  Female breast neoplasm  Female breast neoplasm  Female breast neoplasm  Superficial Phlebitis  Superficial Phlebitis  H/O: hypothyroidism  H/O: hypothyroidism  S/P lumpectomy, left breast  S/P thyroid surgery  Hx of cholecystectomy      Family history:  Family history of coronary artery disease (Sibling)      Social History:     ROS:     General:  No weight loss, fevers, chills, night sweats, fatigue  Eyes:  No vision changes, no yellowing of eyes   ENT:  No throat pain, runny nose  CV:  No chest pain, palpitations  Resp:  No SOB, cough, wheezing  GI:  See HPI  :  No burning with urination, no hematuria   Muscle:  No muscle pain, weakness  Neuro:  No numbness/tingling, memory problems  Psych:  No fatigue, insomnia, mood problems  Heme:  No easy bruisability  Skin:  No rash, itching       PHYSICAL EXAM:     GENERAL:  Appears stated age, well-groomed, well-nourished, no distress  HEENT:  NC/AT,  conjunctivae clear and pink,  no JVD  CHEST:  Full & symmetric excursion, no increased effort, breath sounds clear  HEART:  Regular rhythm, S1, S2, no murmur/rub/S3/S4, no abdominal bruit, no edema  ABDOMEN:  Soft, non-tender, non-distended, normoactive bowel sounds,  no masses ,  EXTREMITIES:  no cyanosis,clubbing or edema  SKIN:  No rash/erythema/ecchymoses/petechiae/wounds/abscess/warm/dry  NEURO:  Alert, oriented    Vital Signs:  Vital Signs Last 24 Hrs  T(C): 36.7 (29 Nov 2019 11:50), Max: 36.8 (28 Nov 2019 21:03)  T(F): 98 (29 Nov 2019 11:50), Max: 98.3 (28 Nov 2019 21:03)  HR: 113 (29 Nov 2019 11:50) (108 - 118)  BP: 124/78 (29 Nov 2019 11:50) (124/78 - 137/83)  BP(mean): --  RR: 16 (29 Nov 2019 11:50) (16 - 18)  SpO2: 98% (29 Nov 2019 11:50) (96% - 98%)  Daily     Daily     LABS:                        14.1   12.01 )-----------( 351      ( 29 Nov 2019 05:10 )             42.7     11-29    136  |  98  |  9   ----------------------------<  93  3.5   |  24  |  0.62    Ca    9.1      29 Nov 2019 05:10  Phos  3.4     11-29  Mg     2.2     11-29    TPro  6.5  /  Alb  3.3  /  TBili  0.5  /  DBili  x   /  AST  163<H>  /  ALT  301<H>  /  AlkPhos  256<H>  11-29    LIVER FUNCTIONS - ( 29 Nov 2019 05:10 )  Alb: 3.3 g/dL / Pro: 6.5 g/dL / ALK PHOS: 256 u/L / ALT: 301 u/L / AST: 163 u/L / GGT: x                   Imaging: Chief Complaint:  Patient is a 59y old  Female who presents with a chief complaint of Acute Pancreatitis, Epigastric pain, + N/V, (29 Nov 2019 06:53)      HPI:      59 year old woman with history of pancreatitis, history of cholecystectomy, hypothyroidism on Synthroid, obesity and anxiety presents for abdominal pain with radiation to back, found to have acute pancreatitis with markedly elevated serum lipase.  Gastroenterology consulted for progressive elevation of liver enzymes. On further history, patient reports last episode of pancreatitis was approx. 12-13 years ago and her liver enzymes were "in the 30,000s" at that time. She reports her liver function eventually normalized but the underlying cause of her pancreatitis was never elucidated. She was told she has idiopathic pancreatitis. Patient has cholecystectomy although no gallstones were never found. She has also had an ERCP with placement of stent which was later removed. She denies family history of pancreatitis,  new medications, alcohol use, recent trauma or recent surgery. Her abdominal pain has improved this admission and she is currently tolerating solid foods today.     Allergies:  codeine (Unknown)  morphine (Other)  oxycodone (Unknown)  pcn,morphine,dilaudid (Anaphylaxis; Rash)  tramadol (Unknown)      Home Medications:    · 	Zocor 20 mg oral tablet: Last Dose Taken:  , 1 tab(s) orally once a day (at bedtime)  · 	Norvasc 10 mg oral tablet: Last Dose Taken:  , 1 tab(s) orally once a day  · 	Synthroid 175 mcg (0.175 mg) oral tablet: Last Dose Taken:  , 1 tab(s) orally once a day  · 	Xanax 0.5 mg oral tablet: Last Dose Taken:  , 1 tab(s) orally once a day (at bedtime)        Hospital Medications:  acetaminophen   Tablet .. 650 milliGRAM(s) Oral every 6 hours PRN  albuterol/ipratropium for Nebulization. 3 milliLiter(s) Nebulizer three times a day PRN  ALPRAZolam 0.25 milliGRAM(s) Oral three times a day PRN  amLODIPine   Tablet 10 milliGRAM(s) Oral daily  diphenhydrAMINE   Injectable 25 milliGRAM(s) IV Push every 4 hours PRN  enoxaparin Injectable 40 milliGRAM(s) SubCutaneous daily  levothyroxine 175 MICROGram(s) Oral daily  morphine  - Injectable 2 milliGRAM(s) IV Push every 4 hours PRN  ondansetron Injectable 4 milliGRAM(s) IV Push every 8 hours PRN  pantoprazole  Injectable 40 milliGRAM(s) IV Push daily  polyethylene glycol 3350 17 Gram(s) Oral two times a day  senna 2 Tablet(s) Oral at bedtime  simvastatin 20 milliGRAM(s) Oral at bedtime      PMHX/PSHX:  Acute pancreatitis  Obesity  Anxiety  Hyperlipidemia  Hypertension  Female breast neoplasm  Female breast neoplasm  Female breast neoplasm  Superficial Phlebitis  Superficial Phlebitis  H/O: hypothyroidism  H/O: hypothyroidism  S/P lumpectomy, left breast  S/P thyroid surgery  Hx of cholecystectomy      Family history:  Family history of coronary artery disease (Sibling)      Social History:     ROS:     General:  No weight loss, fevers, chills, night sweats, fatigue  Eyes:  No vision changes, no yellowing of eyes   ENT:  No throat pain, runny nose  CV:  No chest pain, palpitations  Resp:  No SOB, cough, wheezing  GI:  See HPI  :  No burning with urination, no hematuria   Muscle:  No muscle pain, weakness  Neuro:  No numbness/tingling, memory problems  Psych:  No fatigue, insomnia, mood problems  Heme:  No easy bruisability  Skin:  No rash, itching       PHYSICAL EXAM:     GENERAL:  Appears stated age, well-groomed, well-nourished, no distress  HEENT:  NC/AT,  conjunctivae clear and pink,  no JVD  CHEST:  Full & symmetric excursion, no increased effort, breath sounds clear  HEART:  Regular rhythm, S1, S2, no murmur/rub/S3/S4, no abdominal bruit, no edema  ABDOMEN:  Soft, non-tender, non-distended, normoactive bowel sounds,  no masses ,  EXTREMITIES:  no cyanosis,clubbing or edema  SKIN:  No rash/erythema/ecchymoses/petechiae/wounds/abscess/warm/dry  NEURO:  Alert, oriented    Vital Signs:  Vital Signs Last 24 Hrs  T(C): 36.7 (29 Nov 2019 11:50), Max: 36.8 (28 Nov 2019 21:03)  T(F): 98 (29 Nov 2019 11:50), Max: 98.3 (28 Nov 2019 21:03)  HR: 113 (29 Nov 2019 11:50) (108 - 118)  BP: 124/78 (29 Nov 2019 11:50) (124/78 - 137/83)  BP(mean): --  RR: 16 (29 Nov 2019 11:50) (16 - 18)  SpO2: 98% (29 Nov 2019 11:50) (96% - 98%)  Daily     Daily     LABS:                        14.1   12.01 )-----------( 351      ( 29 Nov 2019 05:10 )             42.7     11-29    136  |  98  |  9   ----------------------------<  93  3.5   |  24  |  0.62    Ca    9.1      29 Nov 2019 05:10  Phos  3.4     11-29  Mg     2.2     11-29    TPro  6.5  /  Alb  3.3  /  TBili  0.5  /  DBili  x   /  AST  163<H>  /  ALT  301<H>  /  AlkPhos  256<H>  11-29    LIVER FUNCTIONS - ( 29 Nov 2019 05:10 )  Alb: 3.3 g/dL / Pro: 6.5 g/dL / ALK PHOS: 256 u/L / ALT: 301 u/L / AST: 163 u/L / GGT: x           Imaging:      < from: CT Abdomen and Pelvis w/ IV Cont (11.23.19 @ 19:32) >  EXAM:  CT ABDOMEN AND PELVIS IC        PROCEDURE DATE:  Nov 23 2019         INTERPRETATION:  CLINICAL INFORMATION: Pain radiating to back concern for   pancreatitis and/or dissection     COMPARISON: None.    PROCEDURE:   CT of the Abdomen and Pelvis was performed with intravenous contrast.   Intravenous contrast: 90 ml Omnipaque 350. 10 ml discarded.  Oral contrast: None.  Sagittal and coronal reformats were performed.    FINDINGS:    LOWER CHEST: Within normal limits.    LIVER: Within normal limits.  BILE DUCTS: The common bile duct is slightly enlarged, this is likely a   combination of peripancreatic edema and the patient's postcholecystectomy   state.  GALLBLADDER: Cholecystectomy.  SPLEEN: Within normal limits.  PANCREAS: Diffuse haziness and loss of intralobular architecture of the   pancreas with slight prominence of the pancreatic duct in the body and   tail, findings concerning for acute interstitial edematous pancreatitis.   No evidence of focal no crisis. No venous thrombosisor pseudoaneurysm.   No discrete focal drainable collection.  ADRENALS: Within normal limits.  KIDNEYS/URETERS: Within normal limits.    BLADDER: Within normal limits.  REPRODUCTIVE ORGANS: Multi fibroid uterus measuring 9 x 7.3 x 8.4 cm. 4.2   x 4.0x 3.7 cm pedunculated fibroid in the left anterior lower uterine   segment. No adnexal masses.    BOWEL: No bowel obstruction. Appendix is normal. The colon is under   distended. There is no significant fecal load.  There is scattered diverticulosis of the left and sigmoid colon.  PERITONEUM: No ascites. There is diffuse peripancreatic soft tissue edema.  VESSELS: Atherosclerotic changes.  RETROPERITONEUM/LYMPH NODES: No lymphadenopathy.    ABDOMINAL WALL: Within normal limits.  BONES: Degenerative changes of the spine.    IMPRESSION:     Acute interstitial edematous pancreatitis. CTSI score = 2.    No CT evidence of pancreatic necrosis, or peripancreatic fluid collection   within the limits of this study.  No venous thrombosis or pseudoaneurysm.    Status post cholecystectomy.  Fibroid uterus.  No aortic dissection or aneurysm.

## 2019-11-29 NOTE — PROGRESS NOTE ADULT - PROBLEM SELECTOR PLAN 2
Patient with new onset transaminitis x3 days in the setting of acute pancreatitis. Patient s/p cholecystectomy after previous episode of pancreatitis 13 years ago.   -trend LFTs  -avoid hepatotoxic meds   -f/u hepatology consult Patient with new onset transaminitis x3 days in the setting of acute pancreatitis. Patient s/p cholecystectomy after previous episode of pancreatitis 13 years ago.   -trend LFTs  -avoid hepatotoxic meds   -f/u GI/hepatology consult

## 2019-11-29 NOTE — CONSULT NOTE ADULT - ASSESSMENT
Impression:    #Pancreatitis, recurrent: Admission lipase >3000 with CT evidence of pancreatitis, now improved with IV fluid hydration.   #Bile duct dilatation: Total bilirubin normal however ALK phos elevated. Rule out biliary sludge, bile stones or intraductal lesion.      Recommendations:  - can obtain further imaging with MRCP given bile duct dilatation in setting of elevated transaminases   - monitor transaminases and total bilirubin on daily CMP  - diet as tolerated by patient   - pain control per primary team       Belinda Del Toro PGY-4  Gastroenterology Fellow  Pager #[phone] or [phone]  Pager #[phone] 5pm-7am on weekdays, and on weekends Impression:    #Pancreatitis, recurrent: Admission lipase >3000 with CT evidence of pancreatitis, now improved with IV fluid hydration.   #Bile duct dilatation: Total bilirubin normal however ALK phos elevated. Rule out biliary sludge, bile stones or intraductal lesion as etiology of pancreatitis.      Recommendations:  - can obtain further imaging with MRCP given bile duct dilatation in setting of elevated transaminases   - monitor transaminases and total bilirubin on daily CMP  - diet as tolerated by patient   - pain control per primary team   - intravenous hydration with Ringers Lactate      Belinda Del Toro PGY-4  Gastroenterology Fellow  Pager #[phone] or [phone]  Pager #[phone] 5pm-7am on weekdays, and on weekends

## 2019-11-29 NOTE — PROGRESS NOTE ADULT - SUBJECTIVE AND OBJECTIVE BOX
Dieter Rodriguez MD PGY-1  [facility] [phone] [place] [id number]      DANY HENDERSON  59y  Female      Patient is a 59y old  Female who presents with a chief complaint of Acute Pancreatitis, Epigastric pain, + N/V, (28 Nov 2019 06:30)      INTERVAL HPI/OVERNIGHT EVENTS: No acute events O/N. Prelim CTA neg for PE       REVIEW OF SYSTEMS:  REVIEW OF SYSTEMS:  CONSTITUTIONAL: +abd pain   EYES: No eye pain,ENMT:  No difficulty hearing, tinnitus, vertigo; No sinus or throat pain  NECK: No pain or stiffness  RESPIRATORY: +intermittent wheeze   CARDIOVASCULAR: No chest pain, palpitations, dizziness, or leg swelling  GASTROINTESTINAL: +epigastric pain to L flank  GENITOURINARY: No dysuria, frequency, hematuria, or incontinence  NEUROLOGICAL: No headaches, memory loss, loss of strength, numbness, or tremors  SKIN: No itching, burning, rashes, or lesions   LYMPH NODES: No enlarged glands  ENDOCRINE: No heat or cold intolerance; No hair loss  MUSCULOSKELETAL: No joint pain or swelling; No muscle, back, or extremity pain  PSYCHIATRIC: No depression, anxiety, mood swings, or difficulty sleeping  HEME/LYMPH: No easy bruising, or bleeding gums  ALLERY AND IMMUNOLOGIC: No hives or eczema  FAMILY HISTORY:  Family history of coronary artery disease (Sibling)    T(C): 36.7 (11-29-19 @ 01:54), Max: 36.8 (11-28-19 @ 12:40)  HR: 110 (11-29-19 @ 01:54) (110 - 118)  BP: 137/83 (11-29-19 @ 01:54) (124/79 - 145/83)  RR: 18 (11-29-19 @ 01:54) (16 - 18)  SpO2: 96% (11-29-19 @ 01:54) (95% - 98%)  Wt(kg): --Vital Signs Last 24 Hrs  T(C): 36.7 (29 Nov 2019 01:54), Max: 36.8 (28 Nov 2019 12:40)  T(F): 98 (29 Nov 2019 01:54), Max: 98.3 (28 Nov 2019 21:03)  HR: 110 (29 Nov 2019 01:54) (110 - 118)  BP: 137/83 (29 Nov 2019 01:54) (124/79 - 145/83)  BP(mean): --  RR: 18 (29 Nov 2019 01:54) (16 - 18)  SpO2: 96% (29 Nov 2019 01:54) (95% - 98%)  codeine (Unknown)  morphine (Other)  oxycodone (Unknown)  pcn,morphine,dilaudid (Anaphylaxis; Rash)  tramadol (Unknown)      PHYSICAL EXAM:  GENERAL: NAD, well-groomed, well-developed  HEAD:  Atraumatic, Normocephalic  EYES: EOMI, PERRLA, conjunctiva and sclera clear  ENMT: No tonsillar erythema, exudates, or enlargement; Moist mucous membranes, Good dentition, No lesions  NECK: Supple, No JVD, Normal thyroid  NERVOUS SYSTEM:  Alert & Oriented X3, Good concentration; Motor Strength 5/5 B/L upper and lower extremities; DTRs 2+ intact and symmetric  CHEST/LUNG: lung CTA STEPHY  HEART: Regular rate and rhythm; No murmurs, rubs, or gallops  ABDOMEN: +TTP epigastrium  EXTREMITIES:  2+ Peripheral Pulses, trace LE STEPHY edema   LYMPH: No lymphadenopathy noted  SKIN: No rashes or lesions      Consultant(s) Notes Reviewed:  [x ] YES  [ ] NO  Care Discussed with Consultants/Other Providers [ x] YES  [ ] NO    LABS:      RADIOLOGY & ADDITIONAL TESTS:    Imaging Personally Reviewed:  [ ] YES  [ ] NO  acetaminophen   Tablet .. 650 milliGRAM(s) Oral every 6 hours PRN  albuterol/ipratropium for Nebulization. 3 milliLiter(s) Nebulizer three times a day PRN  ALPRAZolam 0.25 milliGRAM(s) Oral three times a day PRN  amLODIPine   Tablet 10 milliGRAM(s) Oral daily  diphenhydrAMINE   Injectable 25 milliGRAM(s) IV Push every 4 hours PRN  enoxaparin Injectable 40 milliGRAM(s) SubCutaneous daily  levothyroxine 175 MICROGram(s) Oral daily  morphine  - Injectable 2 milliGRAM(s) IV Push every 4 hours PRN  ondansetron Injectable 4 milliGRAM(s) IV Push every 8 hours PRN  pantoprazole  Injectable 40 milliGRAM(s) IV Push daily  polyethylene glycol 3350 17 Gram(s) Oral two times a day  senna 2 Tablet(s) Oral at bedtime  simvastatin 20 milliGRAM(s) Oral at bedtime      HEALTH ISSUES - PROBLEM Dx:  Transaminitis: Transaminitis  Shortness of breath: Shortness of breath  Wheezing: Wheezing  Constipation, acute: Constipation, acute  Discharge planning issues: Discharge planning issues  Preventive measure: Preventive measure  H/O: hypothyroidism: H/O: hypothyroidism  Anxiety: Anxiety  Hyperlipidemia: Hyperlipidemia  Hypertension: Hypertension  Acute pancreatitis: Acute pancreatitis Dieter Rodriguez MD PGY-1  [facility] [phone] II [facility] [address]      DANY HENDERSON  59y  Female      Patient is a 59y old  Female who presents with a chief complaint of Acute Pancreatitis, Epigastric pain, + N/V, (28 Nov 2019 06:30)      INTERVAL HPI/OVERNIGHT EVENTS: No acute events O/N. Prelim CTA neg for PE       REVIEW OF SYSTEMS:  CONSTITUTIONAL: +abd pain   EYES: No eye pain,  ENMT: No sinus or throat pain  NECK: No pain or stiffness  RESPIRATORY: +intermittent wheeze, +SOB when reclining, improved    CARDIOVASCULAR: No chest pain, palpitations, dizziness, or leg swelling  GASTROINTESTINAL: +epigastric pain to L flank  GENITOURINARY: No dysuria, frequency, hematuria, or incontinence  NEUROLOGICAL: No headaches, memory loss, loss of strength, numbness, or tremors  SKIN: No itching, burning, rashes, or lesions   LYMPH NODES: No enlarged glands  ENDOCRINE: No heat or cold intolerance; No hair loss  MUSCULOSKELETAL: No joint pain or swelling; No muscle, back, or extremity pain  PSYCHIATRIC: No depression, anxiety, mood swings, or difficulty sleeping  HEME/LYMPH: No easy bruising, or bleeding gums  ALLERY AND IMMUNOLOGIC: No hives or eczema  FAMILY HISTORY:  Family history of coronary artery disease (Sibling)    T(C): 36.7 (11-29-19 @ 01:54), Max: 36.8 (11-28-19 @ 12:40)  HR: 110 (11-29-19 @ 01:54) (110 - 118)  BP: 137/83 (11-29-19 @ 01:54) (124/79 - 145/83)  RR: 18 (11-29-19 @ 01:54) (16 - 18)  SpO2: 96% (11-29-19 @ 01:54) (95% - 98%)  Wt(kg): --Vital Signs Last 24 Hrs  T(C): 36.7 (29 Nov 2019 01:54), Max: 36.8 (28 Nov 2019 12:40)  T(F): 98 (29 Nov 2019 01:54), Max: 98.3 (28 Nov 2019 21:03)  HR: 110 (29 Nov 2019 01:54) (110 - 118)  BP: 137/83 (29 Nov 2019 01:54) (124/79 - 145/83)  BP(mean): --  RR: 18 (29 Nov 2019 01:54) (16 - 18)  SpO2: 96% (29 Nov 2019 01:54) (95% - 98%)  codeine (Unknown)  morphine (Other)  oxycodone (Unknown)  pcn,morphine,dilaudid (Anaphylaxis; Rash)  tramadol (Unknown)      PHYSICAL EXAM:  GENERAL: NAD, well-groomed, well-developed  HEAD:  Atraumatic, Normocephalic  EYES: EOMI, PERRLA, conjunctiva and sclera clear  ENMT: No tonsillar erythema, exudates, or enlargement; Moist mucous membranes, Good dentition, No lesions  NECK: Supple, No JVD, Normal thyroid  NERVOUS SYSTEM:  Alert & Oriented X3, Good concentration; Motor Strength 5/5 B/L upper and lower extremities  CHEST/LUNG: lung CTA STEPHY  HEART: Regular rate and rhythm; No murmurs, rubs, or gallops  ABDOMEN: +TTP epigastrium  EXTREMITIES:  2+ Peripheral Pulses, trace LE STEPHY edema   LYMPH: No lymphadenopathy noted  SKIN: No rashes or lesions      Consultant(s) Notes Reviewed:  [x ] YES  [ ] NO  Care Discussed with Consultants/Other Providers [ x] YES  [ ] NO    LABS:      RADIOLOGY & ADDITIONAL TESTS:    CTA negative for PE     Imaging Personally Reviewed:  [ ] YES  [ ] NO  acetaminophen   Tablet .. 650 milliGRAM(s) Oral every 6 hours PRN  albuterol/ipratropium for Nebulization. 3 milliLiter(s) Nebulizer three times a day PRN  ALPRAZolam 0.25 milliGRAM(s) Oral three times a day PRN  amLODIPine   Tablet 10 milliGRAM(s) Oral daily  diphenhydrAMINE   Injectable 25 milliGRAM(s) IV Push every 4 hours PRN  enoxaparin Injectable 40 milliGRAM(s) SubCutaneous daily  levothyroxine 175 MICROGram(s) Oral daily  morphine  - Injectable 2 milliGRAM(s) IV Push every 4 hours PRN  ondansetron Injectable 4 milliGRAM(s) IV Push every 8 hours PRN  pantoprazole  Injectable 40 milliGRAM(s) IV Push daily  polyethylene glycol 3350 17 Gram(s) Oral two times a day  senna 2 Tablet(s) Oral at bedtime  simvastatin 20 milliGRAM(s) Oral at bedtime      HEALTH ISSUES - PROBLEM Dx:  Transaminitis: Transaminitis  Shortness of breath: Shortness of breath  Wheezing: Wheezing  Constipation, acute: Constipation, acute  Discharge planning issues: Discharge planning issues  Preventive measure: Preventive measure  H/O: hypothyroidism: H/O: hypothyroidism  Anxiety: Anxiety  Hyperlipidemia: Hyperlipidemia  Hypertension: Hypertension  Acute pancreatitis: Acute pancreatitis

## 2019-11-29 NOTE — PROGRESS NOTE ADULT - PROBLEM SELECTOR PLAN 3
Patient w complaints of wheezing and SOB, worse when supine, with newly noted LLL opacity on CXR c/w effusion  -intermittent wheeze responsive to duonebs   -patient denies hx RAD  -s/p lasix 20mg x3  -continue to observe for signs of infxn  -consider tx for PNA if febrile, uptrending WBC Patient w complaints of wheezing and SOB, worse when supine, with newly noted LLL opacity on CXR c/w effusion  -CTA negative for PE, + STEPHY small pleural effusion.   -s/p lasix 20mg x3  -continue to observe for signs of infxn  -consider tx for PNA if febrile, uptrending WBC

## 2019-11-30 LAB
ALBUMIN SERPL ELPH-MCNC: 3.1 G/DL — LOW (ref 3.3–5)
ALP SERPL-CCNC: 232 U/L — HIGH (ref 40–120)
ALT FLD-CCNC: 223 U/L — HIGH (ref 4–33)
ANION GAP SERPL CALC-SCNC: 17 MMO/L — HIGH (ref 7–14)
AST SERPL-CCNC: 64 U/L — HIGH (ref 4–32)
BILIRUB SERPL-MCNC: 0.5 MG/DL — SIGNIFICANT CHANGE UP (ref 0.2–1.2)
BUN SERPL-MCNC: 10 MG/DL — SIGNIFICANT CHANGE UP (ref 7–23)
CALCIUM SERPL-MCNC: 9 MG/DL — SIGNIFICANT CHANGE UP (ref 8.4–10.5)
CHLORIDE SERPL-SCNC: 97 MMOL/L — LOW (ref 98–107)
CO2 SERPL-SCNC: 22 MMOL/L — SIGNIFICANT CHANGE UP (ref 22–31)
CREAT SERPL-MCNC: 0.64 MG/DL — SIGNIFICANT CHANGE UP (ref 0.5–1.3)
GLUCOSE SERPL-MCNC: 108 MG/DL — HIGH (ref 70–99)
HCT VFR BLD CALC: 43.4 % — SIGNIFICANT CHANGE UP (ref 34.5–45)
HGB BLD-MCNC: 14.8 G/DL — SIGNIFICANT CHANGE UP (ref 11.5–15.5)
MAGNESIUM SERPL-MCNC: 2.2 MG/DL — SIGNIFICANT CHANGE UP (ref 1.6–2.6)
MCHC RBC-ENTMCNC: 28.8 PG — SIGNIFICANT CHANGE UP (ref 27–34)
MCHC RBC-ENTMCNC: 34.1 % — SIGNIFICANT CHANGE UP (ref 32–36)
MCV RBC AUTO: 84.4 FL — SIGNIFICANT CHANGE UP (ref 80–100)
NRBC # FLD: 0 K/UL — SIGNIFICANT CHANGE UP (ref 0–0)
PHOSPHATE SERPL-MCNC: 3.4 MG/DL — SIGNIFICANT CHANGE UP (ref 2.5–4.5)
PLATELET # BLD AUTO: 346 K/UL — SIGNIFICANT CHANGE UP (ref 150–400)
PMV BLD: 8.8 FL — SIGNIFICANT CHANGE UP (ref 7–13)
POTASSIUM SERPL-MCNC: 3.5 MMOL/L — SIGNIFICANT CHANGE UP (ref 3.5–5.3)
POTASSIUM SERPL-SCNC: 3.5 MMOL/L — SIGNIFICANT CHANGE UP (ref 3.5–5.3)
PROT SERPL-MCNC: 6.7 G/DL — SIGNIFICANT CHANGE UP (ref 6–8.3)
RBC # BLD: 5.14 M/UL — SIGNIFICANT CHANGE UP (ref 3.8–5.2)
RBC # FLD: 13.8 % — SIGNIFICANT CHANGE UP (ref 10.3–14.5)
SODIUM SERPL-SCNC: 136 MMOL/L — SIGNIFICANT CHANGE UP (ref 135–145)
WBC # BLD: 9.68 K/UL — SIGNIFICANT CHANGE UP (ref 3.8–10.5)
WBC # FLD AUTO: 9.68 K/UL — SIGNIFICANT CHANGE UP (ref 3.8–10.5)

## 2019-11-30 PROCEDURE — 74183 MRI ABD W/O CNTR FLWD CNTR: CPT | Mod: 26

## 2019-11-30 PROCEDURE — 99232 SBSQ HOSP IP/OBS MODERATE 35: CPT | Mod: GC

## 2019-11-30 RX ADMIN — Medication 0.25 MILLIGRAM(S): at 00:16

## 2019-11-30 RX ADMIN — AMLODIPINE BESYLATE 10 MILLIGRAM(S): 2.5 TABLET ORAL at 05:07

## 2019-11-30 RX ADMIN — MORPHINE SULFATE 2 MILLIGRAM(S): 50 CAPSULE, EXTENDED RELEASE ORAL at 20:29

## 2019-11-30 RX ADMIN — MORPHINE SULFATE 2 MILLIGRAM(S): 50 CAPSULE, EXTENDED RELEASE ORAL at 20:19

## 2019-11-30 RX ADMIN — Medication 25 MILLIGRAM(S): at 20:16

## 2019-11-30 RX ADMIN — MORPHINE SULFATE 2 MILLIGRAM(S): 50 CAPSULE, EXTENDED RELEASE ORAL at 05:21

## 2019-11-30 RX ADMIN — ENOXAPARIN SODIUM 40 MILLIGRAM(S): 100 INJECTION SUBCUTANEOUS at 11:34

## 2019-11-30 RX ADMIN — PANTOPRAZOLE SODIUM 40 MILLIGRAM(S): 20 TABLET, DELAYED RELEASE ORAL at 11:34

## 2019-11-30 RX ADMIN — MORPHINE SULFATE 2 MILLIGRAM(S): 50 CAPSULE, EXTENDED RELEASE ORAL at 13:50

## 2019-11-30 RX ADMIN — Medication 25 MILLIGRAM(S): at 12:54

## 2019-11-30 RX ADMIN — MORPHINE SULFATE 2 MILLIGRAM(S): 50 CAPSULE, EXTENDED RELEASE ORAL at 12:56

## 2019-11-30 RX ADMIN — Medication 175 MICROGRAM(S): at 05:07

## 2019-11-30 RX ADMIN — Medication 25 MILLIGRAM(S): at 05:06

## 2019-11-30 RX ADMIN — MORPHINE SULFATE 2 MILLIGRAM(S): 50 CAPSULE, EXTENDED RELEASE ORAL at 05:06

## 2019-11-30 RX ADMIN — POLYETHYLENE GLYCOL 3350 17 GRAM(S): 17 POWDER, FOR SOLUTION ORAL at 17:55

## 2019-11-30 RX ADMIN — SIMVASTATIN 20 MILLIGRAM(S): 20 TABLET, FILM COATED ORAL at 21:34

## 2019-11-30 NOTE — PROVIDER CONTACT NOTE (OTHER) - ACTION/TREATMENT ORDERED:
MD notified. 2L NC applied. EKG will be ordered. Will continue to monitor V/S and respiratory status.
MD notified. PRN Xanax will be given. Will continue to monitor V/S.
continue to monitor
Recheck BP at 12am: temp was 99.2@ 12am and MD had no further intervention

## 2019-11-30 NOTE — PROGRESS NOTE ADULT - PROBLEM SELECTOR PLAN 1
Patient p/w acute idiopathic pancreatitis with hx of similar episode 13 years ago, s/p cholecystectomy after initial episode, with improvement of pain and symptoms.   -ongoing transaminitis   -pain control with IV Morphine and IV Benadryl PRN for now  -IV Zofran PRN  -IV Protonix,   -advancing diet as tolerated  -RUQ demonstrates pancreatic duct dilatation, no obstruction   -f/u IgG levels  -triglyceride levels WNL  -f/u MRCP  -GI following, recs appreciated

## 2019-11-30 NOTE — PROGRESS NOTE ADULT - PROBLEM SELECTOR PLAN 6
Patient reports multiple bowel movements and improvement of constipation   -daily senna  -daily miralax  -will try dulcolax suppository   -judicious use of opioid pain medications

## 2019-11-30 NOTE — PROVIDER CONTACT NOTE (OTHER) - RECOMMENDATIONS
Notify MD. EKG. Oxygen for comfort.
Notify MD. Give PRN medication for pain or anxiety.
continue to monitor
Continue to monitor pt

## 2019-11-30 NOTE — PROGRESS NOTE ADULT - PROBLEM SELECTOR PLAN 2
Patient with new onset transaminitis x3 days in the setting of acute pancreatitis. Patient s/p cholecystectomy after previous episode of pancreatitis 13 years ago.   -trend LFTs  -avoid hepatotoxic meds   -f/u GI/hepatology consult

## 2019-11-30 NOTE — PROGRESS NOTE ADULT - SUBJECTIVE AND OBJECTIVE BOX
Dieter Rodriguez MD PGY-1  [facility] [phone] [place] [id number]      DANY HENDERSON  59y  Female      Patient is a 59y old  Female who presents with a chief complaint of Acute Pancreatitis, Epigastric pain, + N/V, (29 Nov 2019 16:36)      INTERVAL HPI/OVERNIGHT EVENTS: No overnight events.       REVIEW OF SYSTEMS:  CONSTITUTIONAL: +abd pain, +SOB, improved   EYES: No eye pain, visual disturbances, or discharge  ENMT:  No difficulty hearing, tinnitus, vertigo; No sinus or throat pain  NECK: No pain or stiffness  RESPIRATORY: +mild sob, improved, worse when lying down   CARDIOVASCULAR: No chest pain, palpitations, dizziness, or leg swelling  GASTROINTESTINAL: +epigastric pain radiating to L flank   GENITOURINARY: No dysuria, frequency  NEUROLOGICAL: No headaches, memory loss, loss of strength, numbness, or tremors  SKIN: No itching, burning, rashes, or lesions   LYMPH NODES: No enlarged glands  ENDOCRINE: No heat or cold intolerance; No hair loss  MUSCULOSKELETAL: No joint pain or swelling;   PSYCHIATRIC: No depression, anxiety, mood swings, or difficulty sleeping  HEME/LYMPH: No easy bruising, or bleeding gums  ALLERY AND IMMUNOLOGIC: No hives or eczema  FAMILY HISTORY:  Family history of coronary artery disease (Sibling)    T(C): 36.5 (11-30-19 @ 05:04), Max: 37.1 (11-29-19 @ 21:00)  HR: 109 (11-30-19 @ 05:04) (104 - 114)  BP: 142/95 (11-30-19 @ 05:04) (124/78 - 146/87)  RR: 18 (11-30-19 @ 05:04) (16 - 19)  SpO2: 95% (11-30-19 @ 05:04) (95% - 100%)  Wt(kg): --Vital Signs Last 24 Hrs  T(C): 36.5 (30 Nov 2019 05:04), Max: 37.1 (29 Nov 2019 21:00)  T(F): 97.7 (30 Nov 2019 05:04), Max: 98.7 (29 Nov 2019 21:00)  HR: 109 (30 Nov 2019 05:04) (104 - 114)  BP: 142/95 (30 Nov 2019 05:04) (124/78 - 146/87)  BP(mean): --  RR: 18 (30 Nov 2019 05:04) (16 - 19)  SpO2: 95% (30 Nov 2019 05:04) (95% - 100%)  codeine (Unknown)  morphine (Other)  oxycodone (Unknown)  pcn,morphine,dilaudid (Anaphylaxis; Rash)  tramadol (Unknown)      PHYSICAL EXAM:  GENERAL: NAD, well-groomed, well-developed  HEAD:  Atraumatic, Normocephalic  EYES: EOMI, PERRLA, conjunctiva and sclera clear  ENMT: No tonsillar erythema, exudates, or enlargement; Moist mucous membranes, Good dentition, No lesions  NECK: Supple, No JVD, Normal thyroid  NERVOUS SYSTEM:  Alert & Oriented X3, Good concentration; Motor Strength 5/5 B/L upper and lower extremities; DTRs 2+ intact and symmetric  CHEST/LUNG: CTA STEPHY  HEART: Regular rate and rhythm; No murmurs, rubs, or gallops  ABDOMEN: +mild TTP epigastrium, L flank   EXTREMITIES:  2+ Peripheral Pulses, No clubbing, cyanosis, or edema  LYMPH: No lymphadenopathy noted  SKIN: No rashes or lesions    Consultant(s) Notes Reviewed:  [x ] YES  [ ] NO  Care Discussed with Consultants/Other Providers [ x] YES  [ ] NO    LABS:      RADIOLOGY & ADDITIONAL TESTS:    Imaging Personally Reviewed:  [ ] YES  [ ] NO  acetaminophen   Tablet .. 650 milliGRAM(s) Oral every 6 hours PRN  albuterol/ipratropium for Nebulization. 3 milliLiter(s) Nebulizer three times a day PRN  ALPRAZolam 0.25 milliGRAM(s) Oral three times a day PRN  amLODIPine   Tablet 10 milliGRAM(s) Oral daily  diphenhydrAMINE   Injectable 25 milliGRAM(s) IV Push every 4 hours PRN  enoxaparin Injectable 40 milliGRAM(s) SubCutaneous daily  levothyroxine 175 MICROGram(s) Oral daily  morphine  - Injectable 2 milliGRAM(s) IV Push every 4 hours PRN  ondansetron Injectable 4 milliGRAM(s) IV Push every 8 hours PRN  pantoprazole  Injectable 40 milliGRAM(s) IV Push daily  polyethylene glycol 3350 17 Gram(s) Oral two times a day  senna 2 Tablet(s) Oral at bedtime  simvastatin 20 milliGRAM(s) Oral at bedtime      HEALTH ISSUES - PROBLEM Dx:  Transaminitis: Transaminitis  Shortness of breath: Shortness of breath  Wheezing: Wheezing  Constipation, acute: Constipation, acute  Discharge planning issues: Discharge planning issues  Preventive measure: Preventive measure  H/O: hypothyroidism: H/O: hypothyroidism  Anxiety: Anxiety  Hyperlipidemia: Hyperlipidemia  Hypertension: Hypertension  Acute pancreatitis: Acute pancreatitis

## 2019-11-30 NOTE — PROGRESS NOTE ADULT - PROBLEM SELECTOR PLAN 3
Patient w complaints of wheezing and SOB, worse when supine, with newly noted LLL opacity on CXR c/w effusion  -CTA negative for PE, + STEPHY small pleural effusion.   -s/p lasix 20mg x3  -continue to observe for signs of infxn  -consider tx for PNA if febrile, uptrending WBC

## 2019-11-30 NOTE — PROVIDER CONTACT NOTE (OTHER) - BACKGROUND
Admitted for pancreatitis
Admitted for pancreatitis. Hx of obesity and HTN, cholecystectomy
pt admitted with chronic pancreatitis. Hx of obesity, anxiety, htn, cholesterol, hypothyroid
Pt. admitted with acute epigastric pain radiating to back with N/V found to have acute pancreatitis.

## 2019-11-30 NOTE — PROVIDER CONTACT NOTE (OTHER) - ASSESSMENT
Pt. is c/o SOB. Visibly dyspneic. ; /67; RR 22; Oxygen 95%
Pt. reports restlessness and insomnia. SANCHEZ noted. Denies chest pain. /76, , Oxygen 94%, RR 21
monitor vs and changes in mental status
Pt temp reading was 100.5

## 2019-12-01 ENCOUNTER — TRANSCRIPTION ENCOUNTER (OUTPATIENT)
Age: 59
End: 2019-12-01

## 2019-12-01 VITALS
RESPIRATION RATE: 17 BRPM | SYSTOLIC BLOOD PRESSURE: 120 MMHG | DIASTOLIC BLOOD PRESSURE: 78 MMHG | OXYGEN SATURATION: 96 % | HEART RATE: 94 BPM

## 2019-12-01 LAB
ALBUMIN SERPL ELPH-MCNC: 3.1 G/DL — LOW (ref 3.3–5)
ALP SERPL-CCNC: 194 U/L — HIGH (ref 40–120)
ALT FLD-CCNC: 165 U/L — HIGH (ref 4–33)
ANION GAP SERPL CALC-SCNC: 12 MMO/L — SIGNIFICANT CHANGE UP (ref 7–14)
AST SERPL-CCNC: 45 U/L — HIGH (ref 4–32)
BILIRUB SERPL-MCNC: 0.3 MG/DL — SIGNIFICANT CHANGE UP (ref 0.2–1.2)
BUN SERPL-MCNC: 13 MG/DL — SIGNIFICANT CHANGE UP (ref 7–23)
CALCIUM SERPL-MCNC: 9 MG/DL — SIGNIFICANT CHANGE UP (ref 8.4–10.5)
CHLORIDE SERPL-SCNC: 99 MMOL/L — SIGNIFICANT CHANGE UP (ref 98–107)
CO2 SERPL-SCNC: 25 MMOL/L — SIGNIFICANT CHANGE UP (ref 22–31)
CREAT SERPL-MCNC: 0.75 MG/DL — SIGNIFICANT CHANGE UP (ref 0.5–1.3)
GLUCOSE SERPL-MCNC: 114 MG/DL — HIGH (ref 70–99)
HCT VFR BLD CALC: 41.2 % — SIGNIFICANT CHANGE UP (ref 34.5–45)
HGB BLD-MCNC: 13.8 G/DL — SIGNIFICANT CHANGE UP (ref 11.5–15.5)
MAGNESIUM SERPL-MCNC: 2.2 MG/DL — SIGNIFICANT CHANGE UP (ref 1.6–2.6)
MCHC RBC-ENTMCNC: 28.4 PG — SIGNIFICANT CHANGE UP (ref 27–34)
MCHC RBC-ENTMCNC: 33.5 % — SIGNIFICANT CHANGE UP (ref 32–36)
MCV RBC AUTO: 84.8 FL — SIGNIFICANT CHANGE UP (ref 80–100)
NRBC # FLD: 0 K/UL — SIGNIFICANT CHANGE UP (ref 0–0)
PHOSPHATE SERPL-MCNC: 3.3 MG/DL — SIGNIFICANT CHANGE UP (ref 2.5–4.5)
PLATELET # BLD AUTO: 352 K/UL — SIGNIFICANT CHANGE UP (ref 150–400)
PMV BLD: 8.8 FL — SIGNIFICANT CHANGE UP (ref 7–13)
POTASSIUM SERPL-MCNC: 3.5 MMOL/L — SIGNIFICANT CHANGE UP (ref 3.5–5.3)
POTASSIUM SERPL-SCNC: 3.5 MMOL/L — SIGNIFICANT CHANGE UP (ref 3.5–5.3)
PROT SERPL-MCNC: 6.4 G/DL — SIGNIFICANT CHANGE UP (ref 6–8.3)
RBC # BLD: 4.86 M/UL — SIGNIFICANT CHANGE UP (ref 3.8–5.2)
RBC # FLD: 14 % — SIGNIFICANT CHANGE UP (ref 10.3–14.5)
SODIUM SERPL-SCNC: 136 MMOL/L — SIGNIFICANT CHANGE UP (ref 135–145)
WBC # BLD: 8.79 K/UL — SIGNIFICANT CHANGE UP (ref 3.8–10.5)
WBC # FLD AUTO: 8.79 K/UL — SIGNIFICANT CHANGE UP (ref 3.8–10.5)

## 2019-12-01 PROCEDURE — 99239 HOSP IP/OBS DSCHRG MGMT >30: CPT | Mod: GC

## 2019-12-01 RX ADMIN — Medication 0.25 MILLIGRAM(S): at 00:27

## 2019-12-01 RX ADMIN — MORPHINE SULFATE 2 MILLIGRAM(S): 50 CAPSULE, EXTENDED RELEASE ORAL at 01:13

## 2019-12-01 RX ADMIN — MORPHINE SULFATE 2 MILLIGRAM(S): 50 CAPSULE, EXTENDED RELEASE ORAL at 06:31

## 2019-12-01 RX ADMIN — Medication 25 MILLIGRAM(S): at 06:14

## 2019-12-01 RX ADMIN — MORPHINE SULFATE 2 MILLIGRAM(S): 50 CAPSULE, EXTENDED RELEASE ORAL at 01:21

## 2019-12-01 RX ADMIN — Medication 175 MICROGRAM(S): at 06:19

## 2019-12-01 RX ADMIN — AMLODIPINE BESYLATE 10 MILLIGRAM(S): 2.5 TABLET ORAL at 06:19

## 2019-12-01 RX ADMIN — Medication 25 MILLIGRAM(S): at 01:10

## 2019-12-01 RX ADMIN — MORPHINE SULFATE 2 MILLIGRAM(S): 50 CAPSULE, EXTENDED RELEASE ORAL at 06:16

## 2019-12-01 NOTE — PROGRESS NOTE ADULT - REASON FOR ADMISSION
Acute Pancreatitis, Epigastric pain, + N/V,

## 2019-12-01 NOTE — PROGRESS NOTE ADULT - ATTENDING COMMENTS
59F hx of idiopathic pancreatitis (denies ETOH), cholecystectomy, HTN, HLD, hypothyroid on synthroid S/P thyroid Surgery for Benign Thyroid nodules , Obesity, Anxiety, presented with acute pancreatitis  --tolerated some PO last night, today nauseous after manipulation for US  --IVF, advance diet as tolerated
Acute pancreatitis of unclear etiology, check abdominal US for gallstones/sludge. No recent new medications. Triglyceride levels normal, patient denies EtOH use. C/w IVF, pain control, advance diet as tolerated.
59F hx of idiopathic pancreatitis (denies ETOH), cholecystectomy, HTN, HLD, hypothyroid on synthroid S/P thyroid Surgery for Benign Thyroid nodules , Obesity, Anxiety, presented with acute pancreatitis  --some improvement in pain  --tolerating some PO  --lasix trial for wheezing possibly volume overload; f/u CXR  --advance diet as tolerate
59F hx of idiopathic pancreatitis (denies ETOH), cholecystectomy, HTN, HLD, hypothyroid on synthroid S/P thyroid Surgery for Benign Thyroid nodules , Obesity, Anxiety, presented with acute pancreatitis  --some improvement in pain  --tolerating PO  --CTA chest neg for PE  --transaminases improving, f/u MRCP and hepatology recs
59F hx of idiopathic pancreatitis (denies ETOH), cholecystectomy, HTN, HLD, hypothyroid on synthroid S/P thyroid Surgery for Benign Thyroid nodules , Obesity, Anxiety, presented with acute pancreatitis  --tolerating PO, pain improved  --CTA chest neg for PE  --MRCP shows necotizing pancreatitis; discussed with hepatology, stable for d/c home  d/c time 40 min coordinating care
59F hx of idiopathic pancreatitis (denies ETOH), cholecystectomy, HTN, HLD, hypothyroid on synthroid S/P thyroid Surgery for Benign Thyroid nodules , Obesity, Anxiety, presented with acute pancreatitis  --some improvement in pain  --tolerating PO  --CTA chest neg for PE  --advance diet as tolerated  --transaminases increasing, appreciate hepatology recs, for MRCP
59F hx of idiopathic pancreatitis (denies ETOH), cholecystectomy, HTN, HLD, hypothyroid on synthroid S/P thyroid Surgery for Benign Thyroid nodules , Obesity, Anxiety, presented with acute pancreatitis  --continued pain overnight  --tolerating some PO  --IVF, advance diet as tolerated
59F hx of idiopathic pancreatitis (denies ETOH), cholecystectomy, HTN, HLD, hypothyroid on synthroid S/P thyroid Surgery for Benign Thyroid nodules , Obesity, Anxiety, presented with acute pancreatitis  --some improvement in pain  --tolerating some PO  --continued pulm sx with tachycardia, would r/o PE with CTA chest  --advance diet as tolerated  --transaminases increasing, would consult hepatology

## 2019-12-01 NOTE — PROGRESS NOTE ADULT - PROBLEM SELECTOR PLAN 1
Patient p/w acute idiopathic pancreatitis with hx of similar episode 13 years ago, s/p cholecystectomy after initial episode, with improvement of pain and symptoms.   -S/P MRCP which shows hemorrhagic pancreatitis, no drainable fluid collection, possible splenic vein thrombus- f/u GI reccs  -tolerating full diet, pain controlled   -Discharge planning

## 2019-12-01 NOTE — PROGRESS NOTE ADULT - NSHPATTENDINGPLANDISCUSS_GEN_ALL_CORE
Dr Cooper, patient and family at bedside
Dr Cooper, patient and family at bedside
Dr Cooper, patient
Dr Naik, patient
Dr Cooper, patient and family at bedside
Dr Cooper, patient
Dr Cooper, patient and family at bedside

## 2019-12-01 NOTE — PROGRESS NOTE ADULT - SUBJECTIVE AND OBJECTIVE BOX
Patient is a 59y old  Female who presents with a chief complaint of Acute Pancreatitis, Epigastric pain, + N/V, (30 Nov 2019 06:45)      SUBJECTIVE / OVERNIGHT EVENTS: Patient seen and examined at bedside. Vital signs stable overnight. Patient denies headache, dizziness, chest/abd pain, worsening shortness of breath. ROS negative unless otherwise stated.     MEDICATIONS  (STANDING):  amLODIPine   Tablet 10 milliGRAM(s) Oral daily  enoxaparin Injectable 40 milliGRAM(s) SubCutaneous daily  levothyroxine 175 MICROGram(s) Oral daily  pantoprazole  Injectable 40 milliGRAM(s) IV Push daily  polyethylene glycol 3350 17 Gram(s) Oral two times a day  senna 2 Tablet(s) Oral at bedtime  simvastatin 20 milliGRAM(s) Oral at bedtime    MEDICATIONS  (PRN):  acetaminophen   Tablet .. 650 milliGRAM(s) Oral every 6 hours PRN Mild Pain (1 - 3)  albuterol/ipratropium for Nebulization. 3 milliLiter(s) Nebulizer three times a day PRN Wheezing  ALPRAZolam 0.25 milliGRAM(s) Oral three times a day PRN Anxiety  diphenhydrAMINE   Injectable 25 milliGRAM(s) IV Push every 4 hours PRN Allergy symptoms  morphine  - Injectable 2 milliGRAM(s) IV Push every 4 hours PRN Severe Pain (7 - 10)  ondansetron Injectable 4 milliGRAM(s) IV Push every 8 hours PRN Nausea and/or Vomiting      T(C): 36.8 (12-01-19 @ 05:00), Max: 36.8 (11-30-19 @ 10:10)  HR: 94 (12-01-19 @ 06:31) (92 - 111)  BP: 120/78 (12-01-19 @ 06:31) (109/60 - 141/79)  RR: 17 (12-01-19 @ 06:31) (16 - 19)  SpO2: 96% (12-01-19 @ 06:31) (95% - 100%)  CAPILLARY BLOOD GLUCOSE        I&O's Summary    30 Nov 2019 07:01  -  01 Dec 2019 07:00  --------------------------------------------------------  IN: 275 mL / OUT: 740 mL / NET: -465 mL        PHYSICAL EXAM:  GENERAL: NAD, well-developed  HEAD:  Atraumatic, Normocephalic  EYES: EOMI, PERRLA, conjunctiva and sclera clear  NECK: Supple, No JVD  CHEST/LUNG: Clear to auscultation bilaterally; No wheeze  HEART: Regular rate and rhythm; No murmurs, rubs, or gallops  ABDOMEN: Soft, Nontender, Nondistended; Bowel sounds present  EXTREMITIES:  2+ Peripheral Pulses, No clubbing, cyanosis, or edema  PSYCH: AAOx3  NEUROLOGY: non-focal  SKIN: No rashes or lesions    LABS:  (12-01 @ 05:10)                        13.8  8.79 )-----------( 352                 41.2    Neutrophils = -- (--%)  Lymphocytes = -- (--%)  Eosinophils = -- (--%)  Basophils = -- (--%)  Monocytes = -- (--%)  Bands = --%    WBC Trend: 8.79<--, 9.68<--, 12.01<--  Hb Trend: 13.8<--, 14.8<--, 14.1<--, 13.3<--, 13.2<--  Plt Trend: 352<--, 346<--, 351<--, 328<--, 279<--  12-01    136  |  99  |  13  ----------------------------<  114<H>  3.5   |  25  |  0.75    Ca    9.0      01 Dec 2019 05:10  Phos  3.3     12-01  Mg     2.2     12-01    TPro  6.4  /  Alb  3.1<L>  /  TBili  0.3  /  DBili  x   /  AST  45<H>  /  ALT  165<H>  /  AlkPhos  194<H>  12-01    Creatinine Trend: 0.75<--, 0.64<--, 0.62<--, 0.63<--, 0.64<--, 0.70<--        Microbiology:  None    RADIOLOGY & ADDITIONAL TESTS:  [x ] imaging personally reviewed and interpreted by me

## 2019-12-01 NOTE — DISCHARGE NOTE NURSING/CASE MANAGEMENT/SOCIAL WORK - PATIENT PORTAL LINK FT
You can access the FollowMyHealth Patient Portal offered by [facility] by registering at the following website: [email or web address]. By joining [facility] FollowMyHealth portal, you will also be able to view your health information using other applications (apps) compatible with our system.

## 2019-12-02 ENCOUNTER — APPOINTMENT (OUTPATIENT)
Dept: INTERNAL MEDICINE | Facility: CLINIC | Age: 59
End: 2019-12-02
Payer: COMMERCIAL

## 2019-12-02 ENCOUNTER — LABORATORY RESULT (OUTPATIENT)
Age: 59
End: 2019-12-02

## 2019-12-02 VITALS — SYSTOLIC BLOOD PRESSURE: 100 MMHG | DIASTOLIC BLOOD PRESSURE: 70 MMHG

## 2019-12-02 VITALS
BODY MASS INDEX: 42.51 KG/M2 | WEIGHT: 249 LBS | HEIGHT: 64 IN | DIASTOLIC BLOOD PRESSURE: 70 MMHG | SYSTOLIC BLOOD PRESSURE: 100 MMHG

## 2019-12-02 PROBLEM — K85.90 ACUTE PANCREATITIS WITHOUT NECROSIS OR INFECTION, UNSPECIFIED: Chronic | Status: ACTIVE | Noted: 2019-11-24

## 2019-12-02 PROBLEM — F41.9 ANXIETY DISORDER, UNSPECIFIED: Chronic | Status: ACTIVE | Noted: 2019-11-24

## 2019-12-02 PROBLEM — E66.9 OBESITY, UNSPECIFIED: Chronic | Status: ACTIVE | Noted: 2019-11-24

## 2019-12-02 PROCEDURE — 99214 OFFICE O/P EST MOD 30 MIN: CPT | Mod: 25

## 2019-12-02 PROCEDURE — 36415 COLL VENOUS BLD VENIPUNCTURE: CPT

## 2019-12-02 RX ORDER — ALPRAZOLAM 0.25 MG/1
0.25 TABLET ORAL 3 TIMES DAILY
Qty: 90 | Refills: 0 | Status: DISCONTINUED | COMMUNITY
Start: 2017-11-17 | End: 2019-12-02

## 2019-12-02 NOTE — PHYSICAL EXAM
[No JVD] : no jugular venous distention [No Respiratory Distress] : no respiratory distress  [Soft] : abdomen soft [Normal Rate] : normal rate  [Non Tender] : non-tender [No HSM] : no HSM [No Joint Swelling] : no joint swelling [No Rash] : no rash [No Focal Deficits] : no focal deficits [Normal Affect] : the affect was normal [de-identified] : overweight

## 2019-12-02 NOTE — ASSESSMENT
[FreeTextEntry1] : This is a 59-year-old hypertensive with pancreatic divisum with her second attack of pancreatitis\par \par A review of her hospital course was given above\par \par Laboratory data did show a decrease in liver enzymes this was repeated today\par \par Currently she is asymptomatic. She has no history of alcohol or any other inciting episodes\par \par I have referred her back to the gastroenterologist that took care of her at that time.\par \par In addition she is having some ulnar dysesthesias in the right hand. This is probably positional I told her that we will have her seen by hand surgery if necessary and if this persists

## 2019-12-02 NOTE — HISTORY OF PRESENT ILLNESS
[Admitted on: ___] : The patient was admitted on [unfilled] [Discharged on ___] : discharged on [unfilled] [Pertinent Labs] : pertinent labs [Discharge Summary] : discharge summary [Med Reconciliation] : medication reconciliation has been completed [Radiology Findings] : radiology findings [FreeTextEntry2] : This is a 59-year-old female who was hospitalized with pancreatitis. She had a similar hospitalization approximately 13 years ago. She is known to have any pancreatic devisum. This was initially stented during her first hospitalization and then the stent was removed. She has been asymptomatic since until this episode\par \par During the hospital stay she did become shortness of breath the CTPA was done which was negative for pulmonary embolus.\par \par She did receive an MRCP which showed no specific pathology questionable hemorrhagic pancreatitis with no change in hematocrit.\par \par She was treated with conservative measures and currently feels fine

## 2019-12-03 LAB
25(OH)D3 SERPL-MCNC: 24.4 NG/ML
ALBUMIN SERPL ELPH-MCNC: 3.9 G/DL
ALP BLD-CCNC: 178 U/L
ALT SERPL-CCNC: 152 U/L
AMYLASE/CREAT SERPL: 77 U/L
ANION GAP SERPL CALC-SCNC: 15 MMOL/L
AST SERPL-CCNC: 52 U/L
BASOPHILS # BLD AUTO: 0.03 K/UL
BASOPHILS NFR BLD AUTO: 0.3 %
BILIRUB SERPL-MCNC: 0.2 MG/DL
BUN SERPL-MCNC: 17 MG/DL
CALCIUM SERPL-MCNC: 9.5 MG/DL
CHLORIDE SERPL-SCNC: 104 MMOL/L
CHOLEST SERPL-MCNC: 169 MG/DL
CHOLEST/HDLC SERPL: 5.1 RATIO
CO2 SERPL-SCNC: 23 MMOL/L
CREAT SERPL-MCNC: 0.83 MG/DL
EOSINOPHIL # BLD AUTO: 0.07 K/UL
EOSINOPHIL NFR BLD AUTO: 0.7 %
ESTIMATED AVERAGE GLUCOSE: 120 MG/DL
GLUCOSE SERPL-MCNC: 127 MG/DL
HBA1C MFR BLD HPLC: 5.8 %
HCT VFR BLD CALC: 46.6 %
HDLC SERPL-MCNC: 33 MG/DL
HGB BLD-MCNC: 14.9 G/DL
IMM GRANULOCYTES NFR BLD AUTO: 2.3 %
LDLC SERPL CALC-MCNC: 110 MG/DL
LPL SERPL-CCNC: 178 U/L
LYMPHOCYTES # BLD AUTO: 1.01 K/UL
LYMPHOCYTES NFR BLD AUTO: 9.6 %
MAN DIFF?: NORMAL
MCHC RBC-ENTMCNC: 28.3 PG
MCHC RBC-ENTMCNC: 32 GM/DL
MCV RBC AUTO: 88.6 FL
MONOCYTES # BLD AUTO: 0.58 K/UL
MONOCYTES NFR BLD AUTO: 5.5 %
NEUTROPHILS # BLD AUTO: 8.58 K/UL
NEUTROPHILS NFR BLD AUTO: 81.6 %
PLATELET # BLD AUTO: 469 K/UL
POTASSIUM SERPL-SCNC: 4.1 MMOL/L
PROT SERPL-MCNC: 6.6 G/DL
RBC # BLD: 5.26 M/UL
RBC # FLD: 14.4 %
SAVE SPECIMEN: NORMAL
SODIUM SERPL-SCNC: 142 MMOL/L
T3RU NFR SERPL: 0.9 TBI
T4 SERPL-MCNC: 10.9 UG/DL
TRIGL SERPL-MCNC: 130 MG/DL
TSH SERPL-ACNC: 4.04 UIU/ML
URATE SERPL-MCNC: 4.2 MG/DL
WBC # FLD AUTO: 10.51 K/UL

## 2019-12-11 ENCOUNTER — APPOINTMENT (OUTPATIENT)
Dept: INTERNAL MEDICINE | Facility: CLINIC | Age: 59
End: 2019-12-11
Payer: COMMERCIAL

## 2019-12-11 PROCEDURE — 36415 COLL VENOUS BLD VENIPUNCTURE: CPT

## 2019-12-12 LAB
ALBUMIN SERPL ELPH-MCNC: 4.6 G/DL
ALP BLD-CCNC: 94 U/L
ALT SERPL-CCNC: 36 U/L
AMYLASE/CREAT SERPL: 80 U/L
ANION GAP SERPL CALC-SCNC: 15 MMOL/L
AST SERPL-CCNC: 20 U/L
BASOPHILS # BLD AUTO: 0.03 K/UL
BASOPHILS NFR BLD AUTO: 0.5 %
BILIRUB SERPL-MCNC: 0.4 MG/DL
BUN SERPL-MCNC: 18 MG/DL
CALCIUM SERPL-MCNC: 10.5 MG/DL
CHLORIDE SERPL-SCNC: 104 MMOL/L
CO2 SERPL-SCNC: 24 MMOL/L
CREAT SERPL-MCNC: 0.94 MG/DL
EOSINOPHIL # BLD AUTO: 0.05 K/UL
EOSINOPHIL NFR BLD AUTO: 0.8 %
GLUCOSE SERPL-MCNC: 98 MG/DL
HCT VFR BLD CALC: 43 %
HGB BLD-MCNC: 13.8 G/DL
IMM GRANULOCYTES NFR BLD AUTO: 0.3 %
LPL SERPL-CCNC: 121 U/L
LYMPHOCYTES # BLD AUTO: 1.36 K/UL
LYMPHOCYTES NFR BLD AUTO: 20.9 %
MAN DIFF?: NORMAL
MCHC RBC-ENTMCNC: 28.3 PG
MCHC RBC-ENTMCNC: 32.1 GM/DL
MCV RBC AUTO: 88.1 FL
MONOCYTES # BLD AUTO: 0.49 K/UL
MONOCYTES NFR BLD AUTO: 7.5 %
NEUTROPHILS # BLD AUTO: 4.55 K/UL
NEUTROPHILS NFR BLD AUTO: 70 %
PLATELET # BLD AUTO: 483 K/UL
POTASSIUM SERPL-SCNC: 4.5 MMOL/L
PROT SERPL-MCNC: 7 G/DL
RBC # BLD: 4.88 M/UL
RBC # FLD: 14.2 %
SAVE SPECIMEN: NORMAL
SODIUM SERPL-SCNC: 143 MMOL/L
WBC # FLD AUTO: 6.5 K/UL

## 2020-01-02 ENCOUNTER — APPOINTMENT (OUTPATIENT)
Dept: INTERNAL MEDICINE | Facility: CLINIC | Age: 60
End: 2020-01-02
Payer: COMMERCIAL

## 2020-01-02 PROCEDURE — 36415 COLL VENOUS BLD VENIPUNCTURE: CPT

## 2020-01-03 ENCOUNTER — MEDICATION RENEWAL (OUTPATIENT)
Age: 60
End: 2020-01-03

## 2020-01-03 LAB
AMYLASE/CREAT SERPL: 52 U/L
BASOPHILS # BLD AUTO: 0.03 K/UL
BASOPHILS NFR BLD AUTO: 0.4 %
EOSINOPHIL # BLD AUTO: 0.06 K/UL
EOSINOPHIL NFR BLD AUTO: 0.9 %
HCT VFR BLD CALC: 44 %
HGB BLD-MCNC: 14.6 G/DL
IMM GRANULOCYTES NFR BLD AUTO: 0.4 %
LPL SERPL-CCNC: 50 U/L
LYMPHOCYTES # BLD AUTO: 1.07 K/UL
LYMPHOCYTES NFR BLD AUTO: 15.4 %
MAN DIFF?: NORMAL
MCHC RBC-ENTMCNC: 29.1 PG
MCHC RBC-ENTMCNC: 33.2 GM/DL
MCV RBC AUTO: 87.6 FL
MONOCYTES # BLD AUTO: 0.53 K/UL
MONOCYTES NFR BLD AUTO: 7.6 %
NEUTROPHILS # BLD AUTO: 5.23 K/UL
NEUTROPHILS NFR BLD AUTO: 75.3 %
PLATELET # BLD AUTO: 283 K/UL
RBC # BLD: 5.02 M/UL
RBC # FLD: 14.5 %
SAVE SPECIMEN: NORMAL
WBC # FLD AUTO: 6.95 K/UL

## 2020-01-08 ENCOUNTER — APPOINTMENT (OUTPATIENT)
Dept: GASTROENTEROLOGY | Facility: CLINIC | Age: 60
End: 2020-01-08
Payer: COMMERCIAL

## 2020-01-08 VITALS
HEIGHT: 65 IN | DIASTOLIC BLOOD PRESSURE: 80 MMHG | SYSTOLIC BLOOD PRESSURE: 120 MMHG | TEMPERATURE: 98.2 F | OXYGEN SATURATION: 98 % | HEART RATE: 95 BPM | BODY MASS INDEX: 38.99 KG/M2 | WEIGHT: 234 LBS

## 2020-01-08 DIAGNOSIS — R74.8 ABNORMAL LEVELS OF OTHER SERUM ENZYMES: ICD-10-CM

## 2020-01-08 DIAGNOSIS — K57.30 DIVERTICULOSIS OF LARGE INTESTINE W/OUT PERFORATION OR ABSCESS W/OUT BLEEDING: ICD-10-CM

## 2020-01-08 DIAGNOSIS — Q45.3 OTHER CONGENITAL MALFORMATIONS OF PANCREAS AND PANCREATIC DUCT: ICD-10-CM

## 2020-01-08 PROCEDURE — 99205 OFFICE O/P NEW HI 60 MIN: CPT

## 2020-01-08 NOTE — CONSULT LETTER
[Dear  ___] : Dear  [unfilled], [Consult Letter:] : I had the pleasure of evaluating your patient, [unfilled]. [( Thank you for referring [unfilled] for consultation for _____ )] : Thank you for referring [unfilled] for consultation for [unfilled] [Please see my note below.] : Please see my note below. [Consult Closing:] : Thank you very much for allowing me to participate in the care of this patient.  If you have any questions, please do not hesitate to contact me. [Sincerely,] : Sincerely, [FreeTextEntry2] : Dr. Rohit Couch [FreeTextEntry3] : Rogerio Syed M.D.

## 2020-01-08 NOTE — HISTORY OF PRESENT ILLNESS
[FreeTextEntry1] : Office consultation on 1/8/2020.\par \par The patient is a 59-year-old woman evaluated for consultation following hospitalization for a recent episode of acute pancreatitis. PMD: Dr. Rohit Couch.\par \par The patient has a prior history of recurrent acute pancreatitis in 2003 and 2004. Had at least 3 episodes. Definite etiology was never ascertained. No history of alcohol or gallstones. Imaging noted for pancreas divisum at that time. In 2004 underwent ERCP and pancreatic duct stent placement. In addition, at about that time also underwent laparoscopic cholecystectomy (apparently empirically as no gallstones detected). Subsequently since that time has been fine without recurrent episodes of pancreatitis up until current episode.\par \par On 11/23/19 at 10 AM experienced onset of a severe constant upper abdominal pain radiating around to her back. There was associated nausea and nonbloody nonbilious emesis. No fever. Symptoms were the same as her prior episode of acute pancreatitis prompting her to go to the ER. No precipitating factors reported such as trauma, new medications or any alcohol. Had typical breakfast of a buttered bagel at 7:30 AM which was usual for her.\par \par Hospitalized for 8 days. Indicates having severe pain lasting for 4-5 days with total episode of pain lasting one week. However, this was somewhat less severe than prior episode of pancreatitis in 2013 which was more protracted resulting in a three-week hospitalization with imaging associated with pancreatic necrosis at that time.\par \par At present, indicates feeling well. Fully recovered. Denies fever. Appetite stable and reports 20 pound weight loss from that hospitalization. Denies any complaints of dysphagia, heartburn, nausea or vomiting. No current abdominal pain. Has bowel movements daily to every other day without any complaints of diarrhea, constipation, change in bowel habit or rectal bleeding.\par \par When hospitalized on 11/23/19 CBC was noted for WBC 12,500, hemoglobin 15.6 hematocrit 46.9. Amylase was 225 and lipase was > 3000. Triglycerides were normal at 127. Calcium was normal at 7.6. Liver enzymes on admission were normal including ALT 25.\par \par During hospitalization patient's liver enzymes were noted to be increased. On 11/30/19 alkaline phosphatase was 232, AST 64 and . At discharge on 12/1/19 CBC was normal and liver enzymes were noted for alkaline phosphatase 194, AST 45 and .\par \par CT scan abdomen 11/23/19 was noted for acute interstitial edematous pancreatitis (CTSI score of 2). No focal necrosis. Status post cholecystectomy. Normal liver. CBD slightly enlarged reflecting postcholecystectomy state. Fibroid uterus noted. Diverticulosis reported.\par \par Abdominal ultrasound on 11/25/19 noted for normal liver, normal bile ducts and status post cholecystectomy. Mild pancreatic duct dilation to 4 mm reported.\par \par MRCP on 11/30/19 noted for features of pancreatitis with component of hemorrhagic pancreatitis. No evidence of necrosis. Liver and bile duct was normal. Status post cholecystectomy. Reactive duodenitis noted. Possible partial splenic vein thrombus noted.\par \par Since discharge followup lab assessment as noted:\par -CBC: WBC 6950, hemoglobin 14.6, hematocrit 44, platelet count 283,000.\par -Amylase 52.\par -Lipase 50.\par -CMP: normal. Creatinine 0.94. Calcium 10.5. Total protein 7, albumin 4.6, bilirubin 0.4, AST 20, ALT 36.\par \par Patient reports no other history of digestive illness except as noted. Family history of colon cancer not reported. Family history of pancreatitis not reported.  Patient had a negative Cologuard test on 3/10/2019.

## 2020-01-08 NOTE — PHYSICAL EXAM
[General Appearance - Alert] : alert [General Appearance - In No Acute Distress] : in no acute distress [General Appearance - Well Developed] : well developed [General Appearance - Well-Appearing] : healthy appearing [Sclera] : the sclera and conjunctiva were normal [Oropharynx] : the oropharynx was normal [Neck Appearance] : the appearance of the neck was normal [Neck Cervical Mass (___cm)] : no neck mass was observed [Respiration, Rhythm And Depth] : normal respiratory rhythm and effort [Auscultation Breath Sounds / Voice Sounds] : lungs were clear to auscultation bilaterally [Exaggerated Use Of Accessory Muscles For Inspiration] : no accessory muscle use [Heart Rate And Rhythm] : heart rate was normal and rhythm regular [Heart Sounds] : normal S1 and S2 [Murmurs] : no murmurs [Heart Sounds Gallop] : no gallops [Edema] : there was no peripheral edema [Heart Sounds Pericardial Friction Rub] : no pericardial rub [Bowel Sounds] : normal bowel sounds [Abdomen Tenderness] : non-tender [] : no hepato-splenomegaly [Abdomen Soft] : soft [Abdomen Mass (___ Cm)] : no abdominal mass palpated [Abdomen Hernia] : no hernia was discovered [Cervical Lymph Nodes Enlarged Posterior Bilaterally] : posterior cervical [Supraclavicular Lymph Nodes Enlarged Bilaterally] : supraclavicular [Cervical Lymph Nodes Enlarged Anterior Bilaterally] : anterior cervical [No CVA Tenderness] : no ~M costovertebral angle tenderness [Abnormal Walk] : normal gait [Nail Clubbing] : no clubbing  or cyanosis of the fingernails [Skin Color & Pigmentation] : normal skin color and pigmentation [Oriented To Time, Place, And Person] : oriented to person, place, and time [Impaired Insight] : insight and judgment were intact [Affect] : the affect was normal [Mood] : the mood was normal [FreeTextEntry1] : The abdomen is obese, soft, nontender, nondistended and with no mass or hepatosplenomegaly.

## 2020-01-08 NOTE — REVIEW OF SYSTEMS
[As Noted in HPI] : as noted in HPI [Joint Pain] : joint pain [Anxiety] : anxiety [Negative] : Heme/Lymph

## 2020-01-08 NOTE — ASSESSMENT
[FreeTextEntry1] : Impression:\par \par #1 recurrent acute pancreatitis-multiple episodes of severe acute pancreatitis in 2003 and 2004 with subsequent normal interval up until recurrent episode of 11/2019. Definite etiology never determined. Pancreas divisum noted which may have been contributory.  No indication of gallstones and no history of alcohol intake. No evidence of hypercalcemia, hypertriglyceridemia, trauma or new medications to account for this episode. Currently recovered-no abdominal pain.\par \par #2 elevated liver enzymes-noted at recent hospitalization. Not evident on admission but occurred during hospitalization. No significant biliary dilation or choledocholithiasis. Possibly related to acute pancreatitis with CBD compression. Resolved at the current time.\par \par #3 colon cancer screening-normal Cologuard 3/2019.\par \par #4 obesity.\par \par #5 status post laparoscopic cholecystectomy.\par \par #6 osteoarthritis.\par \par #7 history of pneumonia.\par \par #8 hypothyroid-status post thyroidectomy.\par \par #8 hypertension

## 2020-01-08 NOTE — REASON FOR VISIT
[Post Hospitalization] : a post hospitalization visit [FreeTextEntry1] : for followup of episode of acute pancreatitis.

## 2020-01-14 ENCOUNTER — APPOINTMENT (OUTPATIENT)
Dept: OPHTHALMOLOGY | Facility: CLINIC | Age: 60
End: 2020-01-14
Payer: COMMERCIAL

## 2020-01-14 ENCOUNTER — NON-APPOINTMENT (OUTPATIENT)
Age: 60
End: 2020-01-14

## 2020-01-14 PROCEDURE — 92012 INTRM OPH EXAM EST PATIENT: CPT

## 2020-02-18 ENCOUNTER — APPOINTMENT (OUTPATIENT)
Dept: MRI IMAGING | Facility: CLINIC | Age: 60
End: 2020-02-18
Payer: COMMERCIAL

## 2020-02-18 ENCOUNTER — OUTPATIENT (OUTPATIENT)
Dept: OUTPATIENT SERVICES | Facility: HOSPITAL | Age: 60
LOS: 1 days | End: 2020-02-18
Payer: COMMERCIAL

## 2020-02-18 DIAGNOSIS — Z98.890 OTHER SPECIFIED POSTPROCEDURAL STATES: Chronic | ICD-10-CM

## 2020-02-18 DIAGNOSIS — Z00.8 ENCOUNTER FOR OTHER GENERAL EXAMINATION: ICD-10-CM

## 2020-02-18 PROCEDURE — 74183 MRI ABD W/O CNTR FLWD CNTR: CPT

## 2020-02-18 PROCEDURE — A9585: CPT

## 2020-02-18 PROCEDURE — 74183 MRI ABD W/O CNTR FLWD CNTR: CPT | Mod: 26

## 2020-03-13 ENCOUNTER — LABORATORY RESULT (OUTPATIENT)
Age: 60
End: 2020-03-13

## 2020-03-13 ENCOUNTER — NON-APPOINTMENT (OUTPATIENT)
Age: 60
End: 2020-03-13

## 2020-03-13 ENCOUNTER — APPOINTMENT (OUTPATIENT)
Dept: INTERNAL MEDICINE | Facility: CLINIC | Age: 60
End: 2020-03-13
Payer: COMMERCIAL

## 2020-03-13 VITALS
BODY MASS INDEX: 38.49 KG/M2 | DIASTOLIC BLOOD PRESSURE: 72 MMHG | HEIGHT: 65 IN | WEIGHT: 231 LBS | SYSTOLIC BLOOD PRESSURE: 120 MMHG

## 2020-03-13 DIAGNOSIS — E88.09 OTHER DISORDERS OF PLASMA-PROTEIN METABOLISM, NOT ELSEWHERE CLASSIFIED: ICD-10-CM

## 2020-03-13 PROCEDURE — 36415 COLL VENOUS BLD VENIPUNCTURE: CPT

## 2020-03-13 PROCEDURE — 93000 ELECTROCARDIOGRAM COMPLETE: CPT

## 2020-03-13 PROCEDURE — 99214 OFFICE O/P EST MOD 30 MIN: CPT | Mod: 25

## 2020-03-14 LAB
25(OH)D3 SERPL-MCNC: 22.5 NG/ML
ALBUMIN SERPL ELPH-MCNC: 4.9 G/DL
ALP BLD-CCNC: 85 U/L
ALT SERPL-CCNC: 28 U/L
ANION GAP SERPL CALC-SCNC: 15 MMOL/L
AST SERPL-CCNC: 18 U/L
BASOPHILS # BLD AUTO: 0.03 K/UL
BASOPHILS NFR BLD AUTO: 0.5 %
BILIRUB SERPL-MCNC: 0.3 MG/DL
BUN SERPL-MCNC: 26 MG/DL
CALCIUM SERPL-MCNC: 10.2 MG/DL
CHLORIDE SERPL-SCNC: 100 MMOL/L
CHOLEST SERPL-MCNC: 205 MG/DL
CHOLEST/HDLC SERPL: 3.1 RATIO
CO2 SERPL-SCNC: 24 MMOL/L
CREAT SERPL-MCNC: 0.91 MG/DL
EOSINOPHIL # BLD AUTO: 0.08 K/UL
EOSINOPHIL NFR BLD AUTO: 1.4 %
ESTIMATED AVERAGE GLUCOSE: 114 MG/DL
GLUCOSE SERPL-MCNC: 84 MG/DL
HBA1C MFR BLD HPLC: 5.6 %
HCT VFR BLD CALC: 47.6 %
HDLC SERPL-MCNC: 65 MG/DL
HGB BLD-MCNC: 15.4 G/DL
IMM GRANULOCYTES NFR BLD AUTO: 0.3 %
LDLC SERPL CALC-MCNC: 118 MG/DL
LYMPHOCYTES # BLD AUTO: 1.38 K/UL
LYMPHOCYTES NFR BLD AUTO: 24 %
MAN DIFF?: NORMAL
MCHC RBC-ENTMCNC: 28.4 PG
MCHC RBC-ENTMCNC: 32.4 GM/DL
MCV RBC AUTO: 87.8 FL
MONOCYTES # BLD AUTO: 0.46 K/UL
MONOCYTES NFR BLD AUTO: 8 %
NEUTROPHILS # BLD AUTO: 3.77 K/UL
NEUTROPHILS NFR BLD AUTO: 65.8 %
PLATELET # BLD AUTO: 278 K/UL
POTASSIUM SERPL-SCNC: 4.3 MMOL/L
PROT SERPL-MCNC: 7.5 G/DL
RBC # BLD: 5.42 M/UL
RBC # FLD: 13.6 %
SODIUM SERPL-SCNC: 139 MMOL/L
T3RU NFR SERPL: 1 TBI
T4 SERPL-MCNC: 10.5 UG/DL
TRIGL SERPL-MCNC: 107 MG/DL
TSH SERPL-ACNC: 4.92 UIU/ML
URATE SERPL-MCNC: 4.6 MG/DL
WBC # FLD AUTO: 5.74 K/UL

## 2020-03-14 NOTE — HISTORY OF PRESENT ILLNESS
[No Pertinent Cardiac History] : no history of aortic stenosis, atrial fibrillation, coronary artery disease, recent myocardial infarction, or implantable device/pacemaker [No Pertinent Pulmonary History] : no history of asthma, COPD, sleep apnea, or smoking [FreeTextEntry1] : Excision of neuroma [FreeTextEntry2] : 3/19/20 [FreeTextEntry3] :  [FreeTextEntry4] : This is a 59-year-old female for preoperative evaluation prior to removal of a neuroma on her foot. Patient has a history of hypertension hypercholesterolemia hypothyroidism and recurrent pancreatitis\par \par Currently she has no complaints other than the pain in her foot

## 2020-03-14 NOTE — PHYSICAL EXAM
[Normal Sclera/Conjunctiva] : normal sclera/conjunctiva [No JVD] : no jugular venous distention [No Edema] : there was no peripheral edema [No Joint Swelling] : no joint swelling [No Focal Deficits] : no focal deficits [Normal] : affect was normal and insight and judgment were intact [de-identified] : overweight

## 2020-03-14 NOTE — ASSESSMENT
[FreeTextEntry4] : This is a 59-year-old female whose history has been reviewed above\par \par She has history of hypertension blood pressure is under excellent control\par \par She has had recurrent pancreatitis the last episode was in 2019 she was evaluated by GI including radiologic imaging. She was noted to have a normal pancreas and no residual evidence of inflammation. She currently remains asymptomatic\par \par She has a history of hypothyroidism she is on Synthroid and has been clinically and biochemically euthyroid\par \par She has a history of hypercholesterolemia she remains on simvastatin with good control\par \par Review of systems is negative other than foot pain.\par \par Her physical examination is normal other than her weight\par \par EKG shows no ischemic changes pending laboratory no medical contraindications to surgery

## 2020-03-16 LAB — SAVE SPECIMEN: NORMAL

## 2020-04-29 ENCOUNTER — APPOINTMENT (OUTPATIENT)
Dept: INTERNAL MEDICINE | Facility: CLINIC | Age: 60
End: 2020-04-29

## 2020-05-30 ENCOUNTER — LABORATORY RESULT (OUTPATIENT)
Age: 60
End: 2020-05-30

## 2020-06-01 LAB
25(OH)D3 SERPL-MCNC: 37.2 NG/ML
SAVE SPECIMEN: NORMAL
T3RU NFR SERPL: 1 TBI
T4 SERPL-MCNC: 9.8 UG/DL
TSH SERPL-ACNC: 4.55 UIU/ML

## 2020-06-02 DIAGNOSIS — Z11.59 ENCOUNTER FOR SCREENING FOR OTHER VIRAL DISEASES: ICD-10-CM

## 2020-06-03 LAB
SARS-COV-2 IGG SERPL IA-ACNC: <0.1 INDEX
SARS-COV-2 IGG SERPL QL IA: NEGATIVE

## 2020-07-07 ENCOUNTER — APPOINTMENT (OUTPATIENT)
Dept: OPHTHALMOLOGY | Facility: CLINIC | Age: 60
End: 2020-07-07
Payer: COMMERCIAL

## 2020-07-07 ENCOUNTER — NON-APPOINTMENT (OUTPATIENT)
Age: 60
End: 2020-07-07

## 2020-07-07 PROCEDURE — 92020 GONIOSCOPY: CPT

## 2020-07-07 PROCEDURE — 92012 INTRM OPH EXAM EST PATIENT: CPT

## 2020-12-31 ENCOUNTER — RX RENEWAL (OUTPATIENT)
Age: 60
End: 2020-12-31

## 2021-01-05 ENCOUNTER — APPOINTMENT (OUTPATIENT)
Dept: OPHTHALMOLOGY | Facility: CLINIC | Age: 61
End: 2021-01-05
Payer: COMMERCIAL

## 2021-01-05 ENCOUNTER — NON-APPOINTMENT (OUTPATIENT)
Age: 61
End: 2021-01-05

## 2021-01-05 PROCEDURE — 92020 GONIOSCOPY: CPT

## 2021-01-05 PROCEDURE — 99072 ADDL SUPL MATRL&STAF TM PHE: CPT

## 2021-01-05 PROCEDURE — 92012 INTRM OPH EXAM EST PATIENT: CPT

## 2021-01-05 PROCEDURE — 92014 COMPRE OPH EXAM EST PT 1/>: CPT

## 2021-04-26 ENCOUNTER — APPOINTMENT (OUTPATIENT)
Dept: INTERNAL MEDICINE | Facility: CLINIC | Age: 61
End: 2021-04-26
Payer: COMMERCIAL

## 2021-04-26 ENCOUNTER — APPOINTMENT (OUTPATIENT)
Dept: RHEUMATOLOGY | Facility: CLINIC | Age: 61
End: 2021-04-26
Payer: COMMERCIAL

## 2021-04-26 ENCOUNTER — LABORATORY RESULT (OUTPATIENT)
Age: 61
End: 2021-04-26

## 2021-04-26 VITALS
SYSTOLIC BLOOD PRESSURE: 120 MMHG | TEMPERATURE: 97.5 F | DIASTOLIC BLOOD PRESSURE: 80 MMHG | BODY MASS INDEX: 40.29 KG/M2 | HEIGHT: 64 IN | WEIGHT: 236 LBS

## 2021-04-26 DIAGNOSIS — G47.9 SLEEP DISORDER, UNSPECIFIED: ICD-10-CM

## 2021-04-26 DIAGNOSIS — M79.10 MYALGIA, UNSPECIFIED SITE: ICD-10-CM

## 2021-04-26 DIAGNOSIS — R79.89 OTHER SPECIFIED ABNORMAL FINDINGS OF BLOOD CHEMISTRY: ICD-10-CM

## 2021-04-26 DIAGNOSIS — R53.83 OTHER FATIGUE: ICD-10-CM

## 2021-04-26 PROCEDURE — 99072 ADDL SUPL MATRL&STAF TM PHE: CPT

## 2021-04-26 PROCEDURE — 99213 OFFICE O/P EST LOW 20 MIN: CPT

## 2021-04-26 PROCEDURE — 99204 OFFICE O/P NEW MOD 45 MIN: CPT

## 2021-04-26 NOTE — HISTORY OF PRESENT ILLNESS
[FreeTextEntry8] : This is a 60-year-old female who comes in in tears because of severe and debilitating pain the pain is mostly in her upper thighs and shoulders although she is getting wrist pain and does have pain in other areas.  She states it is mostly muscular rather than joint.  She works as a  and is unable to perform her activities\par \par She is on simvastatin\par \par CPK done in 2017 was normal as was a rheumatoid factor and Lyme's\par \par \par \par

## 2021-04-26 NOTE — ASSESSMENT
[FreeTextEntry1] : Patient's history is most consistent with polymyalgia rheumatica.  However I am reluctant to draw bloods and start steroids.  Patient needs an emergency rheumatologic consultation if I cannot obtain that of course I will draw the appropriate bloods and start her on steroids\par \par We are attempting to arrange a consultation\par \par Auscultation was  made for the cefdinir she will call me after this is completed

## 2021-04-26 NOTE — PHYSICAL EXAM
[Normal] : normal rate, regular rhythm, normal S1 and S2 and no murmur heard [de-identified] : Possible tenderness.  Mostly proximal.  Weakness but no loss of muscle tone

## 2021-05-03 LAB
25(OH)D3 SERPL-MCNC: 23.4 NG/ML
ALBUMIN MFR SERPL ELPH: 57.8 %
ALBUMIN SERPL ELPH-MCNC: 4.8 G/DL
ALBUMIN SERPL-MCNC: 4.2 G/DL
ALBUMIN/GLOB SERPL: 1.4 RATIO
ALBUPE: 38.2 %
ALBUPE: 38.2 %
ALP BLD-CCNC: 98 U/L
ALPHA1 GLOB MFR SERPL ELPH: 5.9 %
ALPHA1 GLOB SERPL ELPH-MCNC: 0.4 G/DL
ALPHA1UPE: 20.2 %
ALPHA1UPE: 20.2 %
ALPHA2 GLOB MFR SERPL ELPH: 13.2 %
ALPHA2 GLOB SERPL ELPH-MCNC: 1 G/DL
ALPHA2UPE: 24.1 %
ALPHA2UPE: 24.1 %
ALT SERPL-CCNC: 25 U/L
ANION GAP SERPL CALC-SCNC: 17 MMOL/L
AST SERPL-CCNC: 18 U/L
B-GLOBULIN MFR SERPL ELPH: 11.3 %
B-GLOBULIN SERPL ELPH-MCNC: 0.8 G/DL
BASOPHILS # BLD AUTO: 0.02 K/UL
BASOPHILS NFR BLD AUTO: 0.2 %
BETAUPE: 8.4 %
BETAUPE: 8.4 %
BILIRUB SERPL-MCNC: 0.3 MG/DL
BUN SERPL-MCNC: 28 MG/DL
CALCIUM SERPL-MCNC: 11.1 MG/DL
CCP AB SER IA-ACNC: <8 UNITS
CHLORIDE SERPL-SCNC: 100 MMOL/L
CO2 SERPL-SCNC: 25 MMOL/L
CREAT 24H UR-MCNC: NORMAL G/24 H
CREAT SERPL-MCNC: 1.31 MG/DL
CREATININE UR (MAYO): 575 MG/DL
CRP SERPL-MCNC: 10 MG/L
EOSINOPHIL # BLD AUTO: 0.03 K/UL
EOSINOPHIL NFR BLD AUTO: 0.4 %
ERYTHROCYTE [SEDIMENTATION RATE] IN BLOOD BY WESTERGREN METHOD: 19 MM/HR
FOLATE SERPL-MCNC: 12.4 NG/ML
GAMMA GLOB FLD ELPH-MCNC: 0.8 G/DL
GAMMA GLOB MFR SERPL ELPH: 11.8 %
GAMMAUPE: 9.1 %
GAMMAUPE: 9.1 %
HCT VFR BLD CALC: 46.8 %
HGB BLD-MCNC: 14.9 G/DL
IGA 24H UR QL IFE: NORMAL
IGA 24H UR QL IFE: NORMAL
IMM GRANULOCYTES NFR BLD AUTO: 0.4 %
INTERPRETATION SERPL IEP-IMP: NORMAL
KAPPA LC 24H UR QL: NORMAL
KAPPA LC 24H UR QL: NORMAL
LYMPHOCYTES # BLD AUTO: 1.26 K/UL
LYMPHOCYTES NFR BLD AUTO: 15.1 %
M PROTEIN SPEC IFE-MCNC: NORMAL
MAN DIFF?: NORMAL
MCHC RBC-ENTMCNC: 28.3 PG
MCHC RBC-ENTMCNC: 31.8 GM/DL
MCV RBC AUTO: 88.8 FL
MONOCYTES # BLD AUTO: 0.73 K/UL
MONOCYTES NFR BLD AUTO: 8.7 %
NEUTROPHILS # BLD AUTO: 6.3 K/UL
NEUTROPHILS NFR BLD AUTO: 75.2 %
PLATELET # BLD AUTO: 376 K/UL
POTASSIUM SERPL-SCNC: 4.5 MMOL/L
PROT PATTERN 24H UR ELPH-IMP: NORMAL
PROT PATTERN 24H UR ELPH-IMP: NORMAL
PROT SERPL-MCNC: 7.2 G/DL
PROT UR-MCNC: 190 MG/DL
RBC # BLD: 5.27 M/UL
RBC # FLD: 13.9 %
RF+CCP IGG SER-IMP: NEGATIVE
RHEUMATOID FACT SER QL: <10 IU/ML
SODIUM SERPL-SCNC: 142 MMOL/L
SPECIMEN VOL 24H UR: NORMAL ML
T3 SERPL-MCNC: >650 NG/DL
T4 SERPL-MCNC: 1.2 UG/DL
TSH SERPL-ACNC: 5.63 UIU/ML
VIT B12 SERPL-MCNC: 903 PG/ML
WBC # FLD AUTO: 8.37 K/UL

## 2021-06-07 ENCOUNTER — APPOINTMENT (OUTPATIENT)
Dept: RHEUMATOLOGY | Facility: CLINIC | Age: 61
End: 2021-06-07
Payer: COMMERCIAL

## 2021-06-07 ENCOUNTER — RESULT REVIEW (OUTPATIENT)
Age: 61
End: 2021-06-07

## 2021-06-07 VITALS
HEIGHT: 64 IN | TEMPERATURE: 97.9 F | OXYGEN SATURATION: 98 % | HEART RATE: 90 BPM | SYSTOLIC BLOOD PRESSURE: 119 MMHG | BODY MASS INDEX: 40.12 KG/M2 | WEIGHT: 235 LBS | RESPIRATION RATE: 16 BRPM | DIASTOLIC BLOOD PRESSURE: 79 MMHG

## 2021-06-07 DIAGNOSIS — M79.10 MYALGIA, UNSPECIFIED SITE: ICD-10-CM

## 2021-06-07 DIAGNOSIS — M25.50 PAIN IN UNSPECIFIED JOINT: ICD-10-CM

## 2021-06-07 DIAGNOSIS — E07.9 DISORDER OF THYROID, UNSPECIFIED: ICD-10-CM

## 2021-06-07 PROCEDURE — 99214 OFFICE O/P EST MOD 30 MIN: CPT

## 2021-06-07 PROCEDURE — 99072 ADDL SUPL MATRL&STAF TM PHE: CPT

## 2021-06-08 ENCOUNTER — NON-APPOINTMENT (OUTPATIENT)
Age: 61
End: 2021-06-08

## 2021-06-08 DIAGNOSIS — E83.52 HYPERCALCEMIA: ICD-10-CM

## 2021-06-08 LAB
ALBUMIN SERPL ELPH-MCNC: 4.9 G/DL
ALP BLD-CCNC: 73 U/L
ALT SERPL-CCNC: 26 U/L
ANION GAP SERPL CALC-SCNC: 17 MMOL/L
AST SERPL-CCNC: 16 U/L
BASOPHILS # BLD AUTO: 0.01 K/UL
BASOPHILS NFR BLD AUTO: 0.1 %
BILIRUB SERPL-MCNC: 0.3 MG/DL
BUN SERPL-MCNC: 27 MG/DL
CALCIUM SERPL-MCNC: 11.6 MG/DL
CHLORIDE SERPL-SCNC: 101 MMOL/L
CO2 SERPL-SCNC: 24 MMOL/L
CREAT SERPL-MCNC: 1.16 MG/DL
CRP SERPL-MCNC: 4 MG/L
EOSINOPHIL # BLD AUTO: 0.03 K/UL
EOSINOPHIL NFR BLD AUTO: 0.4 %
ERYTHROCYTE [SEDIMENTATION RATE] IN BLOOD BY WESTERGREN METHOD: 9 MM/HR
HCT VFR BLD CALC: 44.8 %
HGB BLD-MCNC: 14.6 G/DL
IMM GRANULOCYTES NFR BLD AUTO: 0.4 %
LYMPHOCYTES # BLD AUTO: 1.11 K/UL
LYMPHOCYTES NFR BLD AUTO: 14 %
MAN DIFF?: NORMAL
MCHC RBC-ENTMCNC: 28.9 PG
MCHC RBC-ENTMCNC: 32.6 GM/DL
MCV RBC AUTO: 88.7 FL
MONOCYTES # BLD AUTO: 0.46 K/UL
MONOCYTES NFR BLD AUTO: 5.8 %
NEUTROPHILS # BLD AUTO: 6.27 K/UL
NEUTROPHILS NFR BLD AUTO: 79.3 %
PLATELET # BLD AUTO: 317 K/UL
POTASSIUM SERPL-SCNC: 5 MMOL/L
PROT SERPL-MCNC: 7.4 G/DL
RBC # BLD: 5.05 M/UL
RBC # FLD: 13.9 %
SODIUM SERPL-SCNC: 142 MMOL/L
T3FREE SERPL-MCNC: 2.17 PG/ML
TSH SERPL-ACNC: 2.73 UIU/ML
WBC # FLD AUTO: 7.91 K/UL

## 2021-06-09 ENCOUNTER — OUTPATIENT (OUTPATIENT)
Dept: OUTPATIENT SERVICES | Facility: HOSPITAL | Age: 61
LOS: 1 days | End: 2021-06-09
Payer: COMMERCIAL

## 2021-06-09 ENCOUNTER — APPOINTMENT (OUTPATIENT)
Dept: RADIOLOGY | Facility: CLINIC | Age: 61
End: 2021-06-09
Payer: COMMERCIAL

## 2021-06-09 DIAGNOSIS — M25.50 PAIN IN UNSPECIFIED JOINT: ICD-10-CM

## 2021-06-09 DIAGNOSIS — Z00.8 ENCOUNTER FOR OTHER GENERAL EXAMINATION: ICD-10-CM

## 2021-06-09 DIAGNOSIS — Z98.890 OTHER SPECIFIED POSTPROCEDURAL STATES: Chronic | ICD-10-CM

## 2021-06-09 PROCEDURE — 73110 X-RAY EXAM OF WRIST: CPT

## 2021-06-09 PROCEDURE — 73030 X-RAY EXAM OF SHOULDER: CPT | Mod: 26,LT

## 2021-06-09 PROCEDURE — 73110 X-RAY EXAM OF WRIST: CPT | Mod: 26,50

## 2021-06-09 PROCEDURE — 73030 X-RAY EXAM OF SHOULDER: CPT

## 2021-06-10 LAB
ANION GAP SERPL CALC-SCNC: 12 MMOL/L
BUN SERPL-MCNC: 27 MG/DL
CA-I FLD-SCNC: 1.24 MMOL/L
CALCIUM SERPL-MCNC: 9.8 MG/DL
CALCIUM SERPL-MCNC: 9.8 MG/DL
CHLORIDE SERPL-SCNC: 103 MMOL/L
CO2 SERPL-SCNC: 25 MMOL/L
CREAT SERPL-MCNC: 0.86 MG/DL
PARATHYROID HORMONE INTACT: 32 PG/ML
POTASSIUM SERPL-SCNC: 4.4 MMOL/L
SODIUM SERPL-SCNC: 140 MMOL/L

## 2021-06-16 ENCOUNTER — APPOINTMENT (OUTPATIENT)
Dept: ULTRASOUND IMAGING | Facility: CLINIC | Age: 61
End: 2021-06-16
Payer: COMMERCIAL

## 2021-06-16 ENCOUNTER — RESULT REVIEW (OUTPATIENT)
Age: 61
End: 2021-06-16

## 2021-06-16 ENCOUNTER — OUTPATIENT (OUTPATIENT)
Dept: OUTPATIENT SERVICES | Facility: HOSPITAL | Age: 61
LOS: 1 days | End: 2021-06-16
Payer: COMMERCIAL

## 2021-06-16 DIAGNOSIS — Z98.890 OTHER SPECIFIED POSTPROCEDURAL STATES: Chronic | ICD-10-CM

## 2021-06-16 DIAGNOSIS — Z00.8 ENCOUNTER FOR OTHER GENERAL EXAMINATION: ICD-10-CM

## 2021-06-16 DIAGNOSIS — M25.50 PAIN IN UNSPECIFIED JOINT: ICD-10-CM

## 2021-06-16 PROCEDURE — 76881 US COMPL JOINT R-T W/IMG: CPT

## 2021-06-16 PROCEDURE — 76881 US COMPL JOINT R-T W/IMG: CPT | Mod: 26,RT

## 2021-06-24 ENCOUNTER — OUTPATIENT (OUTPATIENT)
Dept: OUTPATIENT SERVICES | Facility: HOSPITAL | Age: 61
LOS: 1 days | Discharge: HOME | End: 2021-06-24
Payer: COMMERCIAL

## 2021-06-24 VITALS
OXYGEN SATURATION: 99 % | HEIGHT: 65 IN | TEMPERATURE: 98 F | SYSTOLIC BLOOD PRESSURE: 143 MMHG | RESPIRATION RATE: 17 BRPM | DIASTOLIC BLOOD PRESSURE: 83 MMHG | HEART RATE: 81 BPM | WEIGHT: 247.14 LBS

## 2021-06-24 DIAGNOSIS — E03.9 HYPOTHYROIDISM, UNSPECIFIED: ICD-10-CM

## 2021-06-24 DIAGNOSIS — M17.11 UNILATERAL PRIMARY OSTEOARTHRITIS, RIGHT KNEE: ICD-10-CM

## 2021-06-24 DIAGNOSIS — Z01.818 ENCOUNTER FOR OTHER PREPROCEDURAL EXAMINATION: ICD-10-CM

## 2021-06-24 DIAGNOSIS — Z98.890 OTHER SPECIFIED POSTPROCEDURAL STATES: Chronic | ICD-10-CM

## 2021-06-24 DIAGNOSIS — E78.5 HYPERLIPIDEMIA, UNSPECIFIED: ICD-10-CM

## 2021-06-24 DIAGNOSIS — I10 ESSENTIAL (PRIMARY) HYPERTENSION: ICD-10-CM

## 2021-06-24 DIAGNOSIS — E66.9 OBESITY, UNSPECIFIED: ICD-10-CM

## 2021-06-24 LAB
A1C WITH ESTIMATED AVERAGE GLUCOSE RESULT: 5.8 % — HIGH (ref 4–5.6)
ANION GAP SERPL CALC-SCNC: 5 MMOL/L — SIGNIFICANT CHANGE UP (ref 5–17)
APTT BLD: 31.5 SEC — SIGNIFICANT CHANGE UP (ref 27.5–35.5)
BASOPHILS # BLD AUTO: 0.02 K/UL — SIGNIFICANT CHANGE UP (ref 0–0.2)
BASOPHILS NFR BLD AUTO: 0.3 % — SIGNIFICANT CHANGE UP (ref 0–2)
BUN SERPL-MCNC: 27 MG/DL — HIGH (ref 7–23)
CALCIUM SERPL-MCNC: 9 MG/DL — SIGNIFICANT CHANGE UP (ref 8.5–10.1)
CHLORIDE SERPL-SCNC: 107 MMOL/L — SIGNIFICANT CHANGE UP (ref 96–108)
CO2 SERPL-SCNC: 28 MMOL/L — SIGNIFICANT CHANGE UP (ref 22–31)
CREAT SERPL-MCNC: 0.68 MG/DL — SIGNIFICANT CHANGE UP (ref 0.5–1.3)
EOSINOPHIL # BLD AUTO: 0.07 K/UL — SIGNIFICANT CHANGE UP (ref 0–0.5)
EOSINOPHIL NFR BLD AUTO: 1.2 % — SIGNIFICANT CHANGE UP (ref 0–6)
ESTIMATED AVERAGE GLUCOSE: 120 MG/DL — HIGH (ref 68–114)
GLUCOSE SERPL-MCNC: 97 MG/DL — SIGNIFICANT CHANGE UP (ref 70–99)
HCT VFR BLD CALC: 43.1 % — SIGNIFICANT CHANGE UP (ref 34.5–45)
HGB BLD-MCNC: 14.3 G/DL — SIGNIFICANT CHANGE UP (ref 11.5–15.5)
IMM GRANULOCYTES NFR BLD AUTO: 0.7 % — SIGNIFICANT CHANGE UP (ref 0–1.5)
INR BLD: 0.94 RATIO — SIGNIFICANT CHANGE UP (ref 0.88–1.16)
LYMPHOCYTES # BLD AUTO: 1.57 K/UL — SIGNIFICANT CHANGE UP (ref 1–3.3)
LYMPHOCYTES # BLD AUTO: 27.2 % — SIGNIFICANT CHANGE UP (ref 13–44)
MCHC RBC-ENTMCNC: 28.4 PG — SIGNIFICANT CHANGE UP (ref 27–34)
MCHC RBC-ENTMCNC: 33.2 GM/DL — SIGNIFICANT CHANGE UP (ref 32–36)
MCV RBC AUTO: 85.7 FL — SIGNIFICANT CHANGE UP (ref 80–100)
MONOCYTES # BLD AUTO: 0.5 K/UL — SIGNIFICANT CHANGE UP (ref 0–0.9)
MONOCYTES NFR BLD AUTO: 8.7 % — SIGNIFICANT CHANGE UP (ref 2–14)
MRSA PCR RESULT.: SIGNIFICANT CHANGE UP
NEUTROPHILS # BLD AUTO: 3.58 K/UL — SIGNIFICANT CHANGE UP (ref 1.8–7.4)
NEUTROPHILS NFR BLD AUTO: 61.9 % — SIGNIFICANT CHANGE UP (ref 43–77)
NRBC # BLD: 0 /100 WBCS — SIGNIFICANT CHANGE UP (ref 0–0)
PLATELET # BLD AUTO: 275 K/UL — SIGNIFICANT CHANGE UP (ref 150–400)
POTASSIUM SERPL-MCNC: 3.6 MMOL/L — SIGNIFICANT CHANGE UP (ref 3.5–5.3)
POTASSIUM SERPL-SCNC: 3.6 MMOL/L — SIGNIFICANT CHANGE UP (ref 3.5–5.3)
PROTHROM AB SERPL-ACNC: 10.9 SEC — SIGNIFICANT CHANGE UP (ref 10.6–13.6)
RBC # BLD: 5.03 M/UL — SIGNIFICANT CHANGE UP (ref 3.8–5.2)
RBC # FLD: 13.9 % — SIGNIFICANT CHANGE UP (ref 10.3–14.5)
S AUREUS DNA NOSE QL NAA+PROBE: SIGNIFICANT CHANGE UP
SODIUM SERPL-SCNC: 140 MMOL/L — SIGNIFICANT CHANGE UP (ref 135–145)
WBC # BLD: 5.78 K/UL — SIGNIFICANT CHANGE UP (ref 3.8–10.5)
WBC # FLD AUTO: 5.78 K/UL — SIGNIFICANT CHANGE UP (ref 3.8–10.5)

## 2021-06-24 PROCEDURE — 93010 ELECTROCARDIOGRAM REPORT: CPT | Mod: 76

## 2021-06-24 RX ORDER — ALPRAZOLAM 0.25 MG
1 TABLET ORAL
Qty: 0 | Refills: 0 | DISCHARGE

## 2021-06-24 RX ORDER — SODIUM CHLORIDE 9 MG/ML
3 INJECTION INTRAMUSCULAR; INTRAVENOUS; SUBCUTANEOUS EVERY 8 HOURS
Refills: 0 | Status: DISCONTINUED | OUTPATIENT
Start: 2021-07-07 | End: 2021-07-07

## 2021-06-24 NOTE — H&P PST ADULT - NSICDXPASTSURGICALHX_GEN_ALL_CORE_FT
PAST SURGICAL HISTORY:  Hx of cholecystectomy     S/P lumpectomy, left breast     S/P thyroid surgery

## 2021-06-24 NOTE — PHYSICAL THERAPY INITIAL EVALUATION ADULT - LIVES WITH, PROFILE
in a private house. Sister will be available to assist pt in post -op care upon discharge home./alone

## 2021-06-24 NOTE — H&P PST ADULT - NSANTHOSAYNRD_GEN_A_CORE
denies/No. KIMBERLEY screening performed.  STOP BANG Legend: 0-2 = LOW Risk; 3-4 = INTERMEDIATE Risk; 5-8 = HIGH Risk

## 2021-06-24 NOTE — H&P PST ADULT - ASSESSMENT
right knee osteoarthritis   CAPRINI SCORE    AGE RELATED RISK FACTORS                                                       MOBILITY RELATED FACTORS  [ ] Age 41-60 years                                            (1 Point)                  [ ] Bed rest                                                        (1 Point)  [x ] Age: 61-74 years                                           (2 Points)                [ ] Plaster cast                                                   (2 Points)  [ ] Age= 75 years                                              (3 Points)                 [ ] Bed bound for more than 72 hours                   (2 Points)    DISEASE RELATED RISK FACTORS                                               GENDER SPECIFIC FACTORS  [ ] Edema in the lower extremities                       (1 Point)                  [ ] Pregnancy                                                     (1 Point)  [ ] Varicose veins                                               (1 Point)                  [ ] Post-partum < 6 weeks                                   (1 Point)             [ x] BMI > 25 Kg/m2                                            (1 Point)                  [ ] Hormonal therapy  or oral contraception            (1 Point)                 [ ] Sepsis (in the previous month)                        (1 Point)                  [ ] History of pregnancy complications  [ ] Pneumonia or serious lung disease                                               [ ] Unexplained or recurrent                       (1 Point)           (in the previous month)                               (1 Point)  [ ] Abnormal pulmonary function test                     (1 Point)                 SURGERY RELATED RISK FACTORS  [ ] Acute myocardial infarction                              (1 Point)                 [ ]  Section                                            (1 Point)  [ ] Congestive heart failure (in the previous month)  (1 Point)                 [ ] Minor surgery                                                 (1 Point)   [ ] Inflammatory bowel disease                             (1 Point)                 [ ] Arthroscopic surgery                                        (2 Points)  [ ] Central venous access                                    (2 Points)                [ ] General surgery lasting more than 45 minutes   (2 Points)       [ ] Stroke (in the previous month)                          (5 Points)               [x ] Elective arthroplasty                                        (5 Points)                                                                                                                                               HEMATOLOGY RELATED FACTORS                                                 TRAUMA RELATED RISK FACTORS  [x ] Prior episodes of VTE                                     (3 Points)                 [ ] Fracture of the hip, pelvis, or leg                       (5 Points)  [ ] Positive family history for VTE                         (3 Points)                 [ ] Acute spinal cord injury (in the previous month)  (5 Points)  [ ] Prothrombin 32100 A                                      (3 Points)                 [ ] Paralysis  (less than 1 month)                          (5 Points)  [ ] Factor V Leiden                                             (3 Points)                 [ ] Multiple Trauma within 1 month                         (5 Points)  [ ] Lupus anticoagulants                                     (3 Points)                                                           [ ] Anticardiolipin antibodies                                (3 Points)                                                       [ ] High homocysteine in the blood                      (3 Points)                                             [ ] Other congenital or acquired thrombophilia       (3 Points)                                                [ ] Heparin induced thrombocytopenia                  (3 Points)                                          Total Score [       11   ]  Caprini Score 0-2: Low risk, No VTE Prophylaxis required for most patient's, encourage ambulation  Caprini Score 3-6: At Risk, Pharmacologic VTE prophylaxis is indicated for most patients ( in the absence of a contraindication)  Caprini Score Greater than or = 7: High Risk , pharmacologic VTE is indicated for most patients ( in the absence of a contraindication)    Caprini score indicates that the patient is high risk for VTE event ( score 6 or greater). Surgical patient's in this group will benefit from both pharmacologic prophylaxis and intermittent compression devices . Surgical team will determine the balance between VTE  risk and bleeding risk and other clinical considerations

## 2021-06-24 NOTE — H&P PST ADULT - NSICDXFAMILYHX_GEN_ALL_CORE_FT
FAMILY HISTORY:  Sibling  Still living? Unknown  Family history of coronary artery disease, Age at diagnosis: Age Unknown

## 2021-06-24 NOTE — PHYSICAL THERAPY INITIAL EVALUATION ADULT - IMPAIRMENTS CONTRIBUTING TO GAIT DEVIATIONS, PT EVAL
[facility]  Hematology  Bone Marrow Transplant  Progress Note    Patient Name: Wilton Guzmán  Admission Date: 6/16/2018  Hospital Length of Stay: 33 days  Code Status: Full Code    Subjective:     Interval History:  MEC D27. Afebrile. VSS. Continue PPX acyc, cipro, voriconazole. Continue dasatinib.Pancytopenic. .     Objective:     Vital Signs (Most Recent):  Temp: 97.9 °F (36.6 °C) (07/19/18 1140)  Pulse: 82 (07/19/18 1140)  Resp: 19 (07/19/18 1140)  BP: 121/80 (07/19/18 1140)  SpO2: 99 % (07/19/18 1140) Vital Signs (24h Range):  Temp:  [97 °F (36.1 °C)-98.7 °F (37.1 °C)] 97.9 °F (36.6 °C)  Pulse:  [82-98] 82  Resp:  [17-20] 19  SpO2:  [98 %-100 %] 99 %  BP: (103-121)/(55-80) 121/80     Weight: 61.5 kg (135 lb 9.3 oz)  Body mass index is 18.91 kg/m².  Body surface area is 1.76 meters squared.    ECOG SCORE         [unfilled]    Intake/Output - Last 3 Shifts       07/17 0700 - 07/18 0659 07/18 0700 - 07/19 0659 07/19 0700 - 07/20 0659    P.O.  410     Total Intake(mL/kg)  410 (6.7)     Urine (mL/kg/hr) 400 (0.3) 2000 (1.4)     Total Output 400 2000      Net -400 -1590             Stool Occurrence 2 x            Physical Exam   Constitutional: He is oriented to person, place, and time. He appears well-developed and well-nourished.   HENT:   Head: Normocephalic and atraumatic.   Right Ear: External ear normal.   Left Ear: External ear normal.   Mouth/Throat: Oropharynx is clear and moist. No oropharyngeal exudate.   Eyes: EOM are normal. Pupils are equal, round, and reactive to light.   Neck: Normal range of motion. Neck supple.   Cardiovascular: Normal rate and regular rhythm.    No murmur heard.  Pulmonary/Chest: Effort normal and breath sounds normal. No respiratory distress.   Abdominal: Soft. Bowel sounds are normal. He exhibits no distension.   Musculoskeletal: Normal range of motion. He exhibits no edema.   Neurological: He is alert and oriented to person, place, and time.   Skin:  Skin is warm and dry. No rash noted.   Central line c/d/i  Left side of inner buttocks with open reddened wound and white appearing ulceration, improving   Psychiatric: He has a normal mood and affect. His behavior is normal. Judgment and thought content normal.   Vitals reviewed.      Significant Labs:   CBC:   Recent Labs  Lab 07/18/18  0405 07/19/18  0358   WBC 0.64* 0.80*   HGB 7.7* 7.3*   HCT 23.4* 23.0*   PLT 36* 58*    and CMP:   Recent Labs  Lab 07/18/18  0405 07/19/18  0358    142   K 3.8 4.0    108   CO2 24 23   GLU 82 83   BUN 9 7   CREATININE 0.7 0.7   CALCIUM 8.4* 8.7   PROT 5.6* 5.6*   ALBUMIN 2.5* 2.5*   BILITOT 0.2 0.1   ALKPHOS 80 81   AST 14 16   ALT 23 25   ANIONGAP 10 11   EGFRNONAA >60.0 >60.0       Diagnostic Results:  I have reviewed all pertinent imaging results/findings within the past 24 hours.    Assessment/Plan:     * Chronic myelogenous leukemia (CML), BCR-ABL1-positive    - initial WBC reportedly 150,000  - bcr-abl positive on FISH at [facility] (records attached in media tab)  - original BMBX 5/26/18 with 20% blasts; CML transformed to AML with blast crisis;   karyotype: 46,XY,t(9;22;22;14)q34;q11.2;q13;q23); BCR/ABL gene fusion 62%  - continue dasatinib at 40 mg (dose reduction secondary to being on voriconazole)  - voriconazole, acyclovir and cipro antimicrobial ppx  - repeat BM biopsy done 6/18 here at [facility] after 7+3. 9% blasts.   - persistent disease post 7+3 induction. Started on MEC chemotherapy for refractory AML on 6/23/2018, day 27.    - had BMBX on 7/13/18, without evidence of CML; BCR ABL p210 0.5% (c/w MRD)  - AML FISH negative  - NGS negative from first repeat BMBX  - ECHO with 63% EF  - HIV negative, Hepatitis negative, G6PD wnl  - HLA typing hi and lo completed  - referral information for transplant benefits given to patient  - patient has 1 full sibling, contact info given to transplant coordinators  - pt still needs to see PCP to get formal referral; appt set  up with PCP Monday, July 23 in [place]. PCP should be able to refer back to us for services.   - consider sending BCR/ABL mutational analysis testing when WBC increases        Ulcer of perianal area, limited to breakdown of skin    - reddened area with white ulceration noted to inner left side of buttocks  - consulted wound care, appreciate recs   - started critic aid barrier paste and ordered waffle cushion. Pt states this is helping           Severe malnutrition    - albumin and weight was previously dropping  - daily weights  - dietary following             Neutropenic fever    - 7/13/18 Blood cx x2 ngtd; UA negative; Urine cx no growth; Chest xray negative  - Started on  vancomycin ( received 4 days) + cefepime (received 6 days) on 7/13/2018.  - resolved; afebrile off of antibiotics        Anxiety    - Patient understandibly experiencing anxiety about his diagnosis and feeling isolated in his room  - Declined oncology psychologist consult earlier during this admission  - Continue Remeron for anxiety and insomnia            Pancytopenia due to antineoplastic chemotherapy    - transfuse for Hgb <7 and platelets <10K  - continue PPX antimicrobials: acyc, cipro, vori  - no indications for transfusions;             VTE Risk Mitigation         Ordered     heparin, porcine (PF) 100 unit/mL injection flush 300 Units  As needed (PRN)      06/23/18 1052     IP VTE LOW RISK PATIENT  Once      06/16/18 0938          Disposition: inpatient awaiting recovery    Whitney Curiel NP  Bone Marrow Transplant  [facility]       pain

## 2021-06-24 NOTE — PHYSICAL THERAPY INITIAL EVALUATION ADULT - GENERAL OBSERVATIONS, REHAB EVAL
Patient was seen seated  in chair in PT/OT preoperative assessment room with no apparent distress. Height:  5'5"     ; weight :  241   lbs.

## 2021-06-24 NOTE — H&P PST ADULT - NSICDXPASTMEDICALHX_GEN_ALL_CORE_FT
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

## 2021-06-24 NOTE — OCCUPATIONAL THERAPY INITIAL EVALUATION ADULT - ADDITIONAL COMMENTS
Patient lives alone in a private house with 3 steps to enter with bilateral handrails that are far apart. Once inside, the pt main bedroom and bathroom is on that floor when entering. The pts bathroom has a tub/shower combination, fixed shower head, comfort height toilet and grab bars. Pt reports that she has a 3/1 commode at home from her mother. OT educated pt on importance of Patient lives alone in a private house with 3 steps to enter with bilateral handrails that are far apart. Once inside, the pt main bedroom and bathroom is on that floor when entering. The pts bathroom has a tub/shower combination, fixed shower head, comfort height toilet and grab bars. Pt reports that she has a 3/1 commode at home from her mother. OT educated pt on importance of using ones own commode for hygiene purposes. Pt still deferred. Pt ambulates with no device and owns a cane and a pair of crutches. The pt daily pain is a 5/10 at rest and a 8/10 with movement. The pt manages the pain with rest, ice and Tylenol. The pt reports that she take prednisone shots for her RA. The pt reports that she has to take Benadryl with any type of narcotics due to itchiness. Pt has no recent outpatient PT, no recent falls and no buckling of the knees. The pt wears glasses all the time, R handed, drives and has no hearing impairments.

## 2021-06-24 NOTE — H&P PST ADULT - NSICDXPROBLEM_GEN_ALL_CORE_FT
PROBLEM DIAGNOSES  Problem: Osteoarthritis of right knee  Assessment and Plan: scheduled for right knee arthroplasty    Problem: HTN (hypertension)  Assessment and Plan: continue meds      Problem: HLD (hyperlipidemia)  Assessment and Plan: continue meds      Problem: Hypothyroid  Assessment and Plan: continue meds      Problem: Preoperative examination  Assessment and Plan: Assessment and Plan: labs - cbc,pt/ptt,bmp,t&s,nose cx,ekg  M/C required  preop 3 day hibiclens instruction reviewed and given .instructed on if  nose cx positive use mupuricin 5 days and checklist given  take routine meds DOS with sips of water. avoid NSAID and OTC supplements. verbalized understanding  information on proper nutrition , increase protein and better food choices provided in packet  ensure clear  anesthesiologist to review pst labs, ekg, medical clearances and optimization for surgery      Problem: Obese  Assessment and Plan: Intermediate risk for KIMBERLEY identified by screening tool.   Airway precautions

## 2021-06-24 NOTE — PHYSICAL THERAPY INITIAL EVALUATION ADULT - ADDITIONAL COMMENTS
There are 3 steps, c far yenny rails, at the entry of the house and no steps to negotiate at home. Pt has a tub/shower combo c fixed shower head, grab bar, and comfort height toilet seat  in . Pt deferred 3-1 commode and claimed that she inherited a straight cane, standard walker, rollator, and 3-1 commode ,and they are in good working condition, from her mom and pt will use them. Average pain level at rest is 5/10. Pain increases to 8/10 c prolonged standing, ambulation, stairs negotiation, and turning.  Pt takes Tylenol and applies ice for pain. Pt has itchy experience with narcotic pain medication. Pt is not on out-pt physical therapy at present. There is no h/o fall or knee buckling in the past 6 months. Pt wears glasses at all times and no need for hearing aid.    Pt is R handed and drives.

## 2021-06-24 NOTE — OCCUPATIONAL THERAPY INITIAL EVALUATION ADULT - NS ASR OT EQUIP NEEDS DISCH
Recommending 3/1 commode and rolling walker for safe transfers and ADL management. Pt deferred 3/1 commode stating "I have one from my mom. I don't want another one".

## 2021-06-24 NOTE — PHYSICAL THERAPY INITIAL EVALUATION ADULT - PERTINENT HX OF CURRENT PROBLEM, REHAB EVAL
Chronic R knee pain and  limiting functional mobility. Pt is scheduled for elective R knee total joint replacement on  7/7/21 by  Dr. Stoddard.

## 2021-06-24 NOTE — OCCUPATIONAL THERAPY INITIAL EVALUATION ADULT - PERTINENT HX OF CURRENT PROBLEM, REHAB EVAL
R knee OA which impacts pts ability to perform functional tasks/transfers and mobility. Pt is scheduled for R TKR on 7/7/21.

## 2021-06-24 NOTE — H&P PST ADULT - HISTORY OF PRESENT ILLNESS
61 yo female c/o right knee pain secondary to osteoarthritis - schedule for right knee arthroplasty    denies recent travels in the past 30 days. No fever, SOB, cough, flu like symptoms or body rash- covid screen  covid19 vaccine completed

## 2021-06-28 ENCOUNTER — APPOINTMENT (OUTPATIENT)
Dept: INTERNAL MEDICINE | Facility: CLINIC | Age: 61
End: 2021-06-28
Payer: COMMERCIAL

## 2021-06-28 VITALS
DIASTOLIC BLOOD PRESSURE: 70 MMHG | TEMPERATURE: 98.7 F | HEIGHT: 65 IN | BODY MASS INDEX: 39.15 KG/M2 | SYSTOLIC BLOOD PRESSURE: 130 MMHG | WEIGHT: 235 LBS

## 2021-06-28 DIAGNOSIS — K85.90 ACUTE PANCREATITIS WITHOUT NECROSIS OR INFECTION, UNSPECIFIED: ICD-10-CM

## 2021-06-28 PROCEDURE — 99203 OFFICE O/P NEW LOW 30 MIN: CPT

## 2021-06-28 PROCEDURE — 99072 ADDL SUPL MATRL&STAF TM PHE: CPT

## 2021-06-28 RX ORDER — DICLOFENAC SODIUM 100 MG/1
100 TABLET, FILM COATED, EXTENDED RELEASE ORAL
Qty: 30 | Refills: 0 | Status: DISCONTINUED | COMMUNITY
Start: 2019-10-02 | End: 2021-06-28

## 2021-06-28 NOTE — ASSESSMENT
[Patient Optimized for Surgery] : Patient optimized for surgery [FreeTextEntry4] : This is a 60-year-old history has been reviewed above.\par \par Patient has a history of hypertension she is well controlled on amlodipine\par She has a history of hypercholesterolemia well-controlled on simvastatin\par She has a history of recurrent pancreatitis she had a cholecystectomy and she did have pancreas divisum she has had no recent episodes\par She has the history of hypothyroidism she remains on Synthroid her TSHs have been therapeutic\par She has a history of polymyalgia rheumatica she is currently controlled on steroids\par Her laboratory and EKG have been reviewed her EKG shows no changes from her previous tracings laboratory acceptable\par Patient should have perioperative steroids\par No medical contraindications to this procedure

## 2021-06-28 NOTE — PHYSICAL EXAM
[No Joint Swelling] : no joint swelling [No Focal Deficits] : no focal deficits [Normal] : affect was normal and insight and judgment were intact [de-identified] : Overweight

## 2021-06-28 NOTE — HISTORY OF PRESENT ILLNESS
[No Pertinent Cardiac History] : no history of aortic stenosis, atrial fibrillation, coronary artery disease, recent myocardial infarction, or implantable device/pacemaker [No Pertinent Pulmonary History] : no history of asthma, COPD, sleep apnea, or smoking [No Adverse Anesthesia Reaction] : no adverse anesthesia reaction in self or family member [FreeTextEntry1] : Right knee arthroplasty [FreeTextEntry2] : 7/7/21 [FreeTextEntry3] : Dr. Stoddard [FreeTextEntry4] : This is a 60-year-old female for preoperative evaluation prior to right knee surgery surgery\par \par Past medical history is significant for hypertension hypercholesterolemia distant history of a DVT status post surgery recurrent pancreatitis status post cholecystectomy and the relatively new onset of polymyalgia rheumatica\par \par Currently patient feels well with the exception of residual joint pain and muscle pain from her polymyalgia

## 2021-07-01 ENCOUNTER — APPOINTMENT (OUTPATIENT)
Dept: RHEUMATOLOGY | Facility: CLINIC | Age: 61
End: 2021-07-01

## 2021-07-04 ENCOUNTER — APPOINTMENT (OUTPATIENT)
Dept: DISASTER EMERGENCY | Facility: CLINIC | Age: 61
End: 2021-07-04

## 2021-07-06 ENCOUNTER — NON-APPOINTMENT (OUTPATIENT)
Age: 61
End: 2021-07-06

## 2021-07-06 ENCOUNTER — APPOINTMENT (OUTPATIENT)
Dept: OPHTHALMOLOGY | Facility: CLINIC | Age: 61
End: 2021-07-06

## 2021-07-06 ENCOUNTER — TRANSCRIPTION ENCOUNTER (OUTPATIENT)
Age: 61
End: 2021-07-06

## 2021-07-06 ENCOUNTER — APPOINTMENT (OUTPATIENT)
Dept: OPHTHALMOLOGY | Facility: CLINIC | Age: 61
End: 2021-07-06
Payer: COMMERCIAL

## 2021-07-06 PROBLEM — I82.409 ACUTE EMBOLISM AND THROMBOSIS OF UNSPECIFIED DEEP VEINS OF UNSPECIFIED LOWER EXTREMITY: Chronic | Status: ACTIVE | Noted: 2021-06-24

## 2021-07-06 PROBLEM — M06.9 RHEUMATOID ARTHRITIS, UNSPECIFIED: Chronic | Status: ACTIVE | Noted: 2021-06-24

## 2021-07-06 PROCEDURE — 92014 COMPRE OPH EXAM EST PT 1/>: CPT

## 2021-07-06 PROCEDURE — 99072 ADDL SUPL MATRL&STAF TM PHE: CPT

## 2021-07-06 PROCEDURE — 92020 GONIOSCOPY: CPT

## 2021-07-06 RX ORDER — LANOLIN ALCOHOL/MO/W.PET/CERES
3 CREAM (GRAM) TOPICAL AT BEDTIME
Refills: 0 | Status: DISCONTINUED | OUTPATIENT
Start: 2021-07-07 | End: 2021-07-08

## 2021-07-06 RX ORDER — ASCORBIC ACID 60 MG
500 TABLET,CHEWABLE ORAL
Refills: 0 | Status: DISCONTINUED | OUTPATIENT
Start: 2021-07-07 | End: 2021-07-08

## 2021-07-06 RX ORDER — SODIUM CHLORIDE 9 MG/ML
1000 INJECTION INTRAMUSCULAR; INTRAVENOUS; SUBCUTANEOUS
Refills: 0 | Status: DISCONTINUED | OUTPATIENT
Start: 2021-07-07 | End: 2021-07-08

## 2021-07-06 RX ORDER — PANTOPRAZOLE SODIUM 20 MG/1
40 TABLET, DELAYED RELEASE ORAL
Refills: 0 | Status: DISCONTINUED | OUTPATIENT
Start: 2021-07-07 | End: 2021-07-08

## 2021-07-06 RX ORDER — ONDANSETRON 8 MG/1
4 TABLET, FILM COATED ORAL EVERY 6 HOURS
Refills: 0 | Status: DISCONTINUED | OUTPATIENT
Start: 2021-07-07 | End: 2021-07-08

## 2021-07-06 RX ORDER — BENZOCAINE AND MENTHOL 5; 1 G/100ML; G/100ML
1 LIQUID ORAL EVERY 4 HOURS
Refills: 0 | Status: DISCONTINUED | OUTPATIENT
Start: 2021-07-07 | End: 2021-07-08

## 2021-07-06 RX ORDER — AMLODIPINE BESYLATE 2.5 MG/1
10 TABLET ORAL DAILY
Refills: 0 | Status: DISCONTINUED | OUTPATIENT
Start: 2021-07-07 | End: 2021-07-08

## 2021-07-06 RX ORDER — LEVOTHYROXINE SODIUM 125 MCG
175 TABLET ORAL DAILY
Refills: 0 | Status: DISCONTINUED | OUTPATIENT
Start: 2021-07-07 | End: 2021-07-08

## 2021-07-06 RX ORDER — SIMVASTATIN 20 MG/1
20 TABLET, FILM COATED ORAL AT BEDTIME
Refills: 0 | Status: DISCONTINUED | OUTPATIENT
Start: 2021-07-07 | End: 2021-07-08

## 2021-07-06 RX ORDER — SENNA PLUS 8.6 MG/1
2 TABLET ORAL AT BEDTIME
Refills: 0 | Status: DISCONTINUED | OUTPATIENT
Start: 2021-07-07 | End: 2021-07-08

## 2021-07-06 RX ORDER — CELECOXIB 200 MG/1
200 CAPSULE ORAL EVERY 12 HOURS
Refills: 0 | Status: DISCONTINUED | OUTPATIENT
Start: 2021-07-08 | End: 2021-07-08

## 2021-07-06 RX ORDER — MAGNESIUM HYDROXIDE 400 MG/1
30 TABLET, CHEWABLE ORAL DAILY
Refills: 0 | Status: DISCONTINUED | OUTPATIENT
Start: 2021-07-07 | End: 2021-07-08

## 2021-07-07 ENCOUNTER — RESULT REVIEW (OUTPATIENT)
Age: 61
End: 2021-07-07

## 2021-07-07 ENCOUNTER — INPATIENT (INPATIENT)
Facility: HOSPITAL | Age: 61
LOS: 0 days | Discharge: HOME HEALTH | End: 2021-07-08
Attending: ORTHOPAEDIC SURGERY | Admitting: ORTHOPAEDIC SURGERY
Payer: COMMERCIAL

## 2021-07-07 ENCOUNTER — TRANSCRIPTION ENCOUNTER (OUTPATIENT)
Age: 61
End: 2021-07-07

## 2021-07-07 VITALS
OXYGEN SATURATION: 98 % | TEMPERATURE: 99 F | HEART RATE: 96 BPM | SYSTOLIC BLOOD PRESSURE: 147 MMHG | WEIGHT: 235.01 LBS | HEIGHT: 65 IN | DIASTOLIC BLOOD PRESSURE: 81 MMHG | RESPIRATION RATE: 17 BRPM

## 2021-07-07 DIAGNOSIS — Z98.890 OTHER SPECIFIED POSTPROCEDURAL STATES: Chronic | ICD-10-CM

## 2021-07-07 DIAGNOSIS — S89.91XA UNSPECIFIED INJURY OF RIGHT LOWER LEG, INITIAL ENCOUNTER: Chronic | ICD-10-CM

## 2021-07-07 LAB
HCT VFR BLD CALC: 43.9 % — SIGNIFICANT CHANGE UP (ref 34.5–45)
HGB BLD-MCNC: 14.7 G/DL — SIGNIFICANT CHANGE UP (ref 11.5–15.5)
MCHC RBC-ENTMCNC: 28.9 PG — SIGNIFICANT CHANGE UP (ref 27–34)
MCHC RBC-ENTMCNC: 33.5 GM/DL — SIGNIFICANT CHANGE UP (ref 32–36)
MCV RBC AUTO: 86.2 FL — SIGNIFICANT CHANGE UP (ref 80–100)
NRBC # BLD: 0 /100 WBCS — SIGNIFICANT CHANGE UP (ref 0–0)
PLATELET # BLD AUTO: 252 K/UL — SIGNIFICANT CHANGE UP (ref 150–400)
RBC # BLD: 5.09 M/UL — SIGNIFICANT CHANGE UP (ref 3.8–5.2)
RBC # FLD: 13.4 % — SIGNIFICANT CHANGE UP (ref 10.3–14.5)
WBC # BLD: 8.12 K/UL — SIGNIFICANT CHANGE UP (ref 3.8–10.5)
WBC # FLD AUTO: 8.12 K/UL — SIGNIFICANT CHANGE UP (ref 3.8–10.5)

## 2021-07-07 PROCEDURE — 88305 TISSUE EXAM BY PATHOLOGIST: CPT | Mod: 26

## 2021-07-07 PROCEDURE — 73560 X-RAY EXAM OF KNEE 1 OR 2: CPT | Mod: 26,RT

## 2021-07-07 PROCEDURE — 88311 DECALCIFY TISSUE: CPT | Mod: 26

## 2021-07-07 RX ORDER — FENTANYL CITRATE 50 UG/ML
25 INJECTION INTRAVENOUS
Refills: 0 | Status: DISCONTINUED | OUTPATIENT
Start: 2021-07-07 | End: 2021-07-07

## 2021-07-07 RX ORDER — SODIUM CHLORIDE 9 MG/ML
1000 INJECTION, SOLUTION INTRAVENOUS
Refills: 0 | Status: DISCONTINUED | OUTPATIENT
Start: 2021-07-07 | End: 2021-07-07

## 2021-07-07 RX ORDER — ACETAMINOPHEN 500 MG
650 TABLET ORAL ONCE
Refills: 0 | Status: COMPLETED | OUTPATIENT
Start: 2021-07-07 | End: 2021-07-07

## 2021-07-07 RX ORDER — CELECOXIB 200 MG/1
200 CAPSULE ORAL ONCE
Refills: 0 | Status: COMPLETED | OUTPATIENT
Start: 2021-07-07 | End: 2021-07-07

## 2021-07-07 RX ORDER — DEXAMETHASONE 0.5 MG/5ML
10 ELIXIR ORAL ONCE
Refills: 0 | Status: COMPLETED | OUTPATIENT
Start: 2021-07-08 | End: 2021-07-08

## 2021-07-07 RX ORDER — RIVAROXABAN 15 MG-20MG
10 KIT ORAL DAILY
Refills: 0 | Status: DISCONTINUED | OUTPATIENT
Start: 2021-07-08 | End: 2021-07-08

## 2021-07-07 RX ORDER — FENTANYL CITRATE 50 UG/ML
50 INJECTION INTRAVENOUS
Refills: 0 | Status: DISCONTINUED | OUTPATIENT
Start: 2021-07-07 | End: 2021-07-07

## 2021-07-07 RX ORDER — ACETAMINOPHEN 500 MG
1000 TABLET ORAL ONCE
Refills: 0 | Status: COMPLETED | OUTPATIENT
Start: 2021-07-07 | End: 2021-07-07

## 2021-07-07 RX ORDER — METOCLOPRAMIDE HCL 10 MG
10 TABLET ORAL ONCE
Refills: 0 | Status: DISCONTINUED | OUTPATIENT
Start: 2021-07-07 | End: 2021-07-07

## 2021-07-07 RX ORDER — CEFAZOLIN SODIUM 1 G
2000 VIAL (EA) INJECTION EVERY 8 HOURS
Refills: 0 | Status: COMPLETED | OUTPATIENT
Start: 2021-07-07 | End: 2021-07-08

## 2021-07-07 RX ORDER — DIPHENHYDRAMINE HCL 50 MG
25 CAPSULE ORAL EVERY 4 HOURS
Refills: 0 | Status: DISCONTINUED | OUTPATIENT
Start: 2021-07-07 | End: 2021-07-08

## 2021-07-07 RX ORDER — OXYCODONE HYDROCHLORIDE 5 MG/1
5 TABLET ORAL EVERY 4 HOURS
Refills: 0 | Status: DISCONTINUED | OUTPATIENT
Start: 2021-07-07 | End: 2021-07-08

## 2021-07-07 RX ADMIN — SIMVASTATIN 20 MILLIGRAM(S): 20 TABLET, FILM COATED ORAL at 21:45

## 2021-07-07 RX ADMIN — Medication 25 MILLIGRAM(S): at 20:59

## 2021-07-07 RX ADMIN — SODIUM CHLORIDE 125 MILLILITER(S): 9 INJECTION, SOLUTION INTRAVENOUS at 18:15

## 2021-07-07 RX ADMIN — Medication 400 MILLIGRAM(S): at 23:20

## 2021-07-07 RX ADMIN — Medication 650 MILLIGRAM(S): at 13:26

## 2021-07-07 RX ADMIN — OXYCODONE HYDROCHLORIDE 5 MILLIGRAM(S): 5 TABLET ORAL at 20:59

## 2021-07-07 RX ADMIN — CELECOXIB 200 MILLIGRAM(S): 200 CAPSULE ORAL at 13:26

## 2021-07-07 RX ADMIN — SENNA PLUS 2 TABLET(S): 8.6 TABLET ORAL at 21:43

## 2021-07-07 RX ADMIN — OXYCODONE HYDROCHLORIDE 5 MILLIGRAM(S): 5 TABLET ORAL at 22:00

## 2021-07-07 RX ADMIN — Medication 1000 MILLIGRAM(S): at 23:35

## 2021-07-07 RX ADMIN — Medication 100 MILLIGRAM(S): at 23:22

## 2021-07-07 RX ADMIN — SODIUM CHLORIDE 150 MILLILITER(S): 9 INJECTION INTRAMUSCULAR; INTRAVENOUS; SUBCUTANEOUS at 20:59

## 2021-07-07 NOTE — DISCHARGE NOTE PROVIDER - NSDCFUADDINST_GEN_ALL_CORE_FT
Keep BETZY Dressing Clean, Dry and Intact. May shower with BETZY Dressing. Please do not scrub, soak, peel or pick at the BETZY dressing. No creams, lotions, or oils over dressing. May shower and let water run over dressing, no baths. Pat dry once out of shower. Dressing to be removed in 7 days. If dressing is saturated from border to border - may remove and replace with clean dry dressing  Keep BETZY Dressing Clean, Dry and Intact. May shower with BETZY Dressing. Please do not scrub, soak, peel or pick at the BETZY dressing. No creams, lotions, or oils over dressing. May shower and let water run over dressing, no baths. Pat dry once out of shower. Dressing to be removed in 7 days. If dressing is saturated from border to border - may remove and replace with clean dry dressing.    Shower instructions for BETZY Dressing: Turn battery pack off. Twist OFF battery pack before entering shower. Once done with showering. Pat dressing dry. Reconnect and twist ON battery pack after you are dry. Then turn battery pack on.    Keep knee straight while at rest. Elevate the leg as much as possible ("toes above the nose") to help control swelling. Make sure you get up and take a brief walk every two hours to help with circulation and prevent stiffness. Incentive spirometer 10X/hour. Cryocuff to help with pain/inflammation.

## 2021-07-07 NOTE — PHYSICAL THERAPY INITIAL EVALUATION ADULT - LIVES WITH, PROFILE
Patient seen and examined and agree with above except as noted.  Patients imaging, notes, labs, and vitals reviewed by me personally.  Patient feels better but exam still shows some slight dysmetria on left arm and leg.  Complaining of also having muffled hearing suring his dizziness episodes.  Usually symptoms when he tries to get up and walk    Plan  1. Check orthostatic vitals  2. Check B12, folic acid  3. PT/OT Rehab  4. No further stroke work up at this time in a private house. Sister will be available to assist pt in post -op care upon discharge home./alone

## 2021-07-07 NOTE — DISCHARGE NOTE PROVIDER - NSDCMRMEDTOKEN_GEN_ALL_CORE_FT
Norvasc 10 mg oral tablet: 1 tab(s) orally once a day  predniSONE 5 mg oral tablet: 1 tab(s) orally 2 times a day  Synthroid 175 mcg (0.175 mg) oral tablet: 1 tab(s) orally once a day  Zocor 20 mg oral tablet: 1 tab(s) orally once a day (at bedtime)   ascorbic acid 500 mg oral tablet: 1 tab(s) orally 2 times a day  celecoxib 200 mg oral capsule: 1 cap(s) orally every 12 hours MDD:2 tablets  Multiple Vitamins oral tablet: 1 tab(s) orally once a day  Norvasc 10 mg oral tablet: 1 tab(s) orally once a day  oxyCODONE 10 mg oral tablet: 1 tab(s) orally every 4 hours, As needed, pain 6-10 MDD:6 tablets  pantoprazole 40 mg oral delayed release tablet: 1 tab(s) orally once a day (before a meal)  predniSONE 5 mg oral tablet: 1 tab(s) orally 2 times a day  rivaroxaban 10 mg oral tablet: 1 tab(s) orally once a day for 13 days  senna oral tablet: 2 tab(s) orally once a day (at bedtime)  Synthroid 175 mcg (0.175 mg) oral tablet: 1 tab(s) orally once a day  Zocor 20 mg oral tablet: 1 tab(s) orally once a day (at bedtime)

## 2021-07-07 NOTE — DISCHARGE NOTE PROVIDER - NSDCFUADDAPPT_GEN_ALL_CORE_FT
Follow up with your surgeon in two weeks. Call for appointment.  If you need more pain medication, call your surgeon's office.  We Recommend a follow up appointment with your primary care physician for repeat blood work (CBC and BMP) for post hospital discharge follow-up care.  Call your surgeon if you have increased redness/pain/drainage or fever. Return to ER for shortness of breath/calf tenderness. Follow up with your surgeon in two weeks. Call for appointment.    If you need more pain medications, call your surgeon's office. For medication refills or authorizations call [phone]    Call and schedule a follow up appointment with your primary care physician for repeat blood work (CBC and BMP) for post hospital discharge follow-up care.    Call your surgeon if you have increased redness/pain/drainage or fever. Return to ER for shortness of breath/calf tenderness.

## 2021-07-07 NOTE — OCCUPATIONAL THERAPY INITIAL EVALUATION ADULT - ADDITIONAL COMMENTS
Pre-op confirmed: Patient lives alone in a private house with 3 steps to enter with bilateral handrails that are far apart. Once inside, the pt main bedroom and bathroom is on that floor when entering. The pts bathroom has a tub/shower combination, fixed shower head, comfort height toilet and grab bars. Pt reports that she has a 3/1 commode at home from her mother. OT educated pt on importance of using ones own commode for hygiene purposes. Pt still deferred. Pt ambulates with no device and owns a cane and a pair of crutches. The pt wears glasses all the time, R handed, drives and has no hearing impairments.

## 2021-07-07 NOTE — PHYSICAL THERAPY INITIAL EVALUATION ADULT - ACTIVE RANGE OF MOTION EXAMINATION, REHAB EVAL
R knee ROM flexion 70 degrees, extension - 10 degrees./Left LE Active ROM was WFL (within functional limits)/deficits as listed below

## 2021-07-07 NOTE — PHYSICAL THERAPY INITIAL EVALUATION ADULT - ADDITIONAL COMMENTS
Pre-op verified and confirmed: There are 3 steps, c far yenny rails, at the entry of the house and no steps to negotiate at home. Pt has a tub/shower combo c fixed shower head, grab bar, and comfort height toilet seat  in BR. Pt deferred 3-1 commode and claimed that she inherited a straight cane, standard walker, rollator, and 3-1 commode ,and they are in good working condition, from her mom and pt will use them.  Pt wears glasses at all times and no need for hearing aid.    Pt is R handed and drives.

## 2021-07-07 NOTE — OCCUPATIONAL THERAPY INITIAL EVALUATION ADULT - GENERAL OBSERVATIONS, REHAB EVAL
Chart reviewed and event noted to date. Patient encountered supine in bed, NAD, A&Ox4, WBAT RLE s/p R TKA, +pneumatic TEDs, +cryocuff, BETZY dressing, +ace wrap on R knee C/D/I. Patient had no c/o pain, cooperative and pleasant w/sister bedside.

## 2021-07-07 NOTE — PHYSICAL THERAPY INITIAL EVALUATION ADULT - CRITERIA FOR SKILLED THERAPEUTIC INTERVENTIONS
Home with home PT/impairments found/functional limitations in following categories/risk reduction/prevention/rehab potential/therapy frequency/predicted duration of therapy intervention/anticipated discharge recommendation

## 2021-07-07 NOTE — PATIENT PROFILE ADULT - NSASFALLNEEDSASSIST_GEN_A_NUR
4 month old M ex primie here for 2 episodes of vomiting after drinking concentrated formula. 1 episode of diarrhea. no

## 2021-07-07 NOTE — DISCHARGE NOTE PROVIDER - HOSPITAL COURSE
60yFemale with history of Right Knee Osteoarthritis presenting for Right TKA by Dr. Stoddard on 7/7/21. Risk and benefits of surgery were explained to the patient. The patient understood and agreed to proceed with surgery. Patient underwent the procedure with no intraoperative complications. Pt was brought in stable condition to the PACU. Once stable in PACU, pt was brought to the floor. During hospital stay pt was followed by Medicine, physical therapy, Home Care during this admission. Pt had an uneventful hospital course. Pt is stable for discharge to home 60yFemale with history of OA presenting for R TKA by Dr. Stoddard on 7/8/2021. Risk and benefits of surgery were explained to the patient. The patient understood and agreed to proceed with surgery. Patient underwent the procedure with no intraoperative complications. Pt was brought in stable condition to the PACU. Once stable in PACU, pt was brought to the floor. Post-op CPAP and monitoring of pulse-ox and end-tidal C02. During hospital stay pt was followed by Medicine, physical therapy, Home Care during this admission. Pt had an uneventful hospital course. Pt is stable for discharge to home

## 2021-07-07 NOTE — PHYSICAL THERAPY INITIAL EVALUATION ADULT - RANGE OF MOTION EXAMINATION, REHAB EVAL
R knee ROM flexion 70 degrees, extension - 10 degrees./Left LE ROM was WFL (within functional limits)/deficits as listed below

## 2021-07-07 NOTE — PHYSICAL THERAPY INITIAL EVALUATION ADULT - ASR EQUIP NEEDS DISCH PT EVAL
has received rolling walker, owns a cane , standard walker, rollator, 3:1 commode./rolling walker (5 inch wheels)

## 2021-07-07 NOTE — DISCHARGE NOTE PROVIDER - CARE PROVIDER_API CALL
Selvin Stoddard)  Orthopaedic Surgery  [address] [place], [place] [address]  Phone: [phone]  Fax: [phone]  Follow Up Time:

## 2021-07-07 NOTE — PHYSICAL THERAPY INITIAL EVALUATION ADULT - GAIT TRAINING, PT EVAL
Independent in ambulation with use of cane device up to 300 feet observing proper gait pattern, posture and use of walking device safely.

## 2021-07-07 NOTE — PHYSICAL THERAPY INITIAL EVALUATION ADULT - STRENGTHENING, PT EVAL
Improve strength in B LE to 5/5  and be able to perform functional tasks-bed mobility, sitting, standing, transfers and ambulate in a safe manner with or without  assistive device and prevent falls.

## 2021-07-07 NOTE — OCCUPATIONAL THERAPY INITIAL EVALUATION ADULT - RANGE OF MOTION, PT EVAL
Patient will demonstrate  increase AROM in right knee to WFL in order to perform toilet transfer independently within 3 weeks

## 2021-07-07 NOTE — PHYSICAL THERAPY INITIAL EVALUATION ADULT - GENERAL OBSERVATIONS, REHAB EVAL
Chart reviewed, pt encountered on supine, AxOx4, + BETZY, + IV removed before ambulation, cooperative, sister by bedside.

## 2021-07-07 NOTE — PHYSICAL THERAPY INITIAL EVALUATION ADULT - PLANNED THERAPY INTERVENTIONS, PT EVAL
Independent in stair climbing using one or two rails and/or using appropriate walking device safely./balance training/bed mobility training/gait training/ROM/strengthening/transfer training

## 2021-07-07 NOTE — PHYSICAL THERAPY INITIAL EVALUATION ADULT - IMPAIRMENTS FOUND, PT EVAL
aerobic capacity/endurance/gait, locomotion, and balance/integumentary integrity/joint integrity and mobility/muscle strength/posture/ROM

## 2021-07-08 ENCOUNTER — TRANSCRIPTION ENCOUNTER (OUTPATIENT)
Age: 61
End: 2021-07-08

## 2021-07-08 VITALS
OXYGEN SATURATION: 98 % | RESPIRATION RATE: 18 BRPM | DIASTOLIC BLOOD PRESSURE: 57 MMHG | SYSTOLIC BLOOD PRESSURE: 128 MMHG | HEART RATE: 92 BPM

## 2021-07-08 LAB
ANION GAP SERPL CALC-SCNC: 6 MMOL/L — SIGNIFICANT CHANGE UP (ref 5–17)
BUN SERPL-MCNC: 13 MG/DL — SIGNIFICANT CHANGE UP (ref 7–23)
CALCIUM SERPL-MCNC: 8.3 MG/DL — LOW (ref 8.5–10.1)
CHLORIDE SERPL-SCNC: 102 MMOL/L — SIGNIFICANT CHANGE UP (ref 96–108)
CO2 SERPL-SCNC: 29 MMOL/L — SIGNIFICANT CHANGE UP (ref 22–31)
CREAT SERPL-MCNC: 0.63 MG/DL — SIGNIFICANT CHANGE UP (ref 0.5–1.3)
GLUCOSE SERPL-MCNC: 121 MG/DL — HIGH (ref 70–99)
HCT VFR BLD CALC: 39.5 % — SIGNIFICANT CHANGE UP (ref 34.5–45)
HGB BLD-MCNC: 12.9 G/DL — SIGNIFICANT CHANGE UP (ref 11.5–15.5)
MCHC RBC-ENTMCNC: 28.4 PG — SIGNIFICANT CHANGE UP (ref 27–34)
MCHC RBC-ENTMCNC: 32.7 GM/DL — SIGNIFICANT CHANGE UP (ref 32–36)
MCV RBC AUTO: 86.8 FL — SIGNIFICANT CHANGE UP (ref 80–100)
NRBC # BLD: 0 /100 WBCS — SIGNIFICANT CHANGE UP (ref 0–0)
PLATELET # BLD AUTO: 222 K/UL — SIGNIFICANT CHANGE UP (ref 150–400)
POTASSIUM SERPL-MCNC: 3.6 MMOL/L — SIGNIFICANT CHANGE UP (ref 3.5–5.3)
POTASSIUM SERPL-SCNC: 3.6 MMOL/L — SIGNIFICANT CHANGE UP (ref 3.5–5.3)
RBC # BLD: 4.55 M/UL — SIGNIFICANT CHANGE UP (ref 3.8–5.2)
RBC # FLD: 13.6 % — SIGNIFICANT CHANGE UP (ref 10.3–14.5)
SODIUM SERPL-SCNC: 137 MMOL/L — SIGNIFICANT CHANGE UP (ref 135–145)
WBC # BLD: 7.4 K/UL — SIGNIFICANT CHANGE UP (ref 3.8–10.5)
WBC # FLD AUTO: 7.4 K/UL — SIGNIFICANT CHANGE UP (ref 3.8–10.5)

## 2021-07-08 RX ORDER — SENNA PLUS 8.6 MG/1
2 TABLET ORAL
Qty: 0 | Refills: 0 | DISCHARGE
Start: 2021-07-08

## 2021-07-08 RX ORDER — RIVAROXABAN 15 MG-20MG
1 KIT ORAL
Qty: 13 | Refills: 0
Start: 2021-07-08 | End: 2021-07-20

## 2021-07-08 RX ORDER — PANTOPRAZOLE SODIUM 20 MG/1
1 TABLET, DELAYED RELEASE ORAL
Qty: 30 | Refills: 0
Start: 2021-07-08 | End: 2021-08-06

## 2021-07-08 RX ORDER — ASCORBIC ACID 60 MG
1 TABLET,CHEWABLE ORAL
Qty: 0 | Refills: 0 | DISCHARGE
Start: 2021-07-08

## 2021-07-08 RX ORDER — OXYCODONE HYDROCHLORIDE 5 MG/1
1 TABLET ORAL
Qty: 42 | Refills: 0
Start: 2021-07-08 | End: 2021-07-14

## 2021-07-08 RX ORDER — OXYCODONE HYDROCHLORIDE 5 MG/1
5 TABLET ORAL EVERY 4 HOURS
Refills: 0 | Status: DISCONTINUED | OUTPATIENT
Start: 2021-07-08 | End: 2021-07-08

## 2021-07-08 RX ORDER — OXYCODONE HYDROCHLORIDE 5 MG/1
10 TABLET ORAL EVERY 4 HOURS
Refills: 0 | Status: DISCONTINUED | OUTPATIENT
Start: 2021-07-08 | End: 2021-07-08

## 2021-07-08 RX ORDER — CELECOXIB 200 MG/1
1 CAPSULE ORAL
Qty: 60 | Refills: 0
Start: 2021-07-08 | End: 2021-08-06

## 2021-07-08 RX ADMIN — OXYCODONE HYDROCHLORIDE 10 MILLIGRAM(S): 5 TABLET ORAL at 08:18

## 2021-07-08 RX ADMIN — Medication 10 MILLIGRAM(S): at 06:16

## 2021-07-08 RX ADMIN — PANTOPRAZOLE SODIUM 40 MILLIGRAM(S): 20 TABLET, DELAYED RELEASE ORAL at 07:47

## 2021-07-08 RX ADMIN — Medication 25 MILLIGRAM(S): at 08:18

## 2021-07-08 RX ADMIN — Medication 100 MILLIGRAM(S): at 07:47

## 2021-07-08 RX ADMIN — CELECOXIB 200 MILLIGRAM(S): 200 CAPSULE ORAL at 07:16

## 2021-07-08 RX ADMIN — OXYCODONE HYDROCHLORIDE 5 MILLIGRAM(S): 5 TABLET ORAL at 04:40

## 2021-07-08 RX ADMIN — Medication 5 MILLIGRAM(S): at 06:16

## 2021-07-08 RX ADMIN — Medication 25 MILLIGRAM(S): at 03:41

## 2021-07-08 RX ADMIN — Medication 1 TABLET(S): at 11:33

## 2021-07-08 RX ADMIN — Medication 500 MILLIGRAM(S): at 06:16

## 2021-07-08 RX ADMIN — OXYCODONE HYDROCHLORIDE 10 MILLIGRAM(S): 5 TABLET ORAL at 15:30

## 2021-07-08 RX ADMIN — RIVAROXABAN 10 MILLIGRAM(S): KIT at 11:33

## 2021-07-08 RX ADMIN — OXYCODONE HYDROCHLORIDE 10 MILLIGRAM(S): 5 TABLET ORAL at 09:18

## 2021-07-08 RX ADMIN — Medication 175 MICROGRAM(S): at 06:16

## 2021-07-08 RX ADMIN — OXYCODONE HYDROCHLORIDE 10 MILLIGRAM(S): 5 TABLET ORAL at 15:04

## 2021-07-08 RX ADMIN — AMLODIPINE BESYLATE 10 MILLIGRAM(S): 2.5 TABLET ORAL at 06:16

## 2021-07-08 RX ADMIN — CELECOXIB 200 MILLIGRAM(S): 200 CAPSULE ORAL at 06:16

## 2021-07-08 RX ADMIN — OXYCODONE HYDROCHLORIDE 5 MILLIGRAM(S): 5 TABLET ORAL at 03:40

## 2021-07-08 NOTE — PROGRESS NOTE ADULT - SUBJECTIVE AND OBJECTIVE BOX
DANY HENDERSON is a 60y Female s/p RIGHT TOTAL KNEE REPLACEMENT        denies any chest pain shortness of breath palpitation dizziness lightheadedness nausea vomiting fever or chills    T(C): 36.7 (07-08-21 @ 09:40), Max: 37.4 (07-07-21 @ 13:11)  HR: 82 (07-08-21 @ 09:40) (76 - 96)  BP: 121/76 (07-08-21 @ 09:40) (103/65 - 147/81)  RR: 16 (07-08-21 @ 09:40) (15 - 19)  SpO2: 96% (07-08-21 @ 09:40) (95% - 100%)  no jvd/bruit  s1 s2 rrr  cta  s/nt/nd  no calf tend                        12.9   7.40  )-----------( 222      ( 08 Jul 2021 06:29 )             39.5   07-08    137  |  102  |  13  ----------------------------<  121<H>  3.6   |  29  |  0.63    Ca    8.3<L>      08 Jul 2021 06:29        cont dvt px  pain control  bowel regimen  antiemetics  incentive spirometer
Post-op Check   POD#0 S/P Right TKA   60yFemale Patient seen and examined, Pain controlled  Patient Denies SOB, CP, N/V/D       PE: Right Knee/LE: Dressing C/D/I, Sensation/motor intact, DP 2+, FROM ankle/toes   B/L LE: Skin intact. +ROM hip/knee/ankle/toes. Ankle Dorsi/plantarflexion: 5/5. Calf: soft, compressible and nontender. DP/PT 2+ NVI.     LLE: dressing c/d/i. +ROM ankle/toes. Calf Soft, Compressible and Nontender. DP 2+,  NVI.                           14.7   8.12  )-----------( 252      ( 07 Jul 2021 18:14 )             43.9                 A: As above   P: Pain Control       DVT Prophylaxis      Incentive spirometry      PT WBAT RLE      Isometric exercises      Discharge Planning      All the above discussed and understood by pt       Ortho to F/U 
60yFemale s/p R TKA POD#1. Pt seen and examined in NAD. Pain controlled. Pt denies any new complaints. Pt denies CP/SOB/N/V/D/numbness/tingling/bowel or bladder dysfunction. (+) voids, (+)tolerating PO diet (+)flatus    PE:   RLE: dressing c/d/i. +ROM ankle/toes. Calf: soft, compressible and nontender. DP/PT 2+ NVI.                           12.9   7.40  )-----------( 222      ( 08 Jul 2021 06:29 )             39.5       07-08    137  |  102  |  13  ----------------------------<  121<H>  3.6   |  29  |  0.63    Ca    8.3<L>      08 Jul 2021 06:29          A/P: 60yFemale s/p R TKA POD#1  Pain control prn  PT: WBAT   DVT ppx: SCDs and Xarelto  Wound care, Isometric exercises, incentive spirometry   Discharge: planning Home  All the above discussed and understood by pt

## 2021-07-08 NOTE — DISCHARGE NOTE NURSING/CASE MANAGEMENT/SOCIAL WORK - PATIENT PORTAL LINK FT
You can access the FollowMyHealth Patient Portal offered by [facility] by registering at the following website: [email or web address]. By joining [facility]’s FollowMyHealth portal, you will also be able to view your health information using other applications (apps) compatible with our system.

## 2021-07-08 NOTE — DISCHARGE NOTE NURSING/CASE MANAGEMENT/SOCIAL WORK - NSDCFUADDAPPT_GEN_ALL_CORE_FT
Follow up with your surgeon in two weeks. Call for appointment.    If you need more pain medications, call your surgeon's office. For medication refills or authorizations call [phone]    Call and schedule a follow up appointment with your primary care physician for repeat blood work (CBC and BMP) for post hospital discharge follow-up care.    Call your surgeon if you have increased redness/pain/drainage or fever. Return to ER for shortness of breath/calf tenderness.

## 2021-07-09 ENCOUNTER — NON-APPOINTMENT (OUTPATIENT)
Age: 61
End: 2021-07-09

## 2021-07-12 LAB — SURGICAL PATHOLOGY STUDY: SIGNIFICANT CHANGE UP

## 2021-07-15 DIAGNOSIS — E78.5 HYPERLIPIDEMIA, UNSPECIFIED: ICD-10-CM

## 2021-07-15 DIAGNOSIS — E66.9 OBESITY, UNSPECIFIED: ICD-10-CM

## 2021-07-15 DIAGNOSIS — Z90.49 ACQUIRED ABSENCE OF OTHER SPECIFIED PARTS OF DIGESTIVE TRACT: ICD-10-CM

## 2021-07-15 DIAGNOSIS — M06.9 RHEUMATOID ARTHRITIS, UNSPECIFIED: ICD-10-CM

## 2021-07-15 DIAGNOSIS — F41.9 ANXIETY DISORDER, UNSPECIFIED: ICD-10-CM

## 2021-07-15 DIAGNOSIS — I10 ESSENTIAL (PRIMARY) HYPERTENSION: ICD-10-CM

## 2021-07-15 DIAGNOSIS — C50.919 MALIGNANT NEOPLASM OF UNSPECIFIED SITE OF UNSPECIFIED FEMALE BREAST: ICD-10-CM

## 2021-07-15 DIAGNOSIS — Z82.49 FAMILY HISTORY OF ISCHEMIC HEART DISEASE AND OTHER DISEASES OF THE CIRCULATORY SYSTEM: ICD-10-CM

## 2021-07-15 DIAGNOSIS — M17.11 UNILATERAL PRIMARY OSTEOARTHRITIS, RIGHT KNEE: ICD-10-CM

## 2021-10-07 ENCOUNTER — APPOINTMENT (OUTPATIENT)
Dept: OPHTHALMOLOGY | Facility: CLINIC | Age: 61
End: 2021-10-07

## 2021-11-07 NOTE — PHYSICAL THERAPY INITIAL EVALUATION ADULT - LEVEL OF CONSCIOUSNESS, REHAB EVAL
alert [Nasal Discharge] : nasal discharge [Nasal Congestion] : nasal congestion [Cough] : cough [Negative] : Genitourinary

## 2021-12-04 ENCOUNTER — RX RENEWAL (OUTPATIENT)
Age: 61
End: 2021-12-04

## 2022-01-06 ENCOUNTER — APPOINTMENT (OUTPATIENT)
Dept: RHEUMATOLOGY | Facility: CLINIC | Age: 62
End: 2022-01-06

## 2022-01-20 ENCOUNTER — APPOINTMENT (OUTPATIENT)
Dept: RHEUMATOLOGY | Facility: CLINIC | Age: 62
End: 2022-01-20

## 2022-02-15 ENCOUNTER — APPOINTMENT (OUTPATIENT)
Dept: RHEUMATOLOGY | Facility: CLINIC | Age: 62
End: 2022-02-15
Payer: COMMERCIAL

## 2022-02-15 VITALS
SYSTOLIC BLOOD PRESSURE: 143 MMHG | TEMPERATURE: 97.9 F | BODY MASS INDEX: 40.15 KG/M2 | HEIGHT: 65 IN | DIASTOLIC BLOOD PRESSURE: 89 MMHG | WEIGHT: 241 LBS | OXYGEN SATURATION: 98 % | HEART RATE: 74 BPM

## 2022-02-15 PROCEDURE — 99214 OFFICE O/P EST MOD 30 MIN: CPT

## 2022-02-17 LAB
ALBUMIN SERPL ELPH-MCNC: 5 G/DL
ALP BLD-CCNC: 85 U/L
ALT SERPL-CCNC: 27 U/L
ANION GAP SERPL CALC-SCNC: 15 MMOL/L
AST SERPL-CCNC: 36 U/L
BASOPHILS # BLD AUTO: 0.03 K/UL
BASOPHILS NFR BLD AUTO: 0.4 %
BILIRUB SERPL-MCNC: 0.4 MG/DL
BUN SERPL-MCNC: 21 MG/DL
CALCIUM SERPL-MCNC: 10.5 MG/DL
CHLORIDE SERPL-SCNC: 101 MMOL/L
CO2 SERPL-SCNC: 23 MMOL/L
CREAT SERPL-MCNC: 0.93 MG/DL
CRP SERPL-MCNC: 3 MG/L
EOSINOPHIL # BLD AUTO: 0.03 K/UL
EOSINOPHIL NFR BLD AUTO: 0.4 %
ERYTHROCYTE [SEDIMENTATION RATE] IN BLOOD BY WESTERGREN METHOD: 10 MM/HR
HCT VFR BLD CALC: 47.1 %
HGB BLD-MCNC: 15.3 G/DL
IMM GRANULOCYTES NFR BLD AUTO: 0.4 %
LYMPHOCYTES # BLD AUTO: 1.21 K/UL
LYMPHOCYTES NFR BLD AUTO: 16.1 %
MAN DIFF?: NORMAL
MCHC RBC-ENTMCNC: 27.9 PG
MCHC RBC-ENTMCNC: 32.5 GM/DL
MCV RBC AUTO: 85.9 FL
MONOCYTES # BLD AUTO: 0.47 K/UL
MONOCYTES NFR BLD AUTO: 6.3 %
NEUTROPHILS # BLD AUTO: 5.75 K/UL
NEUTROPHILS NFR BLD AUTO: 76.4 %
PLATELET # BLD AUTO: 327 K/UL
POTASSIUM SERPL-SCNC: 4.8 MMOL/L
PROT SERPL-MCNC: 7.2 G/DL
RBC # BLD: 5.48 M/UL
RBC # FLD: 14.6 %
SODIUM SERPL-SCNC: 139 MMOL/L
WBC # FLD AUTO: 7.52 K/UL

## 2022-03-04 ENCOUNTER — RX RENEWAL (OUTPATIENT)
Age: 62
End: 2022-03-04

## 2022-04-11 PROBLEM — Z11.59 SCREENING FOR VIRAL DISEASE: Status: ACTIVE | Noted: 2020-06-02

## 2022-04-22 ENCOUNTER — RX RENEWAL (OUTPATIENT)
Age: 62
End: 2022-04-22

## 2022-05-10 ENCOUNTER — APPOINTMENT (OUTPATIENT)
Dept: ORTHOPEDIC SURGERY | Facility: CLINIC | Age: 62
End: 2022-05-10
Payer: COMMERCIAL

## 2022-05-10 VITALS — BODY MASS INDEX: 40.15 KG/M2 | HEIGHT: 65 IN | WEIGHT: 241 LBS

## 2022-05-10 PROCEDURE — 99214 OFFICE O/P EST MOD 30 MIN: CPT | Mod: 25

## 2022-05-10 PROCEDURE — 20610 DRAIN/INJ JOINT/BURSA W/O US: CPT

## 2022-05-10 PROCEDURE — J3490M: CUSTOM

## 2022-05-10 NOTE — ASSESSMENT
[FreeTextEntry1] : She is given a cortisone injection today.  She is advised rest and ice.  f/u with Dr Richi negron.

## 2022-05-10 NOTE — HISTORY OF PRESENT ILLNESS
[7] : 7 [Dull/Aching] : dull/aching [Localized] : localized [Full time] : Work status: full time [de-identified] : 61 YF with left knee pain.  H/O OA both knees, s/p right TKA by Dr Stoddard, doing well.  No injury.  She would like to try a cortisone injection today. [] : Post Surgical Visit: no [FreeTextEntry2] : left knee [FreeTextEntry5] : no injury

## 2022-05-10 NOTE — PHYSICAL EXAM
[Left] : left knee [NL (0)] : extension 0 degrees [5___] : hamstring 5[unfilled]/5 [] : non-antalgic [TWNoteComboBox7] : flexion 125 degrees

## 2022-05-10 NOTE — HISTORY OF PRESENT ILLNESS
[7] : 7 [Dull/Aching] : dull/aching [Localized] : localized [Full time] : Work status: full time [de-identified] : 61 YF with left knee pain.  H/O OA both knees, s/p right TKA by Dr Stoddard, doing well.  No injury.  She would like to try a cortisone injection today. [] : Post Surgical Visit: no [FreeTextEntry2] : left knee [FreeTextEntry5] : no injury

## 2022-05-10 NOTE — PROCEDURE
[Large Joint Injection] : Large joint injection [Left] : of the left [Knee] : knee [___ cc    0.25%] : Bupivacaine (Marcaine) ~Vcc of 0.25%  [___ cc    80mg] : Methylprednisolone (Depomedrol) ~Vcc of 80 mg  [Call if redness, pain or fever occur] : call if redness, pain or fever occur [Apply ice for 15min out of every hour for the next 12-24 hours as tolerated] : apply ice for 15 minutes out of every hour for the next 12-24 hours as tolerated [Patient was advised to rest the joint(s) for ____ days] : patient was advised to rest the joint(s) for [unfilled] days [Previous OTC use and PT nontherapeutic] : patient has tried OTC's including aspirin, Ibuprofen, Aleve, etc or prescription NSAIDS, and/or exercises at home and/or physical therapy without satisfactory response [Patient had decreased mobility in the joint] : patient had decreased mobility in the joint [Risks, benefits, alternatives discussed / Verbal consent obtained] : the risks benefits, and alternatives have been discussed, and verbal consent was obtained

## 2022-06-14 ENCOUNTER — APPOINTMENT (OUTPATIENT)
Dept: RHEUMATOLOGY | Facility: CLINIC | Age: 62
End: 2022-06-14
Payer: COMMERCIAL

## 2022-06-14 VITALS
DIASTOLIC BLOOD PRESSURE: 85 MMHG | OXYGEN SATURATION: 97 % | RESPIRATION RATE: 15 BRPM | TEMPERATURE: 97.1 F | HEIGHT: 65 IN | HEART RATE: 96 BPM | BODY MASS INDEX: 42.32 KG/M2 | WEIGHT: 254 LBS | SYSTOLIC BLOOD PRESSURE: 131 MMHG

## 2022-06-14 PROCEDURE — 99214 OFFICE O/P EST MOD 30 MIN: CPT

## 2022-06-20 LAB
ALBUMIN SERPL ELPH-MCNC: 5 G/DL
ALP BLD-CCNC: 96 U/L
ALT SERPL-CCNC: 27 U/L
ANION GAP SERPL CALC-SCNC: 16 MMOL/L
AST SERPL-CCNC: 22 U/L
BASOPHILS # BLD AUTO: 0.03 K/UL
BASOPHILS NFR BLD AUTO: 0.4 %
BILIRUB SERPL-MCNC: 0.4 MG/DL
BUN SERPL-MCNC: 28 MG/DL
CALCIUM SERPL-MCNC: 10.5 MG/DL
CHLORIDE SERPL-SCNC: 100 MMOL/L
CO2 SERPL-SCNC: 24 MMOL/L
CREAT SERPL-MCNC: 1.18 MG/DL
CRP SERPL-MCNC: 5 MG/L
EGFR: 52 ML/MIN/1.73M2
EOSINOPHIL # BLD AUTO: 0.06 K/UL
EOSINOPHIL NFR BLD AUTO: 0.8 %
ERYTHROCYTE [SEDIMENTATION RATE] IN BLOOD BY WESTERGREN METHOD: 20 MM/HR
HCT VFR BLD CALC: 46.5 %
HGB BLD-MCNC: 15.2 G/DL
IMM GRANULOCYTES NFR BLD AUTO: 0.4 %
LYMPHOCYTES # BLD AUTO: 1.16 K/UL
LYMPHOCYTES NFR BLD AUTO: 15 %
MAN DIFF?: NORMAL
MCHC RBC-ENTMCNC: 28.2 PG
MCHC RBC-ENTMCNC: 32.7 GM/DL
MCV RBC AUTO: 86.3 FL
MONOCYTES # BLD AUTO: 0.61 K/UL
MONOCYTES NFR BLD AUTO: 7.9 %
NEUTROPHILS # BLD AUTO: 5.86 K/UL
NEUTROPHILS NFR BLD AUTO: 75.5 %
PLATELET # BLD AUTO: 266 K/UL
POTASSIUM SERPL-SCNC: 4.5 MMOL/L
PROT SERPL-MCNC: 7.7 G/DL
RBC # BLD: 5.39 M/UL
RBC # FLD: 13.6 %
SODIUM SERPL-SCNC: 139 MMOL/L
TSH SERPL-ACNC: 1.07 UIU/ML
WBC # FLD AUTO: 7.75 K/UL

## 2022-07-06 NOTE — PATIENT PROFILE ADULT - DOMESTIC TRAVEL HIGH RISK QUESTION
Skyrizi Pregnancy And Lactation Text: The risk during pregnancy and breastfeeding is uncertain with this medication. No

## 2022-07-12 ENCOUNTER — APPOINTMENT (OUTPATIENT)
Dept: OPHTHALMOLOGY | Facility: CLINIC | Age: 62
End: 2022-07-12

## 2022-07-12 ENCOUNTER — NON-APPOINTMENT (OUTPATIENT)
Age: 62
End: 2022-07-12

## 2022-07-12 PROCEDURE — 92020 GONIOSCOPY: CPT

## 2022-07-12 PROCEDURE — 92012 INTRM OPH EXAM EST PATIENT: CPT

## 2022-09-08 NOTE — PATIENT PROFILE ADULT - SURGICAL SITE INCISION
Detail Level: Detailed Anesthesia Type: 1% lidocaine with epinephrine Anesthesia Volume In Cc: 1.7 Estimated Blood Loss (Optional): minimal no

## 2022-09-23 ENCOUNTER — APPOINTMENT (OUTPATIENT)
Dept: INTERNAL MEDICINE | Facility: CLINIC | Age: 62
End: 2022-09-23

## 2022-09-23 ENCOUNTER — LABORATORY RESULT (OUTPATIENT)
Age: 62
End: 2022-09-23

## 2022-09-23 VITALS
WEIGHT: 248 LBS | SYSTOLIC BLOOD PRESSURE: 120 MMHG | BODY MASS INDEX: 39.86 KG/M2 | DIASTOLIC BLOOD PRESSURE: 70 MMHG | HEIGHT: 66 IN

## 2022-09-23 PROCEDURE — 99213 OFFICE O/P EST LOW 20 MIN: CPT

## 2022-09-23 NOTE — ASSESSMENT
[FreeTextEntry1] : This is a 61-year-old female whose history has been reviewed above\par \par She has long history of polymyalgia hypertension and being overweight\par \par The presentation of the this syndrome does not really mimic sinus pain and when she has multiple abscesses.  She has no fever chills purulent sputum and she did not respond to antibiotics\par \par She does have some TMJ but no real masticatory claudication\par \par My feeling is that this is either migrainous (maxillary facial pain syndrome) or a manifestation of her polymyalgia\par \par In addition she may be having reflux\par \par I will start her on a PPI I have referred her to ENT and I asked her to call her rheumatologist ASAP as per the symptoms

## 2022-09-23 NOTE — PHYSICAL EXAM
[Normal] : affect was normal and insight and judgment were intact [de-identified] : Overweight [de-identified] : She does have tenderness above the sinuses and across the bridge of the nose.

## 2022-09-23 NOTE — HISTORY OF PRESENT ILLNESS
[FreeTextEntry8] : This is a 61-year-old female who presents with 2 months of facial pain the pain does involve the sinuses maxillary facial and the bridge of the nose\par \par She has been treated in urgent care with antibiotics and nasal decongestants with no relief\par \par In addition she sleeps sitting up because she feels that her pharynx is filled with mucus if she lies flat\par \par She remains on low doses of steroids for polymyalgia

## 2022-09-30 ENCOUNTER — TRANSCRIPTION ENCOUNTER (OUTPATIENT)
Age: 62
End: 2022-09-30

## 2022-09-30 LAB
ALBUMIN SERPL ELPH-MCNC: 5 G/DL
ALP BLD-CCNC: 96 U/L
ALT SERPL-CCNC: 54 U/L
ANION GAP SERPL CALC-SCNC: 15 MMOL/L
AST SERPL-CCNC: 26 U/L
BASOPHILS # BLD AUTO: 0.03 K/UL
BASOPHILS NFR BLD AUTO: 0.3 %
BILIRUB SERPL-MCNC: 0.4 MG/DL
BUN SERPL-MCNC: 21 MG/DL
CALCIUM SERPL-MCNC: 11.1 MG/DL
CALCIUM SERPL-MCNC: 11.6 MG/DL
CHLORIDE SERPL-SCNC: 97 MMOL/L
CHOLEST SERPL-MCNC: 228 MG/DL
CO2 SERPL-SCNC: 25 MMOL/L
CREAT SERPL-MCNC: 1.01 MG/DL
EGFR: 63 ML/MIN/1.73M2
EOSINOPHIL # BLD AUTO: 0.1 K/UL
EOSINOPHIL NFR BLD AUTO: 1.1 %
ESTIMATED AVERAGE GLUCOSE: 120 MG/DL
GLUCOSE SERPL-MCNC: 104 MG/DL
HBA1C MFR BLD HPLC: 5.8 %
HCT VFR BLD CALC: 47.2 %
HDLC SERPL-MCNC: 59 MG/DL
HGB BLD-MCNC: 15.9 G/DL
IMM GRANULOCYTES NFR BLD AUTO: 0.3 %
LDLC SERPL CALC-MCNC: 139 MG/DL
LYMPHOCYTES # BLD AUTO: 1.71 K/UL
LYMPHOCYTES NFR BLD AUTO: 19.3 %
MAN DIFF?: NORMAL
MCHC RBC-ENTMCNC: 27.9 PG
MCHC RBC-ENTMCNC: 33.7 GM/DL
MCV RBC AUTO: 82.8 FL
MONOCYTES # BLD AUTO: 0.49 K/UL
MONOCYTES NFR BLD AUTO: 5.5 %
NEUTROPHILS # BLD AUTO: 6.49 K/UL
NEUTROPHILS NFR BLD AUTO: 73.5 %
NONHDLC SERPL-MCNC: 169 MG/DL
PARATHYROID HORMONE INTACT: 19 PG/ML
PLATELET # BLD AUTO: 341 K/UL
POTASSIUM SERPL-SCNC: 4.3 MMOL/L
PROT SERPL-MCNC: 7.7 G/DL
RBC # BLD: 5.7 M/UL
RBC # FLD: 14.6 %
SODIUM SERPL-SCNC: 138 MMOL/L
T3RU NFR SERPL: 1.1 TBI
T4 SERPL-MCNC: 11.2 UG/DL
TRIGL SERPL-MCNC: 152 MG/DL
TSH SERPL-ACNC: 4.25 UIU/ML
URATE SERPL-MCNC: 5.7 MG/DL
WBC # FLD AUTO: 8.85 K/UL

## 2022-10-13 ENCOUNTER — APPOINTMENT (OUTPATIENT)
Dept: OTOLARYNGOLOGY | Facility: CLINIC | Age: 62
End: 2022-10-13

## 2022-10-13 VITALS
HEART RATE: 113 BPM | WEIGHT: 249 LBS | SYSTOLIC BLOOD PRESSURE: 145 MMHG | BODY MASS INDEX: 41.48 KG/M2 | HEIGHT: 65 IN | TEMPERATURE: 97.4 F | DIASTOLIC BLOOD PRESSURE: 90 MMHG

## 2022-10-13 DIAGNOSIS — H69.81 OTHER SPECIFIED DISORDERS OF EUSTACHIAN TUBE, RIGHT EAR: ICD-10-CM

## 2022-10-13 DIAGNOSIS — J34.2 DEVIATED NASAL SEPTUM: ICD-10-CM

## 2022-10-13 DIAGNOSIS — J32.0 CHRONIC MAXILLARY SINUSITIS: ICD-10-CM

## 2022-10-13 DIAGNOSIS — R09.81 NASAL CONGESTION: ICD-10-CM

## 2022-10-13 PROCEDURE — 31231 NASAL ENDOSCOPY DX: CPT

## 2022-10-13 PROCEDURE — 92557 COMPREHENSIVE HEARING TEST: CPT

## 2022-10-13 PROCEDURE — 99204 OFFICE O/P NEW MOD 45 MIN: CPT | Mod: 25

## 2022-10-13 NOTE — ASSESSMENT
[FreeTextEntry1] : Patient with a history of polymyalgia on steroids been having symptoms consistent with sinus symptoms treated with 2 courses of antibiotics no improvement recently returned from a flight from [place] right ear has been bothering her no evidence of TMJ on examination endoscopically severely deviated septum but no tumors masses or polyps ear examinations also within normal limits audiogram was performed showed essentially normal presbycusis hearing loss pattern symmetrical nature.  Because of her underlying fibromyalgia need to see the condition of her sinuses septum for a CAT scan and she is already on nasal sprays steroids and been treated with antibiotics no further medical interventions indicated at this time other than possibly addressing fibromyalgia in another way if the CAT scan shows that her sinuses are indeed clear.

## 2022-10-13 NOTE — HISTORY OF PRESENT ILLNESS
[de-identified] : Poornima stated to have nasal congestion and sinus pressure August 2nd. She went to urgent care on August 16th after these issues persisted and was given a Xpack for sinus and ear infection. Covid negative at the time. She continues to have the same sinus pressure, nasal congestion and ear pressure. She saw another urgent care doctor who gave her another antibiotic and cough medication. Patient was seen by her PCP and was advised that she had a severe sinus infection and needed to be seen by ENT. She is blowing out thick clear mucus and when she blows her right ear  pain is severe. Yesterday on the airplane she had severe pain and pressure as well in the right ear. When she lays down she has worsening of pressure in the teeth and in the throat \par She has been doing Flonase since this started and is using Vicks Sinus capsules. She on oral steroids for her polymyalgia rheumatica

## 2022-10-13 NOTE — END OF VISIT
[FreeTextEntry3] : I, Dr. Calderon personally performed the evaluation and management (E/M) services including all necessary procedures, for this new patient. That E/M includes conducting the clinically appropriate initial history &/or exam, assessing all conditions, and establishing the plan of care. Today, my KIMBERLY, Yenny Read, was here to observe &/or participate in the visit & follow plan of care established by me.\par \par \par

## 2022-10-13 NOTE — REVIEW OF SYSTEMS
[Post Nasal Drip] : post nasal drip [Ear Pain] : ear pain [Nasal Congestion] : nasal congestion [Sinus Pain] : sinus pain [Sinus Pressure] : sinus pressure [Negative] : Heme/Lymph

## 2022-10-14 ENCOUNTER — NON-APPOINTMENT (OUTPATIENT)
Age: 62
End: 2022-10-14

## 2022-10-14 ENCOUNTER — APPOINTMENT (OUTPATIENT)
Dept: CT IMAGING | Facility: CLINIC | Age: 62
End: 2022-10-14

## 2022-10-14 PROCEDURE — 70486 CT MAXILLOFACIAL W/O DYE: CPT

## 2022-10-17 ENCOUNTER — APPOINTMENT (OUTPATIENT)
Dept: RHEUMATOLOGY | Facility: CLINIC | Age: 62
End: 2022-10-17

## 2022-10-17 VITALS
TEMPERATURE: 97.6 F | HEART RATE: 100 BPM | WEIGHT: 249 LBS | DIASTOLIC BLOOD PRESSURE: 82 MMHG | BODY MASS INDEX: 41.48 KG/M2 | OXYGEN SATURATION: 97 % | RESPIRATION RATE: 16 BRPM | SYSTOLIC BLOOD PRESSURE: 134 MMHG | HEIGHT: 65 IN

## 2022-10-17 DIAGNOSIS — M54.2 CERVICALGIA: ICD-10-CM

## 2022-10-17 LAB
BASOPHILS # BLD AUTO: 0.02 K/UL
BASOPHILS NFR BLD AUTO: 0.3 %
CALCIUM SERPL-MCNC: 10 MG/DL
CALCIUM SERPL-MCNC: 10 MG/DL
EOSINOPHIL # BLD AUTO: 0.09 K/UL
EOSINOPHIL NFR BLD AUTO: 1.3 %
HCT VFR BLD CALC: 47.4 %
HGB BLD-MCNC: 15.5 G/DL
IMM GRANULOCYTES NFR BLD AUTO: 0.6 %
LYMPHOCYTES # BLD AUTO: 1.04 K/UL
LYMPHOCYTES NFR BLD AUTO: 14.9 %
MAN DIFF?: NORMAL
MCHC RBC-ENTMCNC: 27.8 PG
MCHC RBC-ENTMCNC: 32.7 GM/DL
MCV RBC AUTO: 84.9 FL
MONOCYTES # BLD AUTO: 0.48 K/UL
MONOCYTES NFR BLD AUTO: 6.9 %
NEUTROPHILS # BLD AUTO: 5.3 K/UL
NEUTROPHILS NFR BLD AUTO: 76 %
PARATHYROID HORMONE INTACT: 32 PG/ML
PLATELET # BLD AUTO: 276 K/UL
RBC # BLD: 5.58 M/UL
RBC # FLD: 14.2 %
WBC # FLD AUTO: 6.97 K/UL

## 2022-10-17 PROCEDURE — 99215 OFFICE O/P EST HI 40 MIN: CPT

## 2022-10-17 NOTE — OCCUPATIONAL THERAPY INITIAL EVALUATION ADULT - SHORT TERM MEMORY, REHAB EVAL
intact Doxepin Pregnancy And Lactation Text: This medication is Pregnancy Category C and it isn't known if it is safe during pregnancy. It is also excreted in breast milk and breast feeding isn't recommended.

## 2022-10-19 ENCOUNTER — RESULT REVIEW (OUTPATIENT)
Age: 62
End: 2022-10-19

## 2022-10-20 LAB
ALBUMIN SERPL ELPH-MCNC: 4.9 G/DL
ALP BLD-CCNC: 87 U/L
ALT SERPL-CCNC: 34 U/L
ANION GAP SERPL CALC-SCNC: 15 MMOL/L
AST SERPL-CCNC: 21 U/L
BILIRUB SERPL-MCNC: 0.3 MG/DL
BUN SERPL-MCNC: 19 MG/DL
CALCIUM SERPL-MCNC: 10.3 MG/DL
CHLORIDE SERPL-SCNC: 98 MMOL/L
CO2 SERPL-SCNC: 25 MMOL/L
CREAT SERPL-MCNC: 0.89 MG/DL
CRP SERPL-MCNC: <3 MG/L
EGFR: 72 ML/MIN/1.73M2
ERYTHROCYTE [SEDIMENTATION RATE] IN BLOOD BY WESTERGREN METHOD: 18 MM/HR
POTASSIUM SERPL-SCNC: 4.3 MMOL/L
PROT SERPL-MCNC: 7.7 G/DL
SODIUM SERPL-SCNC: 138 MMOL/L

## 2022-10-21 ENCOUNTER — NON-APPOINTMENT (OUTPATIENT)
Age: 62
End: 2022-10-21

## 2022-10-22 ENCOUNTER — APPOINTMENT (OUTPATIENT)
Dept: CT IMAGING | Facility: CLINIC | Age: 62
End: 2022-10-22

## 2022-10-22 PROCEDURE — 70491 CT SOFT TISSUE NECK W/DYE: CPT

## 2022-11-21 ENCOUNTER — RX RENEWAL (OUTPATIENT)
Age: 62
End: 2022-11-21

## 2022-11-22 ENCOUNTER — APPOINTMENT (OUTPATIENT)
Dept: INTERNAL MEDICINE | Facility: CLINIC | Age: 62
End: 2022-11-22

## 2022-11-22 ENCOUNTER — LABORATORY RESULT (OUTPATIENT)
Age: 62
End: 2022-11-22

## 2022-11-22 ENCOUNTER — NON-APPOINTMENT (OUTPATIENT)
Age: 62
End: 2022-11-22

## 2022-11-22 VITALS
BODY MASS INDEX: 41.32 KG/M2 | HEIGHT: 65 IN | DIASTOLIC BLOOD PRESSURE: 82 MMHG | WEIGHT: 248 LBS | SYSTOLIC BLOOD PRESSURE: 142 MMHG

## 2022-11-22 DIAGNOSIS — Z00.00 ENCOUNTER FOR GENERAL ADULT MEDICAL EXAMINATION W/OUT ABNORMAL FINDINGS: ICD-10-CM

## 2022-11-22 DIAGNOSIS — G50.1 ATYPICAL FACIAL PAIN: ICD-10-CM

## 2022-11-22 PROCEDURE — 36415 COLL VENOUS BLD VENIPUNCTURE: CPT

## 2022-11-22 PROCEDURE — 93000 ELECTROCARDIOGRAM COMPLETE: CPT | Mod: 59

## 2022-11-22 PROCEDURE — 99396 PREV VISIT EST AGE 40-64: CPT | Mod: 25

## 2022-11-22 NOTE — ASSESSMENT
[FreeTextEntry1] : This is a 62-year-old female whose history has been reviewed above\par \par She has been suffering from polymyalgia rheumatica she remains on steroids with tapering doses.  It is remarkable that 2 siblings also have the same disease.  She will continue to follow-up with rheumatology.  Her muscle strength is good and her pain is diminished\par \par She has a history of debilitating facial and ear pain which remarkably has resolved with the use of Astelin nose drops within 3 days\par \par The work-up was done by neurology ENT x2 she did have an MRI which showed an empty sella evaluated by neurology in addition she had a CT of the neck which was negative no further intervention\par \par She has a history of cryptitis which has recurred despite a cholecystectomy currently she is asymptomatic no intervention\par \par She does have a history of reflux she stays on a PPI I will try to switch her to famotidine\par \par She has a history of hypothyroidism she remains on Synthroid a TSH has been obtained medication changes recommended on the results\par \par She is up-to-date with vaccinations with the exception of shingles colonoscopy OB/GYN mammography we will order a bone density\par \par She has a history of hypertension her blood pressure is above goal I will start her on hydrochlorothiazide and potassium we will repeat her blood SMA-6 in 10 days\par \par We also discussed weight loss and exercise as tolerated.  Discussed structured diets and the morbidity associated with weight

## 2022-11-22 NOTE — HEALTH RISK ASSESSMENT
[Never] : Never [No] : No [No falls in past year] : Patient reported no falls in the past year [0] : 2) Feeling down, depressed, or hopeless: Not at all (0) [PHQ-2 Negative - No further assessment needed] : PHQ-2 Negative - No further assessment needed [None] : None [Alone] : lives alone [College] : College [Single] : single [Fully functional (bathing, dressing, toileting, transferring, walking, feeding)] : Fully functional (bathing, dressing, toileting, transferring, walking, feeding) [Fully functional (using the telephone, shopping, preparing meals, housekeeping, doing laundry, using] : Fully functional and needs no help or supervision to perform IADLs (using the telephone, shopping, preparing meals, housekeeping, doing laundry, using transportation, managing medications and managing finances) [Smoke Detector] : smoke detector [Carbon Monoxide Detector] : carbon monoxide detector [Seat Belt] :  uses seat belt [Sunscreen] : uses sunscreen [I will adhere to the patient's wishes.] : I will adhere to the patient's wishes. [Change in mental status noted] : No change in mental status noted [Sexually Active] : not sexually active [Reports changes in hearing] : Reports no changes in hearing [Reports changes in vision] : Reports no changes in vision [Reports changes in dental health] : Reports no changes in dental health [Guns at Home] : no guns at home [Safety elements used in home] : no safety elements used in home [Travel to Developing Areas] : does not  travel to developing areas [TB Exposure] : is not being exposed to tuberculosis [Caregiver Concerns] : does not have caregiver concerns

## 2022-11-22 NOTE — HISTORY OF PRESENT ILLNESS
[FreeTextEntry1] : This is a 62-year-old female for annual health assessment\par \par Specifically we will address her history of polymyalgia rheumatica reflux hypertension hypercholesterolemia past medical history of pancreatitis weight hypothyroidism and ear and jaw pain [de-identified] : Patient is feeling better in terms of her polymyalgia rheumatica but still has proximal muscle pain.  Her debilitating jaw and ear pain seems to have improved with the instillation of Astelin\par \par Review of systems otherwise is negative

## 2022-11-23 LAB
25(OH)D3 SERPL-MCNC: 23.8 NG/ML
ALBUMIN SERPL ELPH-MCNC: 5 G/DL
ALP BLD-CCNC: 104 U/L
ALT SERPL-CCNC: 36 U/L
ANION GAP SERPL CALC-SCNC: 15 MMOL/L
APPEARANCE: CLEAR
AST SERPL-CCNC: 20 U/L
BASOPHILS # BLD AUTO: 0.02 K/UL
BASOPHILS NFR BLD AUTO: 0.2 %
BILIRUB SERPL-MCNC: 0.3 MG/DL
BILIRUBIN URINE: NEGATIVE
BLOOD URINE: NEGATIVE
BUN SERPL-MCNC: 22 MG/DL
CALCIUM SERPL-MCNC: 10.4 MG/DL
CHLORIDE SERPL-SCNC: 102 MMOL/L
CHOLEST SERPL-MCNC: 207 MG/DL
CO2 SERPL-SCNC: 25 MMOL/L
COLOR: NORMAL
CREAT SERPL-MCNC: 0.83 MG/DL
EGFR: 79 ML/MIN/1.73M2
EOSINOPHIL # BLD AUTO: 0.09 K/UL
EOSINOPHIL NFR BLD AUTO: 1.1 %
ESTIMATED AVERAGE GLUCOSE: 123 MG/DL
GLUCOSE QUALITATIVE U: NEGATIVE
GLUCOSE SERPL-MCNC: 101 MG/DL
HBA1C MFR BLD HPLC: 5.9 %
HCT VFR BLD CALC: 47.5 %
HDLC SERPL-MCNC: 58 MG/DL
HGB BLD-MCNC: 15.7 G/DL
IMM GRANULOCYTES NFR BLD AUTO: 0.6 %
KETONES URINE: NEGATIVE
LDLC SERPL CALC-MCNC: 121 MG/DL
LEUKOCYTE ESTERASE URINE: ABNORMAL
LYMPHOCYTES # BLD AUTO: 1.55 K/UL
LYMPHOCYTES NFR BLD AUTO: 18.4 %
MAN DIFF?: NORMAL
MCHC RBC-ENTMCNC: 28.1 PG
MCHC RBC-ENTMCNC: 33.1 GM/DL
MCV RBC AUTO: 85 FL
MONOCYTES # BLD AUTO: 0.58 K/UL
MONOCYTES NFR BLD AUTO: 6.9 %
NEUTROPHILS # BLD AUTO: 6.13 K/UL
NEUTROPHILS NFR BLD AUTO: 72.8 %
NITRITE URINE: NEGATIVE
NONHDLC SERPL-MCNC: 148 MG/DL
PH URINE: 6
PLATELET # BLD AUTO: 345 K/UL
POTASSIUM SERPL-SCNC: 4.4 MMOL/L
PROT SERPL-MCNC: 7.6 G/DL
PROTEIN URINE: NORMAL
RBC # BLD: 5.59 M/UL
RBC # FLD: 14.2 %
SODIUM SERPL-SCNC: 141 MMOL/L
SPECIFIC GRAVITY URINE: 1.02
T3RU NFR SERPL: 0.9 TBI
T4 SERPL-MCNC: 11.5 UG/DL
TRIGL SERPL-MCNC: 137 MG/DL
TSH SERPL-ACNC: 1.18 UIU/ML
URATE SERPL-MCNC: 5.4 MG/DL
UROBILINOGEN URINE: NORMAL
WBC # FLD AUTO: 8.42 K/UL

## 2022-12-01 ENCOUNTER — APPOINTMENT (OUTPATIENT)
Dept: ORTHOPEDIC SURGERY | Facility: CLINIC | Age: 62
End: 2022-12-01

## 2022-12-01 VITALS — WEIGHT: 248 LBS | HEIGHT: 65 IN | BODY MASS INDEX: 41.32 KG/M2

## 2022-12-01 DIAGNOSIS — M41.9 SCOLIOSIS, UNSPECIFIED: ICD-10-CM

## 2022-12-01 DIAGNOSIS — M47.816 SPONDYLOSIS W/OUT MYELOPATHY OR RADICULOPATHY, LUMBAR REGION: ICD-10-CM

## 2022-12-01 PROCEDURE — 99214 OFFICE O/P EST MOD 30 MIN: CPT

## 2022-12-01 PROCEDURE — 72170 X-RAY EXAM OF PELVIS: CPT

## 2022-12-01 PROCEDURE — 72100 X-RAY EXAM L-S SPINE 2/3 VWS: CPT

## 2022-12-01 NOTE — IMAGING
[Facet arthropathy] : Facet arthropathy [Disc space narrowing] : Disc space narrowing [Scoliosis] : Scoliosis [Left] : left hip with pelvis [AP] : anteroposterior [There are no fractures, subluxations or dislocations. No significant abnormalities are seen] : There are no fractures, subluxations or dislocations. No significant abnormalities are seen

## 2022-12-01 NOTE — ASSESSMENT
[FreeTextEntry1] : 62 year F WITH MODERATE LT HIP PAIN. PAIN IS NOT IN THE GROIN AND SOMETIMES RADIATE DOWN THE LE. PAIN WORSENS WITH PROLONGED STANDING AND SITTING TO STAND. PAIN IS AFFECTING FUNCTIONAL ACTIVITIES; GETTING IN AND OUT OF CARS. HIP XRAYS REVIEWED AS NORMAL. LUMBAR XRAYS REVIEWED WITH OA, SCOLIOSIS. TREATMENT OPTIONS REVIEWED. LUMBAR PT RX. IF SYMPTOMS PERSIST WILL ORDER LUMBAR MRI AND FOLLOW UP WITH PAIN MANAGEMENT. MOBIC RX.

## 2022-12-01 NOTE — HISTORY OF PRESENT ILLNESS
[Lower back] : lower back [Gradual] : gradual [7] : 7 [6] : 6 [Dull/Aching] : dull/aching [Localized] : localized [Radiating] : radiating [Constant] : constant [Household chores] : household chores [Nothing helps with pain getting better] : Nothing helps with pain getting better [Walking] : walking [de-identified] : 12/01/22 here today with left hip pain  for about 3 months radiating to the spine area \par no injury  [] : no [FreeTextEntry1] : hip [FreeTextEntry5] : no injury  [de-identified] : nothing

## 2022-12-05 LAB
ALBUMIN SERPL ELPH-MCNC: 5 G/DL
ALP BLD-CCNC: 94 U/L
ALT SERPL-CCNC: 31 U/L
ANION GAP SERPL CALC-SCNC: 15 MMOL/L
AST SERPL-CCNC: 20 U/L
BILIRUB SERPL-MCNC: 0.3 MG/DL
BUN SERPL-MCNC: 20 MG/DL
CALCIUM SERPL-MCNC: 10.4 MG/DL
CHLORIDE SERPL-SCNC: 101 MMOL/L
CHOLEST SERPL-MCNC: 168 MG/DL
CO2 SERPL-SCNC: 27 MMOL/L
CREAT SERPL-MCNC: 0.93 MG/DL
EGFR: 69 ML/MIN/1.73M2
GLUCOSE SERPL-MCNC: 109 MG/DL
HDLC SERPL-MCNC: 50 MG/DL
LDLC SERPL CALC-MCNC: 88 MG/DL
NONHDLC SERPL-MCNC: 118 MG/DL
POTASSIUM SERPL-SCNC: 4 MMOL/L
PROT SERPL-MCNC: 7.4 G/DL
SODIUM SERPL-SCNC: 142 MMOL/L
TRIGL SERPL-MCNC: 148 MG/DL

## 2022-12-06 ENCOUNTER — TRANSCRIPTION ENCOUNTER (OUTPATIENT)
Age: 62
End: 2022-12-06

## 2022-12-07 ENCOUNTER — TRANSCRIPTION ENCOUNTER (OUTPATIENT)
Age: 62
End: 2022-12-07

## 2022-12-10 ENCOUNTER — APPOINTMENT (OUTPATIENT)
Dept: RADIOLOGY | Facility: CLINIC | Age: 62
End: 2022-12-10

## 2022-12-10 PROCEDURE — 77085 DXA BONE DENSITY AXL VRT FX: CPT

## 2023-01-10 ENCOUNTER — NON-APPOINTMENT (OUTPATIENT)
Age: 63
End: 2023-01-10

## 2023-01-10 ENCOUNTER — APPOINTMENT (OUTPATIENT)
Dept: OPHTHALMOLOGY | Facility: CLINIC | Age: 63
End: 2023-01-10

## 2023-01-10 ENCOUNTER — APPOINTMENT (OUTPATIENT)
Dept: OPHTHALMOLOGY | Facility: CLINIC | Age: 63
End: 2023-01-10
Payer: COMMERCIAL

## 2023-01-10 PROCEDURE — 92020 GONIOSCOPY: CPT

## 2023-01-10 PROCEDURE — 92012 INTRM OPH EXAM EST PATIENT: CPT

## 2023-01-16 ENCOUNTER — RX RENEWAL (OUTPATIENT)
Age: 63
End: 2023-01-16

## 2023-03-28 ENCOUNTER — APPOINTMENT (OUTPATIENT)
Dept: INTERNAL MEDICINE | Facility: CLINIC | Age: 63
End: 2023-03-28

## 2023-04-01 ENCOUNTER — RX RENEWAL (OUTPATIENT)
Age: 63
End: 2023-04-01

## 2023-04-28 ENCOUNTER — RX RENEWAL (OUTPATIENT)
Age: 63
End: 2023-04-28

## 2023-06-22 ENCOUNTER — RX RENEWAL (OUTPATIENT)
Age: 63
End: 2023-06-22

## 2023-07-07 ENCOUNTER — APPOINTMENT (OUTPATIENT)
Dept: RHEUMATOLOGY | Facility: CLINIC | Age: 63
End: 2023-07-07
Payer: COMMERCIAL

## 2023-07-07 VITALS
DIASTOLIC BLOOD PRESSURE: 98 MMHG | WEIGHT: 256 LBS | HEART RATE: 104 BPM | BODY MASS INDEX: 42.65 KG/M2 | HEIGHT: 65 IN | SYSTOLIC BLOOD PRESSURE: 155 MMHG | OXYGEN SATURATION: 97 %

## 2023-07-07 PROCEDURE — 99214 OFFICE O/P EST MOD 30 MIN: CPT

## 2023-07-18 ENCOUNTER — NON-APPOINTMENT (OUTPATIENT)
Age: 63
End: 2023-07-18

## 2023-07-18 LAB
ALBUMIN SERPL ELPH-MCNC: 4.8 G/DL
ALP BLD-CCNC: 112 U/L
ALT SERPL-CCNC: 33 U/L
ANION GAP SERPL CALC-SCNC: 18 MMOL/L
AST SERPL-CCNC: 23 U/L
BILIRUB SERPL-MCNC: 0.5 MG/DL
BUN SERPL-MCNC: 30 MG/DL
CALCIUM SERPL-MCNC: 10.3 MG/DL
CHLORIDE SERPL-SCNC: 98 MMOL/L
CO2 SERPL-SCNC: 23 MMOL/L
CREAT SERPL-MCNC: 0.94 MG/DL
CRP SERPL-MCNC: 6 MG/L
EGFR: 68 ML/MIN/1.73M2
ERYTHROCYTE [SEDIMENTATION RATE] IN BLOOD BY WESTERGREN METHOD: 5 MM/HR
HAV IGM SER QL: NONREACTIVE
HBV CORE IGG+IGM SER QL: NONREACTIVE
HBV CORE IGM SER QL: NONREACTIVE
HBV SURFACE AG SER QL: NONREACTIVE
HCV AB SER QL: NONREACTIVE
HCV S/CO RATIO: 0.08 S/CO
M TB IFN-G BLD-IMP: NEGATIVE
POTASSIUM SERPL-SCNC: 4.2 MMOL/L
PROT SERPL-MCNC: 7.4 G/DL
QUANTIFERON TB PLUS MITOGEN MINUS NIL: 7.29 IU/ML
QUANTIFERON TB PLUS NIL: 0.01 IU/ML
QUANTIFERON TB PLUS TB1 MINUS NIL: 0 IU/ML
QUANTIFERON TB PLUS TB2 MINUS NIL: 0 IU/ML
SODIUM SERPL-SCNC: 139 MMOL/L

## 2023-07-18 NOTE — HISTORY OF PRESENT ILLNESS
[FreeTextEntry1] : works at a  home now and is happy at her new work place. 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

## 2023-07-18 NOTE — ASSESSMENT
[FreeTextEntry1] : Patient with PMR and chronic use of systemic steroids.  Unable to taper steroids due to recurrence of pain \par continue with present dose of prednisone 3 mg daily \par use of methotrexate as a steroid sparing agents discussed.  r/b/a discussed and written literature provided. \par check labs as outlined below prior to starting immunosuppression \par patient will let me know once she reviews all the information whether she would like to proceed\par OV 6 weeks, sooner PRN

## 2023-07-18 NOTE — PHYSICAL EXAM
[General Appearance - Alert] : alert [General Appearance - In No Acute Distress] : in no acute distress [Full Pulse] : the pedal pulses are present [Edema] : there was no peripheral edema [FreeTextEntry1] : no synovitis [Oriented To Time, Place, And Person] : oriented to person, place, and time [Impaired Insight] : insight and judgment were intact [Affect] : the affect was normal

## 2023-07-25 ENCOUNTER — APPOINTMENT (OUTPATIENT)
Dept: OPHTHALMOLOGY | Facility: CLINIC | Age: 63
End: 2023-07-25

## 2023-07-25 ENCOUNTER — APPOINTMENT (OUTPATIENT)
Dept: OPHTHALMOLOGY | Facility: CLINIC | Age: 63
End: 2023-07-25
Payer: COMMERCIAL

## 2023-07-25 ENCOUNTER — NON-APPOINTMENT (OUTPATIENT)
Age: 63
End: 2023-07-25

## 2023-07-25 PROCEDURE — 92012 INTRM OPH EXAM EST PATIENT: CPT

## 2023-07-25 PROCEDURE — 92020 GONIOSCOPY: CPT

## 2023-09-20 ENCOUNTER — RX RENEWAL (OUTPATIENT)
Age: 63
End: 2023-09-20

## 2023-10-10 ENCOUNTER — RX RENEWAL (OUTPATIENT)
Age: 63
End: 2023-10-10

## 2023-10-26 ENCOUNTER — RX RENEWAL (OUTPATIENT)
Age: 63
End: 2023-10-26

## 2023-10-26 RX ORDER — ATORVASTATIN CALCIUM 20 MG/1
20 TABLET, FILM COATED ORAL
Qty: 90 | Refills: 0 | Status: ACTIVE | COMMUNITY
Start: 2022-11-23 | End: 1900-01-01

## 2023-10-27 ENCOUNTER — APPOINTMENT (OUTPATIENT)
Dept: ORTHOPEDIC SURGERY | Facility: CLINIC | Age: 63
End: 2023-10-27
Payer: MEDICARE

## 2023-10-27 VITALS — HEIGHT: 65 IN | WEIGHT: 256 LBS | BODY MASS INDEX: 42.65 KG/M2

## 2023-10-27 DIAGNOSIS — Z86.79 PERSONAL HISTORY OF OTHER DISEASES OF THE CIRCULATORY SYSTEM: ICD-10-CM

## 2023-10-27 DIAGNOSIS — Z96.651 PRESENCE OF RIGHT ARTIFICIAL KNEE JOINT: ICD-10-CM

## 2023-10-27 PROCEDURE — 99215 OFFICE O/P EST HI 40 MIN: CPT

## 2023-10-27 PROCEDURE — 73564 X-RAY EXAM KNEE 4 OR MORE: CPT | Mod: LT

## 2023-10-30 ENCOUNTER — APPOINTMENT (OUTPATIENT)
Dept: CT IMAGING | Facility: CLINIC | Age: 63
End: 2023-10-30
Payer: MEDICARE

## 2023-10-30 PROCEDURE — 73700 CT LOWER EXTREMITY W/O DYE: CPT | Mod: LT,MH

## 2023-10-30 PROCEDURE — 76376 3D RENDER W/INTRP POSTPROCES: CPT | Mod: LT

## 2023-11-06 ENCOUNTER — RX RENEWAL (OUTPATIENT)
Age: 63
End: 2023-11-06

## 2023-11-11 ENCOUNTER — RX RENEWAL (OUTPATIENT)
Age: 63
End: 2023-11-11

## 2023-12-05 ENCOUNTER — RX RENEWAL (OUTPATIENT)
Age: 63
End: 2023-12-05

## 2023-12-10 ENCOUNTER — NON-APPOINTMENT (OUTPATIENT)
Age: 63
End: 2023-12-10

## 2023-12-22 ENCOUNTER — APPOINTMENT (OUTPATIENT)
Dept: RHEUMATOLOGY | Facility: CLINIC | Age: 63
End: 2023-12-22
Payer: MEDICARE

## 2023-12-22 VITALS
OXYGEN SATURATION: 97 % | TEMPERATURE: 98.3 F | WEIGHT: 256 LBS | BODY MASS INDEX: 42.65 KG/M2 | RESPIRATION RATE: 16 BRPM | DIASTOLIC BLOOD PRESSURE: 76 MMHG | HEART RATE: 101 BPM | SYSTOLIC BLOOD PRESSURE: 136 MMHG | HEIGHT: 65 IN

## 2023-12-22 DIAGNOSIS — M17.12 UNILATERAL PRIMARY OSTEOARTHRITIS, LEFT KNEE: ICD-10-CM

## 2023-12-22 PROCEDURE — 99214 OFFICE O/P EST MOD 30 MIN: CPT

## 2023-12-27 PROBLEM — M17.12 PRIMARY OSTEOARTHRITIS OF LEFT KNEE: Status: ACTIVE | Noted: 2022-05-10

## 2023-12-27 LAB
ALBUMIN SERPL ELPH-MCNC: 4.6 G/DL
ALP BLD-CCNC: 113 U/L
ALT SERPL-CCNC: 40 U/L
ANION GAP SERPL CALC-SCNC: 15 MMOL/L
AST SERPL-CCNC: 27 U/L
BASOPHILS # BLD AUTO: 0.02 K/UL
BASOPHILS NFR BLD AUTO: 0.3 %
BILIRUB SERPL-MCNC: 0.4 MG/DL
BUN SERPL-MCNC: 27 MG/DL
CALCIUM SERPL-MCNC: 10 MG/DL
CHLORIDE SERPL-SCNC: 100 MMOL/L
CO2 SERPL-SCNC: 24 MMOL/L
CREAT SERPL-MCNC: 0.96 MG/DL
CRP SERPL-MCNC: 5 MG/L
EGFR: 66 ML/MIN/1.73M2
EOSINOPHIL # BLD AUTO: 0.07 K/UL
EOSINOPHIL NFR BLD AUTO: 0.9 %
ERYTHROCYTE [SEDIMENTATION RATE] IN BLOOD BY WESTERGREN METHOD: 13 MM/HR
HCT VFR BLD CALC: 43.7 %
HGB BLD-MCNC: 14.6 G/DL
IMM GRANULOCYTES NFR BLD AUTO: 0.5 %
LYMPHOCYTES # BLD AUTO: 1.25 K/UL
LYMPHOCYTES NFR BLD AUTO: 16.2 %
MAN DIFF?: NORMAL
MCHC RBC-ENTMCNC: 27.8 PG
MCHC RBC-ENTMCNC: 33.4 GM/DL
MCV RBC AUTO: 83.2 FL
MONOCYTES # BLD AUTO: 0.6 K/UL
MONOCYTES NFR BLD AUTO: 7.8 %
NEUTROPHILS # BLD AUTO: 5.73 K/UL
NEUTROPHILS NFR BLD AUTO: 74.3 %
PLATELET # BLD AUTO: 318 K/UL
POTASSIUM SERPL-SCNC: 4 MMOL/L
PROT SERPL-MCNC: 7.3 G/DL
RBC # BLD: 5.25 M/UL
RBC # FLD: 14.3 %
SODIUM SERPL-SCNC: 139 MMOL/L
WBC # FLD AUTO: 7.71 K/UL

## 2023-12-27 NOTE — PHYSICAL EXAM
[General Appearance - Alert] : alert [General Appearance - In No Acute Distress] : in no acute distress [Full Pulse] : the pedal pulses are present [Edema] : there was no peripheral edema [FreeTextEntry1] : no synovitis.  [Oriented To Time, Place, And Person] : oriented to person, place, and time [Impaired Insight] : insight and judgment were intact [Affect] : the affect was normal

## 2023-12-27 NOTE — ASSESSMENT
[FreeTextEntry1] : continue with present dose of prednisone  will hold off on methotrexate dosing until after surgery  recommend pneumonia vaccine prior to starting methotrexate, will discuss with her PCP.  Knee OA--awaiting knee replacement surgery--take usual morning steroid dose. Give 50 mg hydrocortisone intravenously just before the procedure and 25 mg of hydrocortisone every eight hours for 24 hours. Resume usual prednisone dose thereafter. OV after surgery

## 2023-12-27 NOTE — HISTORY OF PRESENT ILLNESS
[FreeTextEntry1] : Patient present for f/u visit for PMR (on chronic steroids, taking prednisone 4 mg daily now, unable to taper). Last visit discussed MTX but was not ready to start She then discussed MTX with both her PCP and orthopedics.  She was told that she will be clear to start MTX six weeks after knee replacement surgery that is due in January.  Her PCP encouraged to her try methotrexate to be able to taper off steroids.  In the past, lower doses of steroids resulted in recurrence of pain and stiffness in the proximal thighs and arms. no headaches, no vision change, no jaw claudication

## 2023-12-29 NOTE — ED PROVIDER NOTE - NSCAREINITIATED _GEN_ER
----- Message from Deisy Chery sent at 12/29/2023 10:01 AM CST -----  Contact: Charla Nance was returning the phone call. Please call her back at [phone].    Thanks  TS     Raymundo Del Toro(Attending)

## 2024-01-02 ENCOUNTER — RX RENEWAL (OUTPATIENT)
Age: 64
End: 2024-01-02

## 2024-01-02 ENCOUNTER — NON-APPOINTMENT (OUTPATIENT)
Age: 64
End: 2024-01-02

## 2024-01-03 ENCOUNTER — RX RENEWAL (OUTPATIENT)
Age: 64
End: 2024-01-03

## 2024-01-05 ENCOUNTER — OUTPATIENT (OUTPATIENT)
Dept: OUTPATIENT SERVICES | Facility: HOSPITAL | Age: 64
LOS: 1 days | Discharge: HOME | End: 2024-01-05
Payer: MEDICARE

## 2024-01-05 VITALS
DIASTOLIC BLOOD PRESSURE: 83 MMHG | SYSTOLIC BLOOD PRESSURE: 159 MMHG | HEIGHT: 65 IN | RESPIRATION RATE: 17 BRPM | TEMPERATURE: 98 F | WEIGHT: 264.55 LBS | HEART RATE: 100 BPM | OXYGEN SATURATION: 100 %

## 2024-01-05 VITALS — HEIGHT: 65 IN | WEIGHT: 265.22 LBS

## 2024-01-05 DIAGNOSIS — Z01.818 ENCOUNTER FOR OTHER PREPROCEDURAL EXAMINATION: ICD-10-CM

## 2024-01-05 DIAGNOSIS — K21.9 GASTRO-ESOPHAGEAL REFLUX DISEASE WITHOUT ESOPHAGITIS: ICD-10-CM

## 2024-01-05 DIAGNOSIS — I10 ESSENTIAL (PRIMARY) HYPERTENSION: ICD-10-CM

## 2024-01-05 DIAGNOSIS — M35.3 POLYMYALGIA RHEUMATICA: ICD-10-CM

## 2024-01-05 DIAGNOSIS — M17.12 UNILATERAL PRIMARY OSTEOARTHRITIS, LEFT KNEE: ICD-10-CM

## 2024-01-05 DIAGNOSIS — E03.9 HYPOTHYROIDISM, UNSPECIFIED: ICD-10-CM

## 2024-01-05 DIAGNOSIS — E78.5 HYPERLIPIDEMIA, UNSPECIFIED: ICD-10-CM

## 2024-01-05 DIAGNOSIS — Z98.890 OTHER SPECIFIED POSTPROCEDURAL STATES: Chronic | ICD-10-CM

## 2024-01-05 DIAGNOSIS — S89.91XA UNSPECIFIED INJURY OF RIGHT LOWER LEG, INITIAL ENCOUNTER: Chronic | ICD-10-CM

## 2024-01-05 DIAGNOSIS — Z96.651 PRESENCE OF RIGHT ARTIFICIAL KNEE JOINT: Chronic | ICD-10-CM

## 2024-01-05 DIAGNOSIS — Z88.9 ALLERGY STATUS TO UNSPECIFIED DRUGS, MEDICAMENTS AND BIOLOGICAL SUBSTANCES: ICD-10-CM

## 2024-01-05 LAB
A1C WITH ESTIMATED AVERAGE GLUCOSE RESULT: 6.2 % — HIGH (ref 4–5.6)
A1C WITH ESTIMATED AVERAGE GLUCOSE RESULT: 6.2 % — HIGH (ref 4–5.6)
ALBUMIN SERPL ELPH-MCNC: 3.7 G/DL — SIGNIFICANT CHANGE UP (ref 3.3–5)
ALBUMIN SERPL ELPH-MCNC: 3.7 G/DL — SIGNIFICANT CHANGE UP (ref 3.3–5)
ALP SERPL-CCNC: 107 U/L — SIGNIFICANT CHANGE UP (ref 40–120)
ALP SERPL-CCNC: 107 U/L — SIGNIFICANT CHANGE UP (ref 40–120)
ALT FLD-CCNC: 41 U/L — SIGNIFICANT CHANGE UP (ref 12–78)
ALT FLD-CCNC: 41 U/L — SIGNIFICANT CHANGE UP (ref 12–78)
ANION GAP SERPL CALC-SCNC: 9 MMOL/L — SIGNIFICANT CHANGE UP (ref 5–17)
ANION GAP SERPL CALC-SCNC: 9 MMOL/L — SIGNIFICANT CHANGE UP (ref 5–17)
APTT BLD: 32.9 SEC — SIGNIFICANT CHANGE UP (ref 24.5–35.6)
APTT BLD: 32.9 SEC — SIGNIFICANT CHANGE UP (ref 24.5–35.6)
AST SERPL-CCNC: 19 U/L — SIGNIFICANT CHANGE UP (ref 15–37)
AST SERPL-CCNC: 19 U/L — SIGNIFICANT CHANGE UP (ref 15–37)
BASOPHILS # BLD AUTO: 0.03 K/UL — SIGNIFICANT CHANGE UP (ref 0–0.2)
BASOPHILS # BLD AUTO: 0.03 K/UL — SIGNIFICANT CHANGE UP (ref 0–0.2)
BASOPHILS NFR BLD AUTO: 0.4 % — SIGNIFICANT CHANGE UP (ref 0–2)
BASOPHILS NFR BLD AUTO: 0.4 % — SIGNIFICANT CHANGE UP (ref 0–2)
BILIRUB SERPL-MCNC: 0.5 MG/DL — SIGNIFICANT CHANGE UP (ref 0.2–1.2)
BILIRUB SERPL-MCNC: 0.5 MG/DL — SIGNIFICANT CHANGE UP (ref 0.2–1.2)
BLD GP AB SCN SERPL QL: SIGNIFICANT CHANGE UP
BLD GP AB SCN SERPL QL: SIGNIFICANT CHANGE UP
BUN SERPL-MCNC: 25 MG/DL — HIGH (ref 7–23)
BUN SERPL-MCNC: 25 MG/DL — HIGH (ref 7–23)
CALCIUM SERPL-MCNC: 9.5 MG/DL — SIGNIFICANT CHANGE UP (ref 8.5–10.1)
CALCIUM SERPL-MCNC: 9.5 MG/DL — SIGNIFICANT CHANGE UP (ref 8.5–10.1)
CHLORIDE SERPL-SCNC: 104 MMOL/L — SIGNIFICANT CHANGE UP (ref 96–108)
CHLORIDE SERPL-SCNC: 104 MMOL/L — SIGNIFICANT CHANGE UP (ref 96–108)
CO2 SERPL-SCNC: 28 MMOL/L — SIGNIFICANT CHANGE UP (ref 22–31)
CO2 SERPL-SCNC: 28 MMOL/L — SIGNIFICANT CHANGE UP (ref 22–31)
CREAT SERPL-MCNC: 1.12 MG/DL — SIGNIFICANT CHANGE UP (ref 0.5–1.3)
CREAT SERPL-MCNC: 1.12 MG/DL — SIGNIFICANT CHANGE UP (ref 0.5–1.3)
EGFR: 55 ML/MIN/1.73M2 — LOW
EGFR: 55 ML/MIN/1.73M2 — LOW
EOSINOPHIL # BLD AUTO: 0.12 K/UL — SIGNIFICANT CHANGE UP (ref 0–0.5)
EOSINOPHIL # BLD AUTO: 0.12 K/UL — SIGNIFICANT CHANGE UP (ref 0–0.5)
EOSINOPHIL NFR BLD AUTO: 1.6 % — SIGNIFICANT CHANGE UP (ref 0–6)
EOSINOPHIL NFR BLD AUTO: 1.6 % — SIGNIFICANT CHANGE UP (ref 0–6)
ESTIMATED AVERAGE GLUCOSE: 131 MG/DL — HIGH (ref 68–114)
ESTIMATED AVERAGE GLUCOSE: 131 MG/DL — HIGH (ref 68–114)
GLUCOSE SERPL-MCNC: 164 MG/DL — HIGH (ref 70–99)
GLUCOSE SERPL-MCNC: 164 MG/DL — HIGH (ref 70–99)
HCT VFR BLD CALC: 42.3 % — SIGNIFICANT CHANGE UP (ref 34.5–45)
HCT VFR BLD CALC: 42.3 % — SIGNIFICANT CHANGE UP (ref 34.5–45)
HGB BLD-MCNC: 14.4 G/DL — SIGNIFICANT CHANGE UP (ref 11.5–15.5)
HGB BLD-MCNC: 14.4 G/DL — SIGNIFICANT CHANGE UP (ref 11.5–15.5)
IMM GRANULOCYTES NFR BLD AUTO: 0.3 % — SIGNIFICANT CHANGE UP (ref 0–0.9)
IMM GRANULOCYTES NFR BLD AUTO: 0.3 % — SIGNIFICANT CHANGE UP (ref 0–0.9)
INR BLD: 0.89 RATIO — SIGNIFICANT CHANGE UP (ref 0.85–1.18)
INR BLD: 0.89 RATIO — SIGNIFICANT CHANGE UP (ref 0.85–1.18)
LYMPHOCYTES # BLD AUTO: 0.97 K/UL — LOW (ref 1–3.3)
LYMPHOCYTES # BLD AUTO: 0.97 K/UL — LOW (ref 1–3.3)
LYMPHOCYTES # BLD AUTO: 13.3 % — SIGNIFICANT CHANGE UP (ref 13–44)
LYMPHOCYTES # BLD AUTO: 13.3 % — SIGNIFICANT CHANGE UP (ref 13–44)
MCHC RBC-ENTMCNC: 27.5 PG — SIGNIFICANT CHANGE UP (ref 27–34)
MCHC RBC-ENTMCNC: 27.5 PG — SIGNIFICANT CHANGE UP (ref 27–34)
MCHC RBC-ENTMCNC: 34 G/DL — SIGNIFICANT CHANGE UP (ref 32–36)
MCHC RBC-ENTMCNC: 34 G/DL — SIGNIFICANT CHANGE UP (ref 32–36)
MCV RBC AUTO: 80.7 FL — SIGNIFICANT CHANGE UP (ref 80–100)
MCV RBC AUTO: 80.7 FL — SIGNIFICANT CHANGE UP (ref 80–100)
MONOCYTES # BLD AUTO: 0.46 K/UL — SIGNIFICANT CHANGE UP (ref 0–0.9)
MONOCYTES # BLD AUTO: 0.46 K/UL — SIGNIFICANT CHANGE UP (ref 0–0.9)
MONOCYTES NFR BLD AUTO: 6.3 % — SIGNIFICANT CHANGE UP (ref 2–14)
MONOCYTES NFR BLD AUTO: 6.3 % — SIGNIFICANT CHANGE UP (ref 2–14)
NEUTROPHILS # BLD AUTO: 5.7 K/UL — SIGNIFICANT CHANGE UP (ref 1.8–7.4)
NEUTROPHILS # BLD AUTO: 5.7 K/UL — SIGNIFICANT CHANGE UP (ref 1.8–7.4)
NEUTROPHILS NFR BLD AUTO: 78.1 % — HIGH (ref 43–77)
NEUTROPHILS NFR BLD AUTO: 78.1 % — HIGH (ref 43–77)
NRBC # BLD: 0 /100 WBCS — SIGNIFICANT CHANGE UP (ref 0–0)
NRBC # BLD: 0 /100 WBCS — SIGNIFICANT CHANGE UP (ref 0–0)
PLATELET # BLD AUTO: 302 K/UL — SIGNIFICANT CHANGE UP (ref 150–400)
PLATELET # BLD AUTO: 302 K/UL — SIGNIFICANT CHANGE UP (ref 150–400)
POTASSIUM SERPL-MCNC: 3.2 MMOL/L — LOW (ref 3.5–5.3)
POTASSIUM SERPL-MCNC: 3.2 MMOL/L — LOW (ref 3.5–5.3)
POTASSIUM SERPL-SCNC: 3.2 MMOL/L — LOW (ref 3.5–5.3)
POTASSIUM SERPL-SCNC: 3.2 MMOL/L — LOW (ref 3.5–5.3)
PROT SERPL-MCNC: 7.9 GM/DL — SIGNIFICANT CHANGE UP (ref 6–8.3)
PROT SERPL-MCNC: 7.9 GM/DL — SIGNIFICANT CHANGE UP (ref 6–8.3)
PROTHROM AB SERPL-ACNC: 10.7 SEC — SIGNIFICANT CHANGE UP (ref 9.5–13)
PROTHROM AB SERPL-ACNC: 10.7 SEC — SIGNIFICANT CHANGE UP (ref 9.5–13)
RBC # BLD: 5.24 M/UL — HIGH (ref 3.8–5.2)
RBC # BLD: 5.24 M/UL — HIGH (ref 3.8–5.2)
RBC # FLD: 14.4 % — SIGNIFICANT CHANGE UP (ref 10.3–14.5)
RBC # FLD: 14.4 % — SIGNIFICANT CHANGE UP (ref 10.3–14.5)
SODIUM SERPL-SCNC: 141 MMOL/L — SIGNIFICANT CHANGE UP (ref 135–145)
SODIUM SERPL-SCNC: 141 MMOL/L — SIGNIFICANT CHANGE UP (ref 135–145)
WBC # BLD: 7.3 K/UL — SIGNIFICANT CHANGE UP (ref 3.8–10.5)
WBC # BLD: 7.3 K/UL — SIGNIFICANT CHANGE UP (ref 3.8–10.5)
WBC # FLD AUTO: 7.3 K/UL — SIGNIFICANT CHANGE UP (ref 3.8–10.5)
WBC # FLD AUTO: 7.3 K/UL — SIGNIFICANT CHANGE UP (ref 3.8–10.5)

## 2024-01-05 PROCEDURE — 93010 ELECTROCARDIOGRAM REPORT: CPT

## 2024-01-05 NOTE — H&P PST ADULT - NSICDXPASTMEDICALHX_GEN_ALL_CORE_FT
PAST MEDICAL HISTORY:  Acute pancreatitis     Anxiety     DVT, lower extremity 20 years ago    Female breast neoplasm     H/O: hypothyroidism     Hyperlipidemia     Hypertension     Obesity     Rheumatoid arthritis     Superficial Phlebitis      PAST MEDICAL HISTORY:  Acute pancreatitis     Anxiety     DVT, lower extremity 20 years ago    Female breast neoplasm     GERD (gastroesophageal reflux disease)     Hyperlipidemia     Hypertension     Hypothyroidism     Obesity     Osteoarthritis of left knee     Polymyalgia rheumatica     Rheumatoid arthritis     Superficial Phlebitis

## 2024-01-05 NOTE — OCCUPATIONAL THERAPY INITIAL EVALUATION ADULT - PERTINENT HX OF CURRENT PROBLEM, REHAB EVAL
L knee OA which impacts pts ability to perform functional tasks/transfers and mobility. Pt is scheduled for L TKR on 1/17/24.

## 2024-01-05 NOTE — H&P PST ADULT - MUSCULOSKELETAL
decreased ROM due to pain details… decreased ROM due to pain/normal gait/strength 5/5 bilateral upper extremities

## 2024-01-05 NOTE — H&P PST ADULT - NSANTHOSAYNRD_GEN_A_CORE
denies/No. KIMBERLEY screening performed.  STOP BANG Legend: 0-2 = LOW Risk; 3-4 = INTERMEDIATE Risk; 5-8 = HIGH Risk No. KIMBERLEY screening performed.  STOP BANG Legend: 0-2 = LOW Risk; 3-4 = INTERMEDIATE Risk; 5-8 = HIGH Risk

## 2024-01-05 NOTE — H&P PST ADULT - NSICDXPASTSURGICALHX_GEN_ALL_CORE_FT
PAST SURGICAL HISTORY:  Hx of cholecystectomy     S/P lumpectomy, left breast     S/P thyroid surgery      PAST SURGICAL HISTORY:  Hx of cholecystectomy     Right knee injury several surgeries to knee in the past    S/P lumpectomy, left breast     S/P thyroid surgery     S/P total knee replacement, right

## 2024-01-05 NOTE — H&P PST ADULT - HISTORY OF PRESENT ILLNESS
59 yo female c/o right knee pain secondary to osteoarthritis - schedule for right knee arthroplasty    denies recent travels in the past 30 days. No fever, SOB, cough, flu like symptoms or body rash- covid screen  covid19 vaccine completed 61 yo female c/o right knee pain secondary to osteoarthritis - schedule for right knee arthroplasty    denies recent travels in the past 30 days. No fever, SOB, cough, flu like symptoms or body rash- covid screen  covid19 vaccine completed 62yo female with Obesity and medical h/o  HTN, HDL, GERD, Hypothyroid, and Polymyalgia Rheumatica on Prednisone twice daily, reports OA left knee and presents today for PST for scheduled for Left knee arthroplasty on 1/17/2024  NOTE: Multiple drug allergies - see list  64yo female with Obesity and medical h/o  HTN, HDL, GERD, Hypothyroid, and Polymyalgia Rheumatica on Prednisone twice daily, reports OA left knee and presents today for PST for scheduled for Left knee arthroplasty on 1/17/2024  NOTE: Multiple drug allergies - see list

## 2024-01-05 NOTE — OCCUPATIONAL THERAPY INITIAL EVALUATION ADULT - ADDITIONAL COMMENTS
Pt lives alone (Sister can assist post op) in a private house with 3 steps to enter with bilateral handrails (Far apart). Once inside, the pt main bedroom and bathroom is on that floor when entering. The pt bathroom has a walk in shower stall, fixed shower head, comfort height toilet seat and grab bars. The pt has a seat riser without arms. The pt was educated on 3/1 commode for safe transfers and ADL management. Pt deferred 3/1 commode. The pt ambulates with no device and owns a rolling walker and a SAC. The pt daily pain is a 6/10 at rest and a 8-9/10 with movement. The pt manages the pain with rest and Aleve/Tylenol. The pt has no recent outpatient PT, no recent falls and has no buckling of the legs. The pt wears glasses all the time, R handed, drives and has no hearing impairments.

## 2024-01-06 LAB
MRSA PCR RESULT.: SIGNIFICANT CHANGE UP
MRSA PCR RESULT.: SIGNIFICANT CHANGE UP
S AUREUS DNA NOSE QL NAA+PROBE: SIGNIFICANT CHANGE UP
S AUREUS DNA NOSE QL NAA+PROBE: SIGNIFICANT CHANGE UP
VIT D25+D1,25 OH+D1,25 PNL SERPL-MCNC: 75.6 PG/ML — SIGNIFICANT CHANGE UP (ref 19.9–79.3)
VIT D25+D1,25 OH+D1,25 PNL SERPL-MCNC: 75.6 PG/ML — SIGNIFICANT CHANGE UP (ref 19.9–79.3)

## 2024-01-08 ENCOUNTER — NON-APPOINTMENT (OUTPATIENT)
Age: 64
End: 2024-01-08

## 2024-01-08 ENCOUNTER — APPOINTMENT (OUTPATIENT)
Dept: INTERNAL MEDICINE | Facility: CLINIC | Age: 64
End: 2024-01-08
Payer: MEDICARE

## 2024-01-08 VITALS
DIASTOLIC BLOOD PRESSURE: 70 MMHG | WEIGHT: 260 LBS | SYSTOLIC BLOOD PRESSURE: 120 MMHG | BODY MASS INDEX: 43.32 KG/M2 | HEIGHT: 65 IN

## 2024-01-08 VITALS — DIASTOLIC BLOOD PRESSURE: 72 MMHG | SYSTOLIC BLOOD PRESSURE: 130 MMHG

## 2024-01-08 DIAGNOSIS — I10 ESSENTIAL (PRIMARY) HYPERTENSION: ICD-10-CM

## 2024-01-08 DIAGNOSIS — R94.31 ABNORMAL ELECTROCARDIOGRAM [ECG] [EKG]: ICD-10-CM

## 2024-01-08 DIAGNOSIS — E03.9 HYPOTHYROIDISM, UNSPECIFIED: ICD-10-CM

## 2024-01-08 PROCEDURE — 93000 ELECTROCARDIOGRAM COMPLETE: CPT

## 2024-01-08 PROCEDURE — 99214 OFFICE O/P EST MOD 30 MIN: CPT

## 2024-01-08 PROCEDURE — G2211 COMPLEX E/M VISIT ADD ON: CPT

## 2024-01-08 RX ORDER — AZELASTINE HYDROCHLORIDE 137 UG/1
0.1 SPRAY, METERED NASAL
Refills: 0 | Status: DISCONTINUED | COMMUNITY
End: 2024-01-08

## 2024-01-08 RX ORDER — PREDNISONE 5 MG/1
5 TABLET ORAL
Qty: 30 | Refills: 1 | Status: DISCONTINUED | COMMUNITY
Start: 2021-04-27 | End: 2024-01-08

## 2024-01-08 RX ORDER — ALPRAZOLAM 0.25 MG/1
0.25 TABLET ORAL 3 TIMES DAILY
Qty: 90 | Refills: 0 | Status: DISCONTINUED | COMMUNITY
Start: 2019-12-02 | End: 2024-01-08

## 2024-01-08 RX ORDER — MELOXICAM 15 MG/1
15 TABLET ORAL
Qty: 30 | Refills: 2 | Status: DISCONTINUED | COMMUNITY
Start: 2022-12-01 | End: 2024-01-08

## 2024-01-08 RX ORDER — LORATADINE 5 MG/5 ML
10 SOLUTION, ORAL ORAL DAILY
Refills: 0 | Status: DISCONTINUED | COMMUNITY
End: 2024-01-08

## 2024-01-09 ENCOUNTER — APPOINTMENT (OUTPATIENT)
Dept: OPHTHALMOLOGY | Facility: CLINIC | Age: 64
End: 2024-01-09
Payer: MEDICARE

## 2024-01-09 ENCOUNTER — NON-APPOINTMENT (OUTPATIENT)
Age: 64
End: 2024-01-09

## 2024-01-09 PROBLEM — M35.3 POLYMYALGIA RHEUMATICA: Chronic | Status: ACTIVE | Noted: 2024-01-05

## 2024-01-09 PROBLEM — K21.9 GASTRO-ESOPHAGEAL REFLUX DISEASE WITHOUT ESOPHAGITIS: Chronic | Status: ACTIVE | Noted: 2024-01-05

## 2024-01-09 PROBLEM — M17.12 UNILATERAL PRIMARY OSTEOARTHRITIS, LEFT KNEE: Chronic | Status: ACTIVE | Noted: 2024-01-05

## 2024-01-09 PROBLEM — E03.9 HYPOTHYROIDISM, UNSPECIFIED: Chronic | Status: ACTIVE | Noted: 2024-01-05

## 2024-01-09 LAB
POTASSIUM SERPL-SCNC: 4.1 MMOL/L
TSH SERPL-ACNC: 0.84 UIU/ML

## 2024-01-09 PROCEDURE — 92012 INTRM OPH EXAM EST PATIENT: CPT

## 2024-01-12 ENCOUNTER — NON-APPOINTMENT (OUTPATIENT)
Age: 64
End: 2024-01-12

## 2024-01-12 ENCOUNTER — APPOINTMENT (OUTPATIENT)
Dept: CARDIOLOGY | Facility: CLINIC | Age: 64
End: 2024-01-12
Payer: MEDICARE

## 2024-01-12 VITALS
DIASTOLIC BLOOD PRESSURE: 88 MMHG | HEART RATE: 102 BPM | WEIGHT: 252 LBS | HEIGHT: 65 IN | SYSTOLIC BLOOD PRESSURE: 135 MMHG | BODY MASS INDEX: 41.99 KG/M2 | OXYGEN SATURATION: 99 %

## 2024-01-12 DIAGNOSIS — Z01.810 ENCOUNTER FOR PREPROCEDURAL CARDIOVASCULAR EXAMINATION: ICD-10-CM

## 2024-01-12 DIAGNOSIS — Z01.818 ENCOUNTER FOR OTHER PREPROCEDURAL EXAMINATION: ICD-10-CM

## 2024-01-12 DIAGNOSIS — E78.00 PURE HYPERCHOLESTEROLEMIA, UNSPECIFIED: ICD-10-CM

## 2024-01-12 DIAGNOSIS — Z79.52 LONG TERM (CURRENT) USE OF SYSTEMIC STEROIDS: ICD-10-CM

## 2024-01-12 PROCEDURE — 93000 ELECTROCARDIOGRAM COMPLETE: CPT | Mod: NC

## 2024-01-12 PROCEDURE — 99203 OFFICE O/P NEW LOW 30 MIN: CPT | Mod: 25

## 2024-01-12 NOTE — HISTORY OF PRESENT ILLNESS
[FreeTextEntry1] : In brief, Rach Simmons 63 years old.  No history of diabetes non-smoker.  She played softball for many years quite active and developed significant arthritis of her knees had a right knee replacement without difficulty and now is in need of a left knee replacement  She has a history of mild hypertension and mild mixed hyperlipidemia on a statin She has a history of polymyalgia rheumatica on chronic steroids which soon will be changed to methotrexate.  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  She denies palpitations edema orthopnea PND  EKG January 13, 2023 normal sinus rhythm possible right atrial enlargement left axis deviation/left anterior hemiblock decreased R wave progression secondary to the left anterior hemiblock.  EKG is unchanged from her prior EKGs

## 2024-01-12 NOTE — ASSESSMENT
[FreeTextEntry1] : Rach Simmons is an overall healthy 63-year-old with mild hypertension well-controlled mixed hyperlipidemia on a statin no cardiac symptoms with an EKG with an axis deviation left anterior hemiblock causing decreased R wave progression.  There is no evidence of any cardiac issues.  Other than the arthritis of her left knee, she is not limited in her activity and is asymptomatic.  Physical exam, blood pressure unremarkable  The patient has no active cardiac issues  I see no cardiac contraindication to the planned surgery

## 2024-01-12 NOTE — REASON FOR VISIT
[FreeTextEntry1] : Rach kenny 63 years old here for preop cardiovascular evaluation prior to undergoing left knee replacement.  She has no significant cardiac issues and has a stable EKG with a left axis deviation left anterior hemiblock with decreased R wave progression.

## 2024-01-12 NOTE — DISCUSSION/SUMMARY
[FreeTextEntry1] : Proceed with left knee replacement. No further cardiac workup is needed There is no cardiac contraindication to the planned left knee replacement [EKG obtained to assist in diagnosis and management of assessed problem(s)] : EKG obtained to assist in diagnosis and management of assessed problem(s)

## 2024-01-12 NOTE — PHYSICAL EXAM
[Normal Conjunctiva] : normal conjunctiva [No Carotid Bruit] : no carotid bruit [Normal S1, S2] : normal S1, S2 [No Murmur] : no murmur [Clear Lung Fields] : clear lung fields [Good Air Entry] : good air entry [Soft] : abdomen soft [Non Tender] : non-tender [No Edema] : no edema [Normal DP B/L] : normal DP B/L [Moves all extremities] : moves all extremities [No Focal Deficits] : no focal deficits [de-identified] : Overweight but looks well no acute distress [de-identified] : Right knee scar

## 2024-01-16 NOTE — HISTORY OF PRESENT ILLNESS
[No Pertinent Cardiac History] : no history of aortic stenosis, atrial fibrillation, coronary artery disease, recent myocardial infarction, or implantable device/pacemaker [No Pertinent Pulmonary History] : no history of asthma, COPD, sleep apnea, or smoking [No Adverse Anesthesia Reaction] : no adverse anesthesia reaction in self or family member [Chronic Anticoagulation] : no chronic anticoagulation [Chronic Kidney Disease] : no chronic kidney disease [Diabetes] : no diabetes [FreeTextEntry1] :  left knee replacement [FreeTextEntry2] :  1/17/2024 [FreeTextEntry3] :  Dr. Stoddard [FreeTextEntry4] :  this is a 63-year-old female for preoperative evaluation prior to left knee replacement.  Her past medical history is significant for polymyalgia rheumatica maintained on steroids.  Difficulty with weight history of pancreatitis hypertension  hypercholesterolemia and hypothyroidism.  Patient states she is feeling well other than anxiety over the procedure and residual muscle pain.  She also has a history of an empty sella on MRI followed by neurology

## 2024-01-16 NOTE — ASSESSMENT
[FreeTextEntry4] :  this is a 63-year-old female for preoperative evaluation prior to left knee replacement.  Her past medical history is significant for polymyalgia rheumatica maintained on steroids.  Difficulty with weight history of pancreatitis hypertension  hypercholesterolemia and hypothyroidism.  Patient states she is feeling well other than anxiety over the procedure and residual muscle pain.  She also has a history of an empty sella on MRI followed by neurology  patient is feeling well with the exception of her muscle pain and knee pain.  Her laboratory was reviewed and she was noted to be hypokalemic a repeat potassium was obtained.  In the interim we will increase her oral potassium.  In addition we will obtain a TSH.  She had a abnormality noted on her EKG.  I did repeat her EKG today which seems to be unchanged from her previous tracings.  She remains asymptomatic however I will have her seen by cardiology prior to her going to surgery.  In addition on history she was not taking her potassium we did restart it I asked her to take 2 a day for the next 3 days then go back to 1 a day.  If necessary this will be repeated She of course will need perioperative steroids as outlined in rheumatology note

## 2024-01-16 NOTE — ADDENDUM
[FreeTextEntry1] : I spent 35 minutes face-to-face and reviewing records  TSH and potassium repeated and are normal.  Pending cardiology clearance no medical contraindications to this procedure  Cardiology clearance done and reviewed no medical contraindications to procedure

## 2024-01-16 NOTE — PHYSICAL EXAM
[No JVD] : no jugular venous distention [No Focal Deficits] : no focal deficits [Normal] : affect was normal and insight and judgment were intact [de-identified] :  overweight

## 2024-01-17 ENCOUNTER — APPOINTMENT (OUTPATIENT)
Dept: ORTHOPEDIC SURGERY | Facility: HOSPITAL | Age: 64
End: 2024-01-17

## 2024-01-23 ENCOUNTER — APPOINTMENT (OUTPATIENT)
Dept: OPHTHALMOLOGY | Facility: CLINIC | Age: 64
End: 2024-01-23

## 2024-01-23 ENCOUNTER — TRANSCRIPTION ENCOUNTER (OUTPATIENT)
Age: 64
End: 2024-01-23

## 2024-01-24 ENCOUNTER — INPATIENT (INPATIENT)
Facility: HOSPITAL | Age: 64
LOS: 0 days | Discharge: HOME HEALTH | End: 2024-01-25
Attending: ORTHOPAEDIC SURGERY | Admitting: ORTHOPAEDIC SURGERY
Payer: COMMERCIAL

## 2024-01-24 ENCOUNTER — APPOINTMENT (OUTPATIENT)
Dept: ORTHOPEDIC SURGERY | Facility: HOSPITAL | Age: 64
End: 2024-01-24
Payer: MEDICARE

## 2024-01-24 ENCOUNTER — TRANSCRIPTION ENCOUNTER (OUTPATIENT)
Age: 64
End: 2024-01-24

## 2024-01-24 VITALS
HEART RATE: 94 BPM | SYSTOLIC BLOOD PRESSURE: 125 MMHG | RESPIRATION RATE: 17 BRPM | OXYGEN SATURATION: 98 % | DIASTOLIC BLOOD PRESSURE: 79 MMHG | HEIGHT: 65 IN | TEMPERATURE: 98 F | WEIGHT: 248.9 LBS

## 2024-01-24 DIAGNOSIS — Z96.651 PRESENCE OF RIGHT ARTIFICIAL KNEE JOINT: Chronic | ICD-10-CM

## 2024-01-24 DIAGNOSIS — S89.91XA UNSPECIFIED INJURY OF RIGHT LOWER LEG, INITIAL ENCOUNTER: Chronic | ICD-10-CM

## 2024-01-24 DIAGNOSIS — Z98.890 OTHER SPECIFIED POSTPROCEDURAL STATES: Chronic | ICD-10-CM

## 2024-01-24 LAB
ANION GAP SERPL CALC-SCNC: 5 MMOL/L — SIGNIFICANT CHANGE UP (ref 5–17)
BUN SERPL-MCNC: 20 MG/DL — SIGNIFICANT CHANGE UP (ref 7–23)
CALCIUM SERPL-MCNC: 9.3 MG/DL — SIGNIFICANT CHANGE UP (ref 8.5–10.1)
CHLORIDE SERPL-SCNC: 107 MMOL/L — SIGNIFICANT CHANGE UP (ref 96–108)
CO2 SERPL-SCNC: 28 MMOL/L — SIGNIFICANT CHANGE UP (ref 22–31)
CREAT SERPL-MCNC: 1.04 MG/DL — SIGNIFICANT CHANGE UP (ref 0.5–1.3)
EGFR: 60 ML/MIN/1.73M2 — SIGNIFICANT CHANGE UP
GLUCOSE BLDC GLUCOMTR-MCNC: 116 MG/DL — HIGH (ref 70–99)
GLUCOSE SERPL-MCNC: 125 MG/DL — HIGH (ref 70–99)
HCT VFR BLD CALC: 41.2 % — SIGNIFICANT CHANGE UP (ref 34.5–45)
HGB BLD-MCNC: 13.8 G/DL — SIGNIFICANT CHANGE UP (ref 11.5–15.5)
MCHC RBC-ENTMCNC: 27.5 PG — SIGNIFICANT CHANGE UP (ref 27–34)
MCHC RBC-ENTMCNC: 33.5 G/DL — SIGNIFICANT CHANGE UP (ref 32–36)
MCV RBC AUTO: 82.2 FL — SIGNIFICANT CHANGE UP (ref 80–100)
NRBC # BLD: 0 /100 WBCS — SIGNIFICANT CHANGE UP (ref 0–0)
PLATELET # BLD AUTO: 208 K/UL — SIGNIFICANT CHANGE UP (ref 150–400)
POTASSIUM SERPL-MCNC: 3.9 MMOL/L — SIGNIFICANT CHANGE UP (ref 3.5–5.3)
POTASSIUM SERPL-SCNC: 3.9 MMOL/L — SIGNIFICANT CHANGE UP (ref 3.5–5.3)
RBC # BLD: 5.01 M/UL — SIGNIFICANT CHANGE UP (ref 3.8–5.2)
RBC # FLD: 13.6 % — SIGNIFICANT CHANGE UP (ref 10.3–14.5)
SODIUM SERPL-SCNC: 140 MMOL/L — SIGNIFICANT CHANGE UP (ref 135–145)
WBC # BLD: 6.37 K/UL — SIGNIFICANT CHANGE UP (ref 3.8–10.5)
WBC # FLD AUTO: 6.37 K/UL — SIGNIFICANT CHANGE UP (ref 3.8–10.5)

## 2024-01-24 PROCEDURE — 27447 TOTAL KNEE ARTHROPLASTY: CPT | Mod: LT

## 2024-01-24 PROCEDURE — 27447 TOTAL KNEE ARTHROPLASTY: CPT | Mod: AS,LT

## 2024-01-24 PROCEDURE — 20985 CPTR-ASST DIR MS PX: CPT

## 2024-01-24 PROCEDURE — 73560 X-RAY EXAM OF KNEE 1 OR 2: CPT | Mod: 26,LT

## 2024-01-24 RX ORDER — LANOLIN ALCOHOL/MO/W.PET/CERES
3 CREAM (GRAM) TOPICAL AT BEDTIME
Refills: 0 | Status: DISCONTINUED | OUTPATIENT
Start: 2024-01-24 | End: 2024-01-25

## 2024-01-24 RX ORDER — PANTOPRAZOLE SODIUM 20 MG/1
40 TABLET, DELAYED RELEASE ORAL
Refills: 0 | Status: DISCONTINUED | OUTPATIENT
Start: 2024-01-24 | End: 2024-01-25

## 2024-01-24 RX ORDER — CELECOXIB 200 MG/1
200 CAPSULE ORAL EVERY 12 HOURS
Refills: 0 | Status: DISCONTINUED | OUTPATIENT
Start: 2024-01-24 | End: 2024-01-25

## 2024-01-24 RX ORDER — ACETAMINOPHEN 500 MG
1000 TABLET ORAL ONCE
Refills: 0 | Status: COMPLETED | OUTPATIENT
Start: 2024-01-24 | End: 2024-01-25

## 2024-01-24 RX ORDER — DEXAMETHASONE 0.5 MG/5ML
10 ELIXIR ORAL ONCE
Refills: 0 | Status: COMPLETED | OUTPATIENT
Start: 2024-01-25 | End: 2024-01-25

## 2024-01-24 RX ORDER — ACETAMINOPHEN 500 MG
1000 TABLET ORAL ONCE
Refills: 0 | Status: DISCONTINUED | OUTPATIENT
Start: 2024-01-24 | End: 2024-01-25

## 2024-01-24 RX ORDER — OXYCODONE HYDROCHLORIDE 5 MG/1
5 TABLET ORAL EVERY 4 HOURS
Refills: 0 | Status: DISCONTINUED | OUTPATIENT
Start: 2024-01-24 | End: 2024-01-25

## 2024-01-24 RX ORDER — CELECOXIB 200 MG/1
200 CAPSULE ORAL ONCE
Refills: 0 | Status: COMPLETED | OUTPATIENT
Start: 2024-01-24 | End: 2024-01-24

## 2024-01-24 RX ORDER — SIMVASTATIN 20 MG/1
20 TABLET, FILM COATED ORAL AT BEDTIME
Refills: 0 | Status: DISCONTINUED | OUTPATIENT
Start: 2024-01-24 | End: 2024-01-25

## 2024-01-24 RX ORDER — LEVOTHYROXINE SODIUM 125 MCG
175 TABLET ORAL DAILY
Refills: 0 | Status: DISCONTINUED | OUTPATIENT
Start: 2024-01-24 | End: 2024-01-25

## 2024-01-24 RX ORDER — AMLODIPINE BESYLATE 2.5 MG/1
10 TABLET ORAL DAILY
Refills: 0 | Status: DISCONTINUED | OUTPATIENT
Start: 2024-01-24 | End: 2024-01-25

## 2024-01-24 RX ORDER — ACETAMINOPHEN 500 MG
1000 TABLET ORAL ONCE
Refills: 0 | Status: COMPLETED | OUTPATIENT
Start: 2024-01-24 | End: 2024-01-24

## 2024-01-24 RX ORDER — OXYCODONE HYDROCHLORIDE 5 MG/1
10 TABLET ORAL EVERY 4 HOURS
Refills: 0 | Status: DISCONTINUED | OUTPATIENT
Start: 2024-01-24 | End: 2024-01-25

## 2024-01-24 RX ORDER — APIXABAN 2.5 MG/1
2.5 TABLET, FILM COATED ORAL EVERY 12 HOURS
Refills: 0 | Status: DISCONTINUED | OUTPATIENT
Start: 2024-01-25 | End: 2024-01-25

## 2024-01-24 RX ORDER — SODIUM CHLORIDE 9 MG/ML
1000 INJECTION, SOLUTION INTRAVENOUS
Refills: 0 | Status: DISCONTINUED | OUTPATIENT
Start: 2024-01-24 | End: 2024-01-24

## 2024-01-24 RX ORDER — SENNA PLUS 8.6 MG/1
2 TABLET ORAL AT BEDTIME
Refills: 0 | Status: DISCONTINUED | OUTPATIENT
Start: 2024-01-24 | End: 2024-01-25

## 2024-01-24 RX ORDER — ACETAMINOPHEN 500 MG
650 TABLET ORAL ONCE
Refills: 0 | Status: COMPLETED | OUTPATIENT
Start: 2024-01-24 | End: 2024-01-24

## 2024-01-24 RX ORDER — ACETAMINOPHEN 500 MG
1000 TABLET ORAL EVERY 8 HOURS
Refills: 0 | Status: DISCONTINUED | OUTPATIENT
Start: 2024-01-24 | End: 2024-01-25

## 2024-01-24 RX ORDER — SODIUM CHLORIDE 9 MG/ML
1000 INJECTION, SOLUTION INTRAVENOUS
Refills: 0 | Status: DISCONTINUED | OUTPATIENT
Start: 2024-01-24 | End: 2024-01-25

## 2024-01-24 RX ORDER — TRAMADOL HYDROCHLORIDE 50 MG/1
50 TABLET ORAL EVERY 6 HOURS
Refills: 0 | Status: DISCONTINUED | OUTPATIENT
Start: 2024-01-24 | End: 2024-01-24

## 2024-01-24 RX ORDER — POLYETHYLENE GLYCOL 3350 17 G/17G
17 POWDER, FOR SOLUTION ORAL AT BEDTIME
Refills: 0 | Status: DISCONTINUED | OUTPATIENT
Start: 2024-01-24 | End: 2024-01-25

## 2024-01-24 RX ORDER — CEFAZOLIN SODIUM 1 G
2000 VIAL (EA) INJECTION EVERY 8 HOURS
Refills: 0 | Status: COMPLETED | OUTPATIENT
Start: 2024-01-24 | End: 2024-01-25

## 2024-01-24 RX ORDER — ONDANSETRON 8 MG/1
4 TABLET, FILM COATED ORAL ONCE
Refills: 0 | Status: DISCONTINUED | OUTPATIENT
Start: 2024-01-24 | End: 2024-01-24

## 2024-01-24 RX ORDER — FENTANYL CITRATE 50 UG/ML
25 INJECTION INTRAVENOUS
Refills: 0 | Status: DISCONTINUED | OUTPATIENT
Start: 2024-01-24 | End: 2024-01-24

## 2024-01-24 RX ORDER — ONDANSETRON 8 MG/1
4 TABLET, FILM COATED ORAL EVERY 6 HOURS
Refills: 0 | Status: DISCONTINUED | OUTPATIENT
Start: 2024-01-24 | End: 2024-01-25

## 2024-01-24 RX ORDER — MAGNESIUM HYDROXIDE 400 MG/1
30 TABLET, CHEWABLE ORAL DAILY
Refills: 0 | Status: DISCONTINUED | OUTPATIENT
Start: 2024-01-24 | End: 2024-01-25

## 2024-01-24 RX ORDER — ASCORBIC ACID 60 MG
500 TABLET,CHEWABLE ORAL
Refills: 0 | Status: DISCONTINUED | OUTPATIENT
Start: 2024-01-24 | End: 2024-01-25

## 2024-01-24 RX ORDER — FENTANYL CITRATE 50 UG/ML
50 INJECTION INTRAVENOUS
Refills: 0 | Status: DISCONTINUED | OUTPATIENT
Start: 2024-01-24 | End: 2024-01-24

## 2024-01-24 RX ADMIN — OXYCODONE HYDROCHLORIDE 10 MILLIGRAM(S): 5 TABLET ORAL at 23:25

## 2024-01-24 RX ADMIN — Medication 400 MILLIGRAM(S): at 16:11

## 2024-01-24 RX ADMIN — SENNA PLUS 2 TABLET(S): 8.6 TABLET ORAL at 22:28

## 2024-01-24 RX ADMIN — OXYCODONE HYDROCHLORIDE 10 MILLIGRAM(S): 5 TABLET ORAL at 18:32

## 2024-01-24 RX ADMIN — SODIUM CHLORIDE 125 MILLILITER(S): 9 INJECTION, SOLUTION INTRAVENOUS at 12:08

## 2024-01-24 RX ADMIN — Medication 1000 MILLIGRAM(S): at 17:11

## 2024-01-24 RX ADMIN — POLYETHYLENE GLYCOL 3350 17 GRAM(S): 17 POWDER, FOR SOLUTION ORAL at 22:28

## 2024-01-24 RX ADMIN — Medication 1 TABLET(S): at 12:08

## 2024-01-24 RX ADMIN — Medication 2 MILLIGRAM(S): at 23:04

## 2024-01-24 RX ADMIN — OXYCODONE HYDROCHLORIDE 5 MILLIGRAM(S): 5 TABLET ORAL at 14:21

## 2024-01-24 RX ADMIN — OXYCODONE HYDROCHLORIDE 10 MILLIGRAM(S): 5 TABLET ORAL at 22:28

## 2024-01-24 RX ADMIN — Medication 100 MILLIGRAM(S): at 16:11

## 2024-01-24 RX ADMIN — Medication 650 MILLIGRAM(S): at 07:16

## 2024-01-24 RX ADMIN — CELECOXIB 200 MILLIGRAM(S): 200 CAPSULE ORAL at 07:16

## 2024-01-24 RX ADMIN — OXYCODONE HYDROCHLORIDE 10 MILLIGRAM(S): 5 TABLET ORAL at 17:32

## 2024-01-24 RX ADMIN — Medication 3 MILLIGRAM(S): at 22:28

## 2024-01-24 RX ADMIN — SODIUM CHLORIDE 125 MILLILITER(S): 9 INJECTION, SOLUTION INTRAVENOUS at 23:05

## 2024-01-24 RX ADMIN — CELECOXIB 200 MILLIGRAM(S): 200 CAPSULE ORAL at 17:32

## 2024-01-24 RX ADMIN — OXYCODONE HYDROCHLORIDE 5 MILLIGRAM(S): 5 TABLET ORAL at 15:21

## 2024-01-24 RX ADMIN — CELECOXIB 200 MILLIGRAM(S): 200 CAPSULE ORAL at 18:32

## 2024-01-24 RX ADMIN — SIMVASTATIN 20 MILLIGRAM(S): 20 TABLET, FILM COATED ORAL at 22:28

## 2024-01-24 RX ADMIN — Medication 500 MILLIGRAM(S): at 17:32

## 2024-01-24 NOTE — OCCUPATIONAL THERAPY INITIAL EVALUATION ADULT - ADDITIONAL COMMENTS
Pt lives alone (Sister can assist post op) in a private house with 3 steps to enter with bilateral handrails (Far apart). Once inside, the pt main bedroom and bathroom is on that floor when entering. The pt bathroom has a walk in shower stall, fixed shower head, comfort height toilet seat and grab bars. The pt has a seat riser without arms. The pt was educated on 3/1 commode for safe transfers and ADL management. Pt deferred 3/1 commode. The pt ambulates with no device and owns a rolling walker and a SAC. The pt daily pain is a 6/10 at rest and a 8-9/10 with movement. The pt manages the pain with rest and Aleve/Tylenol. The pt has no recent outpatient PT, no recent falls and has no buckling of the legs. The pt wears glasses all the time, R handed, drives and has no hearing impairments

## 2024-01-24 NOTE — DISCHARGE NOTE PROVIDER - NSDCFUADDAPPT_GEN_ALL_CORE_FT
Follow up with your surgeon in two weeks. Call for appointment.  If you need more pain medication, call your surgeon's office. For medication refills or authorizations, please call [phone]  We recommend that you call and schedule a follow up appointment within 2-4 weeks with your primary care physician for repeat blood work (CBC and BMP) for post hospital discharge follow-up care.  Call your surgeon if you have increased redness/pain/drainage or fever. Return to ER for shortness of breath/calf tenderness.

## 2024-01-24 NOTE — DISCHARGE NOTE PROVIDER - CARE PROVIDER_API CALL
Selvin Stoddard Carl  Orthopaedic Surgery  [address] [place] [id number]  Phone: [phone]  Fax: [phone]  Follow Up Time:

## 2024-01-24 NOTE — DISCHARGE NOTE PROVIDER - NSDCFUADDINST_GEN_ALL_CORE_FT
Keep knee straight while at rest. Elevate the leg as much as possible ("toes above the nose") to help control swelling. Make sure you get up and take a brief walk every two hours to help with circulation and prevent stiffness. Incentive spirometer 10X/hour. Cryocuff to help with pain/inflammation.  Keep BETZY Dressing Clean, Dry and Intact. May shower with BETZY Dressing. Please do not scrub, soak, peel or pick at the BETZY dressing. No creams, lotions, or oils over dressing. May shower and let water run over dressing, no baths. Pat dry once out of shower. Dressing to be removed in 7 days. Discard dressing and battery in garbage. If dressing is saturated from border to border - may remove and replace with clean dry dressing.  Shower instructions for BETZY Dressing: Place battery pack in a water sealed bag (such as a Ziplock bag) and keep battery out of the water.   Alternately you can turn battery pack off (press orange button.) Twist tubing OFF battery pack before entering shower. Once done with showering. Pat dressing dry. Reconnect tubing and twist ON battery pack after you are dry. Then turn battery pack on (press orange button.)

## 2024-01-24 NOTE — PHYSICAL THERAPY INITIAL EVALUATION ADULT - PRECAUTIONS/LIMITATIONS, REHAB EVAL
From: Eduardo Lynn  To: Meghann Rosario  Sent: 2/16/2021 10:01 AM CST  Subject: Referral Request    Hello I was wondering about setting up an appointment for a vasectomy that my wife Nicolasa had talked to you about the other day.   fall precautions

## 2024-01-24 NOTE — DISCHARGE NOTE PROVIDER - NSDCFUSCHEDAPPT_GEN_ALL_CORE_FT
Selvin Stoddard  [facility]  Scheduled Appointment: 01/24/2024     Selvin Stoddard  [facility] Physician [facility]  [address] [facility]  Scheduled Appointment: 02/06/2024

## 2024-01-24 NOTE — OCCUPATIONAL THERAPY INITIAL EVALUATION ADULT - GENERAL OBSERVATIONS, REHAB EVAL
Chart reviewed and event noted to date. Pt encountered in bedside recliner, NAD, +BETZY dressing clean and intact.A&Ox4. Sister at bedside.

## 2024-01-24 NOTE — PATIENT PROFILE ADULT - FALL HARM RISK - HARM RISK INTERVENTIONS

## 2024-01-24 NOTE — PHYSICAL THERAPY INITIAL EVALUATION ADULT - STRENGTHENING, PT EVAL
Pt will improve muscle strength in all extremities to WFL in 1 to 2 weeks to perform Gait  independently

## 2024-01-24 NOTE — CONSULT NOTE ADULT - SUBJECTIVE AND OBJECTIVE BOX
DANY HENDERSON is a 63y Female s/p ROBOTIC ASSISTED LEFT TOTAL KNEE ARTHROPLASTY WITH LLUVIA      w/ h/o H/O: hypothyroidism    H/O: hypothyroidism    Superficial Phlebitis    Superficial Phlebitis    Female breast neoplasm    Female breast neoplasm    Female breast neoplasm    Hypertension    Hyperlipidemia    Anxiety    Obesity    Acute pancreatitis    Rheumatoid arthritis    DVT, lower extremity    Hypothyroidism    Osteoarthritis of left knee    Polymyalgia rheumatica    GERD (gastroesophageal reflux disease)      denies any chest pain shortness of breath palpitation dizziness lightheadedness nausea vomiting fever or chills    Hx of cholecystectomy    S/P thyroid surgery    S/P lumpectomy, left breast    Right knee injury    S/P total knee replacement, right      Family history of coronary artery disease (Sibling)      SH: doesnot smoke or drink at this time    morphine (Other)  pcn,morphine,dilaudid (Anaphylaxis; Rash)    acetaminophen     Tablet .. 1000 milliGRAM(s) Oral every 8 hours PRN  acetaminophen   IVPB .. 1000 milliGRAM(s) IV Intermittent once PRN  acetaminophen   IVPB .. 1000 milliGRAM(s) IV Intermittent once PRN  amLODIPine   Tablet 10 milliGRAM(s) Oral daily  ascorbic acid 500 milliGRAM(s) Oral two times a day  ceFAZolin   IVPB 2000 milliGRAM(s) IV Intermittent every 8 hours  celecoxib 200 milliGRAM(s) Oral every 12 hours  hydrochlorothiazide 12.5 milliGRAM(s) Oral daily  lactated ringers. 1000 milliLiter(s) IV Continuous <Continuous>  levothyroxine 175 MICROGram(s) Oral daily  magnesium hydroxide Suspension 30 milliLiter(s) Oral daily PRN  melatonin 3 milliGRAM(s) Oral at bedtime PRN  multivitamin 1 Tablet(s) Oral daily  ondansetron Injectable 4 milliGRAM(s) IV Push every 6 hours PRN  oxyCODONE    IR 5 milliGRAM(s) Oral every 4 hours PRN  oxyCODONE    IR 10 milliGRAM(s) Oral every 4 hours PRN  pantoprazole    Tablet 40 milliGRAM(s) Oral before breakfast  polyethylene glycol 3350 17 Gram(s) Oral at bedtime  predniSONE   Tablet 2 milliGRAM(s) Oral at bedtime  predniSONE   Tablet 2 milliGRAM(s) Oral daily  senna 2 Tablet(s) Oral at bedtime  simvastatin 20 milliGRAM(s) Oral at bedtime    T(C): 36.5 (01-24-24 @ 18:19), Max: 36.9 (01-24-24 @ 06:57)  HR: 84 (01-24-24 @ 18:19) (76 - 94)  BP: 113/68 (01-24-24 @ 18:19) (104/68 - 125/79)  RR: 18 (01-24-24 @ 18:19) (14 - 19)  SpO2: 94% (01-24-24 @ 18:19) (93% - 99%)  HEENT unremarkable  neck no JVD or bruit  heart normal S1 S2 RRR no gallops or rubs  chest clear to auscultation  abd sof nontender non distended +bs  ext no calf tenderness    A/P   DVT PX  pain control  bowel regimen   wound care as per ortho  GI PX  antiemetics prn  incentive spirometer

## 2024-01-24 NOTE — PHYSICAL THERAPY INITIAL EVALUATION ADULT - BALANCE TRAINING, PT EVAL
Pt will improve static & dynamic standing balance to Good using [Rolling walker] maintaining WB precaution  to perform  Gait independently  in 2 weeks

## 2024-01-24 NOTE — DISCHARGE NOTE PROVIDER - HOSPITAL COURSE
63yFemale with history of L knee OA presenting for L TKA by Dr. Stoddard on 1/24/24. Risk and benefits of surgery were explained to the patient. The patient understood and agreed to proceed with surgery. Patient underwent the procedure with no intraoperative complications. Pt was brought in stable condition to the PACU. Once stable in PACU, pt was brought to the floor. During hospital stay pt was followed by Medicine, physical therapy, Home Care during this admission. Pt is stable for discharge 63yFemale with history of L knee OA presenting for L TKA by Dr. Stoddard on 1/24/24. Risk and benefits of surgery were explained to the patient. The patient understood and agreed to proceed with surgery. Patient underwent the procedure with no intraoperative complications. Pt was brought in stable condition to the PACU. Once stable in PACU, pt was brought to the floor. During hospital stay pt was followed by Medicine, physical therapy, Home Care during this admission. Pt is stable for discharge home with care

## 2024-01-24 NOTE — DISCHARGE NOTE PROVIDER - NSDCMRMEDTOKEN_GEN_ALL_CORE_FT
hydroCHLOROthiazide 12.5 mg oral tablet: 1 tab(s) orally once a day  Multiple Vitamins oral tablet: 1 tab(s) orally once a day  Norvasc 10 mg oral tablet: 1 tab(s) orally once a day  pantoprazole 40 mg oral delayed release tablet: 1 tab(s) orally once a day (before a meal)  predniSONE 2 mg oral delayed release tablet: 1 tab(s) orally once a day am  predniSONE 2 mg oral delayed release tablet: 1 tab(s) orally once a day (at bedtime)  Synthroid 175 mcg (0.175 mg) oral tablet: 1 tab(s) orally once a day  Zocor 20 mg oral tablet: 1 tab(s) orally once a day (at bedtime)   ascorbic acid 500 mg oral tablet: 1 tab(s) orally 2 times a day  CeleBREX 200 mg oral capsule: 1 cap(s) orally 2 times a day Pain, inflammation, MDD: 2  Colace 100 mg oral capsule: 1 cap(s) orally 2 times a day Constipation prevention MDD: 2  diphenhydrAMINE 25 mg oral capsule: 1 cap(s) orally every 6 hours As needed Rash and/or Itching  Eliquis 2.5 mg oral tablet: 1 tab(s) orally 2 times a day  hydroCHLOROthiazide 12.5 mg oral tablet: 1 tab(s) orally once a day  Multiple Vitamins oral tablet: 1 tab(s) orally once a day  Narcan 4 mg/0.1 mL nasal spray: 4 milligram(s) intranasally once Required with opioid Rx for suspected overdose.  Norvasc 10 mg oral tablet: 1 tab(s) orally once a day  oxyCODONE 5 mg oral tablet: 1 tab(s) orally every 4 hours as needed for  moderate pain For severe pain- take 2 tablets. MDD: 8  pantoprazole 40 mg oral delayed release tablet: 1 tab(s) orally once a day To prevent upset stomach. MDD: 1  predniSONE 2 mg oral delayed release tablet: 1 tab(s) orally once a day am  predniSONE 2 mg oral delayed release tablet: 1 tab(s) orally once a day (at bedtime)  Synthroid 175 mcg (0.175 mg) oral tablet: 1 tab(s) orally once a day  Zocor 20 mg oral tablet: 1 tab(s) orally once a day (at bedtime)

## 2024-01-24 NOTE — PHYSICAL THERAPY INITIAL EVALUATION ADULT - RANGE OF MOTION, PT EVAL
Pt will be able to improve ROM of  left knee in all planes  to WFL  to improve independent in  Gait in 4 weeks

## 2024-01-24 NOTE — DISCHARGE NOTE PROVIDER - NSDCCPTREATMENT_GEN_ALL_CORE_FT
PRINCIPAL PROCEDURE  Procedure: Robot-assisted total arthroplasty of knee  Findings and Treatment: left

## 2024-01-24 NOTE — PHYSICAL THERAPY INITIAL EVALUATION ADULT - PLANNED THERAPY INTERVENTIONS, PT EVAL
Pt will be able to negotiate 3 steps with left rail up, right cane independently in 1 week/balance training/bed mobility training/gait training/ROM/strengthening/transfer training

## 2024-01-25 ENCOUNTER — NON-APPOINTMENT (OUTPATIENT)
Age: 64
End: 2024-01-25

## 2024-01-25 ENCOUNTER — TRANSCRIPTION ENCOUNTER (OUTPATIENT)
Age: 64
End: 2024-01-25

## 2024-01-25 VITALS
HEART RATE: 99 BPM | DIASTOLIC BLOOD PRESSURE: 78 MMHG | TEMPERATURE: 98 F | SYSTOLIC BLOOD PRESSURE: 145 MMHG | RESPIRATION RATE: 17 BRPM | OXYGEN SATURATION: 95 %

## 2024-01-25 LAB
ANION GAP SERPL CALC-SCNC: 6 MMOL/L — SIGNIFICANT CHANGE UP (ref 5–17)
BUN SERPL-MCNC: 16 MG/DL — SIGNIFICANT CHANGE UP (ref 7–23)
CALCIUM SERPL-MCNC: 8.8 MG/DL — SIGNIFICANT CHANGE UP (ref 8.5–10.1)
CHLORIDE SERPL-SCNC: 106 MMOL/L — SIGNIFICANT CHANGE UP (ref 96–108)
CO2 SERPL-SCNC: 26 MMOL/L — SIGNIFICANT CHANGE UP (ref 22–31)
CREAT SERPL-MCNC: 0.73 MG/DL — SIGNIFICANT CHANGE UP (ref 0.5–1.3)
EGFR: 91 ML/MIN/1.73M2 — SIGNIFICANT CHANGE UP
GLUCOSE SERPL-MCNC: 147 MG/DL — HIGH (ref 70–99)
HCT VFR BLD CALC: 36 % — SIGNIFICANT CHANGE UP (ref 34.5–45)
HGB BLD-MCNC: 12.4 G/DL — SIGNIFICANT CHANGE UP (ref 11.5–15.5)
MCHC RBC-ENTMCNC: 27.8 PG — SIGNIFICANT CHANGE UP (ref 27–34)
MCHC RBC-ENTMCNC: 34.4 G/DL — SIGNIFICANT CHANGE UP (ref 32–36)
MCV RBC AUTO: 80.7 FL — SIGNIFICANT CHANGE UP (ref 80–100)
NRBC # BLD: 0 /100 WBCS — SIGNIFICANT CHANGE UP (ref 0–0)
PLATELET # BLD AUTO: 257 K/UL — SIGNIFICANT CHANGE UP (ref 150–400)
POTASSIUM SERPL-MCNC: 3.7 MMOL/L — SIGNIFICANT CHANGE UP (ref 3.5–5.3)
POTASSIUM SERPL-SCNC: 3.7 MMOL/L — SIGNIFICANT CHANGE UP (ref 3.5–5.3)
RBC # BLD: 4.46 M/UL — SIGNIFICANT CHANGE UP (ref 3.8–5.2)
RBC # FLD: 13.7 % — SIGNIFICANT CHANGE UP (ref 10.3–14.5)
SODIUM SERPL-SCNC: 138 MMOL/L — SIGNIFICANT CHANGE UP (ref 135–145)
WBC # BLD: 10.35 K/UL — SIGNIFICANT CHANGE UP (ref 3.8–10.5)
WBC # FLD AUTO: 10.35 K/UL — SIGNIFICANT CHANGE UP (ref 3.8–10.5)

## 2024-01-25 RX ORDER — DIPHENHYDRAMINE HCL 50 MG
25 CAPSULE ORAL EVERY 6 HOURS
Refills: 0 | Status: DISCONTINUED | OUTPATIENT
Start: 2024-01-25 | End: 2024-01-25

## 2024-01-25 RX ORDER — PANTOPRAZOLE SODIUM 20 MG/1
1 TABLET, DELAYED RELEASE ORAL
Qty: 30 | Refills: 0
Start: 2024-01-25 | End: 2024-02-23

## 2024-01-25 RX ORDER — APIXABAN 2.5 MG/1
1 TABLET, FILM COATED ORAL
Qty: 28 | Refills: 0
Start: 2024-01-25 | End: 2024-02-07

## 2024-01-25 RX ORDER — NALOXONE HYDROCHLORIDE 4 MG/.1ML
4 SPRAY NASAL
Qty: 1 | Refills: 0
Start: 2024-01-25 | End: 2024-01-25

## 2024-01-25 RX ORDER — OXYCODONE HYDROCHLORIDE 5 MG/1
1 TABLET ORAL
Qty: 42 | Refills: 0
Start: 2024-01-25 | End: 2024-01-31

## 2024-01-25 RX ORDER — DIPHENHYDRAMINE HCL 50 MG
1 CAPSULE ORAL
Qty: 0 | Refills: 0 | DISCHARGE
Start: 2024-01-25

## 2024-01-25 RX ORDER — KETOROLAC TROMETHAMINE 30 MG/ML
30 SYRINGE (ML) INJECTION ONCE
Refills: 0 | Status: DISCONTINUED | OUTPATIENT
Start: 2024-01-25 | End: 2024-01-25

## 2024-01-25 RX ORDER — DOCUSATE SODIUM 100 MG
1 CAPSULE ORAL
Qty: 14 | Refills: 0
Start: 2024-01-25 | End: 2024-01-31

## 2024-01-25 RX ORDER — CELECOXIB 200 MG/1
1 CAPSULE ORAL
Qty: 60 | Refills: 0
Start: 2024-01-25 | End: 2024-02-23

## 2024-01-25 RX ORDER — ASCORBIC ACID 60 MG
1 TABLET,CHEWABLE ORAL
Qty: 0 | Refills: 0 | DISCHARGE
Start: 2024-01-25

## 2024-01-25 RX ORDER — RIVAROXABAN 15 MG-20MG
1 KIT ORAL
Qty: 14 | Refills: 0
Start: 2024-01-25 | End: 2024-02-07

## 2024-01-25 RX ADMIN — Medication 500 MILLIGRAM(S): at 05:48

## 2024-01-25 RX ADMIN — Medication 100 MILLIGRAM(S): at 00:17

## 2024-01-25 RX ADMIN — CELECOXIB 200 MILLIGRAM(S): 200 CAPSULE ORAL at 06:45

## 2024-01-25 RX ADMIN — Medication 30 MILLIGRAM(S): at 11:13

## 2024-01-25 RX ADMIN — PANTOPRAZOLE SODIUM 40 MILLIGRAM(S): 20 TABLET, DELAYED RELEASE ORAL at 05:49

## 2024-01-25 RX ADMIN — Medication 25 MILLIGRAM(S): at 05:47

## 2024-01-25 RX ADMIN — OXYCODONE HYDROCHLORIDE 10 MILLIGRAM(S): 5 TABLET ORAL at 09:51

## 2024-01-25 RX ADMIN — CELECOXIB 200 MILLIGRAM(S): 200 CAPSULE ORAL at 05:48

## 2024-01-25 RX ADMIN — Medication 175 MICROGRAM(S): at 05:48

## 2024-01-25 RX ADMIN — APIXABAN 2.5 MILLIGRAM(S): 2.5 TABLET, FILM COATED ORAL at 05:49

## 2024-01-25 RX ADMIN — Medication 102 MILLIGRAM(S): at 06:09

## 2024-01-25 RX ADMIN — OXYCODONE HYDROCHLORIDE 10 MILLIGRAM(S): 5 TABLET ORAL at 10:51

## 2024-01-25 RX ADMIN — Medication 25 MILLIGRAM(S): at 11:01

## 2024-01-25 RX ADMIN — Medication 1000 MILLIGRAM(S): at 01:15

## 2024-01-25 RX ADMIN — OXYCODONE HYDROCHLORIDE 10 MILLIGRAM(S): 5 TABLET ORAL at 06:45

## 2024-01-25 RX ADMIN — OXYCODONE HYDROCHLORIDE 10 MILLIGRAM(S): 5 TABLET ORAL at 05:47

## 2024-01-25 RX ADMIN — Medication 1 TABLET(S): at 11:02

## 2024-01-25 RX ADMIN — Medication 400 MILLIGRAM(S): at 00:16

## 2024-01-25 RX ADMIN — Medication 30 MILLIGRAM(S): at 10:58

## 2024-01-25 RX ADMIN — Medication 2 MILLIGRAM(S): at 05:51

## 2024-01-25 RX ADMIN — AMLODIPINE BESYLATE 10 MILLIGRAM(S): 2.5 TABLET ORAL at 05:48

## 2024-01-25 NOTE — DISCHARGE NOTE NURSING/CASE MANAGEMENT/SOCIAL WORK - PATIENT PORTAL LINK FT
You can access the FollowMyHealth Patient Portal offered by [facility] by registering at the following website: [email or web address]. By joining [facility]’s FollowMyHealth portal, you will also be able to view your health information using other applications (apps) compatible with our system.

## 2024-01-25 NOTE — DISCHARGE NOTE NURSING/CASE MANAGEMENT/SOCIAL WORK - NSDCPEFALRISK_GEN_ALL_CORE
For information on Fall & Injury Prevention, visit: [email or web address] OR  [email or web address] OR  https://www.cdc.gov/steadi/patient.html

## 2024-01-25 NOTE — PROGRESS NOTE ADULT - SUBJECTIVE AND OBJECTIVE BOX
DANY HENDERSON is a 63y Female s/p ROBOTIC ASSISTED LEFT TOTAL KNEE ARTHROPLASTY WITH LLUVIA        denies any chest pain shortness of breath palpitation dizziness lightheadedness nausea vomiting fever or chills    T(C): 36.9 (01-25-24 @ 12:27), Max: 36.9 (01-25-24 @ 12:27)  HR: 99 (01-25-24 @ 12:27) (74 - 99)  BP: 145/78 (01-25-24 @ 12:27) (109/65 - 145/78)  RR: 17 (01-25-24 @ 12:27) (17 - 18)  SpO2: 95% (01-25-24 @ 12:27) (94% - 98%)  no jvd/bruit  s1 s2 rrr  cta  s/nt/nd  no calf tend                        12.4   10.35 )-----------( 257      ( 25 Jan 2024 07:20 )             36.0   01-25    138  |  106  |  16  ----------------------------<  147<H>  3.7   |  26  |  0.73    Ca    8.8      25 Jan 2024 07:20        cont dvt px  pain control  bowel regimen  antiemetics  incentive spirometer
Patient is 63y y/o Female s/p L TKA POD#0  Patient is seen and examined at bedside.   Pt tolerated procedure well without any intra-op complications.    Pain is controlled.  Denies CP/SOB/Dizziness/N/V/D/HA.     Vital Signs Last 24 Hrs  T(C): 36.6 (24 Jan 2024 12:10), Max: 36.9 (24 Jan 2024 06:57)  T(F): 97.8 (24 Jan 2024 12:10), Max: 98.5 (24 Jan 2024 06:57)  HR: 81 (24 Jan 2024 12:10) (76 - 94)  BP: 119/72 (24 Jan 2024 12:10) (104/68 - 125/79)  BP(mean): 85 (24 Jan 2024 09:56) (83 - 85)  RR: 18 (24 Jan 2024 12:10) (14 - 19)  SpO2: 96% (24 Jan 2024 12:10) (94% - 99%)    Parameters below as of 24 Jan 2024 12:10  Patient On (Oxygen Delivery Method): room air          PHYSICAL EXAM:  General: A&Ox3 NAD  LLE: Dressing C/D/I with ACE wrap in place. Motor intact + EHL/FHL/TA/GS.  Sensation is grossly intact.  Extremity warm, compartments soft, compressible. No calf tenderness. DP 2+   RLE: Motor intact +EHL/FHL/TA/GS. Sensation is grossly intact. Extremity warm, compartments soft, compressible. No calf tenderness. DP2+    Labs:                          13.8   6.37  )-----------( 208      ( 24 Jan 2024 10:19 )             41.2       01-24    140  |  107  |  20  ----------------------------<  125<H>  3.9   |  28  |  1.04    Ca    9.3      24 Jan 2024 10:19        A/P: Patient is a 63y y/o Female s/p L TKA, POD # 0  -wound care, knee extension/leg elevation, cryocuff, isometric exercises, new medications reviewed with pt  -Pain control/analgesia  -Inc spirometry reviewed with pt, demonstrated competence  -DVT prophylaxis with Venodynes/Aspirin 81 BID  -F/U AM Labs  -PT/OT/WBAT  -prophylactic Antibiotic  -medical consult  -DC planning    
Patient is s/p L TKA under spinal anesthesia POD#1. Pt tolerated procedure well without any intra-op complications. Pt doing well at this time.  Denies CP/SOB/Dizziness/N/V/D/HA. Pain is controlled.     Vital Signs Last 24 Hrs  T(C): 36.9 (25 Jan 2024 12:27), Max: 36.9 (25 Jan 2024 12:27)  T(F): 98.4 (25 Jan 2024 12:27), Max: 98.4 (25 Jan 2024 12:27)  HR: 99 (25 Jan 2024 12:27) (74 - 99)  BP: 145/78 (25 Jan 2024 12:27) (109/65 - 145/78)  BP(mean): --  RR: 17 (25 Jan 2024 12:27) (17 - 18)  SpO2: 95% (25 Jan 2024 12:27) (94% - 98%)    Parameters below as of 25 Jan 2024 12:27  Patient On (Oxygen Delivery Method): room air        GEN: NAD  L Knee: Dressing C/D/I.   B/L LE's: Motor intact + EHL/FHL/TA/GS in the BL LE. Sensation is grossly intact B/L. Extremities warm B/L. Compartments soft, compressible B/L, no calf tenderness B/L. DP 2+ B/L.    Labs:                          12.4   10.35 )-----------( 257      ( 25 Jan 2024 07:20 )             36.0       01-25    138  |  106  |  16  ----------------------------<  147<H>  3.7   |  26  |  0.73    Ca    8.8      25 Jan 2024 07:20        A/P: Patient is a 63y y/o Female s/p L TKA, POD #1  -wound care, isometric exercises, GI motility, new medications, hospital course and discharge planning reviewed with pt  -Pain control/analgesia  -Inc spirometry reviewed and counseled  -SCD's to B/L LE  -PT/OT/WBAT  -Antibiotic 24hours post-op  -Anticoagulation: Eliquis starting POD#1  Discharge: Home with care

## 2024-01-25 NOTE — DISCHARGE NOTE NURSING/CASE MANAGEMENT/SOCIAL WORK - NSDCFUADDAPPT_GEN_ALL_CORE_FT
Follow up with your surgeon in two weeks. Call for appointment.  If you need more pain medication, call your surgeon's office. For medication refills or authorizations, please call [phone]  We recommend that you call and schedule a follow up appointment within 2-4 weeks with your primary care physician for repeat blood work (CBC and BMP) for post hospital discharge follow-up care.  Call your surgeon if you have increased redness/pain/drainage or fever. Return to ER for shortness of breath/calf tenderness.

## 2024-01-26 ENCOUNTER — TRANSCRIPTION ENCOUNTER (OUTPATIENT)
Age: 64
End: 2024-01-26

## 2024-01-30 ENCOUNTER — TRANSCRIPTION ENCOUNTER (OUTPATIENT)
Age: 64
End: 2024-01-30

## 2024-01-31 DIAGNOSIS — Z79.899 OTHER LONG TERM (CURRENT) DRUG THERAPY: ICD-10-CM

## 2024-01-31 DIAGNOSIS — Z86.718 PERSONAL HISTORY OF OTHER VENOUS THROMBOSIS AND EMBOLISM: ICD-10-CM

## 2024-01-31 DIAGNOSIS — Z79.52 LONG TERM (CURRENT) USE OF SYSTEMIC STEROIDS: ICD-10-CM

## 2024-01-31 DIAGNOSIS — Z96.651 PRESENCE OF RIGHT ARTIFICIAL KNEE JOINT: ICD-10-CM

## 2024-01-31 DIAGNOSIS — M17.12 UNILATERAL PRIMARY OSTEOARTHRITIS, LEFT KNEE: ICD-10-CM

## 2024-01-31 DIAGNOSIS — I10 ESSENTIAL (PRIMARY) HYPERTENSION: ICD-10-CM

## 2024-01-31 DIAGNOSIS — Z88.5 ALLERGY STATUS TO NARCOTIC AGENT: ICD-10-CM

## 2024-01-31 DIAGNOSIS — M25.762 OSTEOPHYTE, LEFT KNEE: ICD-10-CM

## 2024-01-31 DIAGNOSIS — E78.5 HYPERLIPIDEMIA, UNSPECIFIED: ICD-10-CM

## 2024-01-31 DIAGNOSIS — Z88.0 ALLERGY STATUS TO PENICILLIN: ICD-10-CM

## 2024-01-31 DIAGNOSIS — M06.9 RHEUMATOID ARTHRITIS, UNSPECIFIED: ICD-10-CM

## 2024-01-31 DIAGNOSIS — Z90.49 ACQUIRED ABSENCE OF OTHER SPECIFIED PARTS OF DIGESTIVE TRACT: ICD-10-CM

## 2024-01-31 DIAGNOSIS — F41.9 ANXIETY DISORDER, UNSPECIFIED: ICD-10-CM

## 2024-01-31 DIAGNOSIS — Z79.890 HORMONE REPLACEMENT THERAPY: ICD-10-CM

## 2024-01-31 DIAGNOSIS — E03.9 HYPOTHYROIDISM, UNSPECIFIED: ICD-10-CM

## 2024-02-02 ENCOUNTER — APPOINTMENT (OUTPATIENT)
Dept: ORTHOPEDIC SURGERY | Facility: CLINIC | Age: 64
End: 2024-02-02

## 2024-02-03 ENCOUNTER — NON-APPOINTMENT (OUTPATIENT)
Age: 64
End: 2024-02-03

## 2024-02-05 ENCOUNTER — NON-APPOINTMENT (OUTPATIENT)
Age: 64
End: 2024-02-05

## 2024-02-05 DIAGNOSIS — G47.00 INSOMNIA, UNSPECIFIED: ICD-10-CM

## 2024-02-06 ENCOUNTER — RESULT REVIEW (OUTPATIENT)
Age: 64
End: 2024-02-06

## 2024-02-06 ENCOUNTER — APPOINTMENT (OUTPATIENT)
Dept: ORTHOPEDIC SURGERY | Facility: CLINIC | Age: 64
End: 2024-02-06
Payer: MEDICARE

## 2024-02-06 PROCEDURE — 73562 X-RAY EXAM OF KNEE 3: CPT | Mod: LT

## 2024-02-06 PROCEDURE — 99024 POSTOP FOLLOW-UP VISIT: CPT

## 2024-02-06 NOTE — IMAGING
[All Views] : anteroposterior, lateral, skyline, and anteroposterior standing [Components well fixed, in good position] : Components well fixed, in good position

## 2024-02-06 NOTE — HISTORY OF PRESENT ILLNESS
[Gradual] : gradual [5] : 5 [4] : 4 [Localized] : localized [Intermittent] : intermittent [Household chores] : household chores [Rest] : rest [Walking] : walking [] : yes [de-identified] : 02/06/24 FOLLOW UP S/P LEFT TKA DONE 01/24/24 BY DR BOLES DOING WELL  WAS TOLD BY HER PCP TO STOP XERALTO AND CELEBREX AFTER 1 WEEK.  HX OF DVT YEARS AGO AFTER KNEE SURGERY.  ON PREDNISONE FOR PMR [FreeTextEntry1] : LEFT KNEE  [de-identified] : SX

## 2024-02-06 NOTE — PHYSICAL EXAM
[Left] : left knee [] : no tenderness [TWNoteComboBox7] : flexion 110 degrees [de-identified] : extension 0 degrees

## 2024-02-06 NOTE — ASSESSMENT
[FreeTextEntry1] : S/P LT TKA 1/24/24: NO F/C/S. DOING WELL. WAS TOLD BY HER PCP TO STOP TAKING THE XARELTO AND CELEBREX 8 DAYS AFTER SURGERY. XRAYS REVIEWED WITH COMPONENTS WELL FIXED. NO SIGNS OF INFECTION. GOOD LIGAMENT BALANCE ON EXAM.  DISCUSSED THE IMPORTANCE OF ELEVATION TO REDUCE SWELLING. PROPER ELEVATION TECHNIQUES DISCUSSED. ABX AND PRECAUTIONS REVIEWED. PT RX. QUESTIONS ANSWERED.   WILL ORDER STAT VENOUS DOPPLER LLE DUE TO SWELLING POST OP, R/O DVT.

## 2024-02-07 LAB
ANION GAP SERPL CALC-SCNC: 17 MMOL/L
BASOPHILS # BLD AUTO: 0.04 K/UL
BASOPHILS NFR BLD AUTO: 0.4 %
BUN SERPL-MCNC: 31 MG/DL
CALCIUM SERPL-MCNC: 9.8 MG/DL
CHLORIDE SERPL-SCNC: 100 MMOL/L
CO2 SERPL-SCNC: 26 MMOL/L
CREAT SERPL-MCNC: 0.91 MG/DL
EGFR: 70 ML/MIN/1.73M2
EOSINOPHIL # BLD AUTO: 0.28 K/UL
EOSINOPHIL NFR BLD AUTO: 2.7 %
GLUCOSE SERPL-MCNC: 94 MG/DL
HCT VFR BLD CALC: 41.9 %
HGB BLD-MCNC: 13.6 G/DL
IMM GRANULOCYTES NFR BLD AUTO: 0.6 %
LYMPHOCYTES # BLD AUTO: 1.24 K/UL
LYMPHOCYTES NFR BLD AUTO: 12.1 %
MAN DIFF?: NORMAL
MCHC RBC-ENTMCNC: 27.1 PG
MCHC RBC-ENTMCNC: 32.5 GM/DL
MCV RBC AUTO: 83.6 FL
MONOCYTES # BLD AUTO: 0.65 K/UL
MONOCYTES NFR BLD AUTO: 6.3 %
NEUTROPHILS # BLD AUTO: 8.01 K/UL
NEUTROPHILS NFR BLD AUTO: 77.9 %
PLATELET # BLD AUTO: 438 K/UL
POTASSIUM SERPL-SCNC: 4 MMOL/L
RBC # BLD: 5.01 M/UL
RBC # FLD: 14.7 %
SODIUM SERPL-SCNC: 143 MMOL/L
WBC # FLD AUTO: 10.28 K/UL

## 2024-02-09 ENCOUNTER — TRANSCRIPTION ENCOUNTER (OUTPATIENT)
Age: 64
End: 2024-02-09

## 2024-02-15 ENCOUNTER — RX RENEWAL (OUTPATIENT)
Age: 64
End: 2024-02-15

## 2024-02-15 ENCOUNTER — TRANSCRIPTION ENCOUNTER (OUTPATIENT)
Age: 64
End: 2024-02-15

## 2024-02-26 ENCOUNTER — TRANSCRIPTION ENCOUNTER (OUTPATIENT)
Age: 64
End: 2024-02-26

## 2024-03-07 ENCOUNTER — APPOINTMENT (OUTPATIENT)
Dept: ORTHOPEDIC SURGERY | Facility: CLINIC | Age: 64
End: 2024-03-07
Payer: MEDICARE

## 2024-03-07 VITALS — WEIGHT: 248 LBS | HEIGHT: 65 IN | BODY MASS INDEX: 41.32 KG/M2

## 2024-03-07 DIAGNOSIS — G57.81 OTHER SPECIFIED MONONEUROPATHIES OF RIGHT LOWER LIMB: ICD-10-CM

## 2024-03-07 DIAGNOSIS — M20.41 OTHER HAMMER TOE(S) (ACQUIRED), RIGHT FOOT: ICD-10-CM

## 2024-03-07 DIAGNOSIS — M77.41 METATARSALGIA, RIGHT FOOT: ICD-10-CM

## 2024-03-07 PROCEDURE — J3490M: CUSTOM | Mod: NC

## 2024-03-07 PROCEDURE — 20600 DRAIN/INJ JOINT/BURSA W/O US: CPT | Mod: 79,RT

## 2024-03-07 PROCEDURE — 99214 OFFICE O/P EST MOD 30 MIN: CPT | Mod: 25

## 2024-03-07 NOTE — PHYSICAL EXAM
[Right] : right foot and ankle [2nd] : 2nd [] : intermetatarsal space tenderness [3rd] : 3rd [NL (20)] : dorsiflexion 20 degrees [NL (40)] : plantar flexion 40 degrees [2+] : dorsalis pedis pulse: 2+

## 2024-03-07 NOTE — DATA REVIEWED
[Right] : of the right [MRI] : MRI [Foot] : foot [I independently reviewed and interpreted images and report] : I independently reviewed and interpreted images and report [I reviewed the films/CD and agree] : I reviewed the films/CD and agree [FreeTextEntry1] : 1/9/24: Full-thickness tear of the second MTP plantar plate with dorsal dislocation of the second proximal phalanx with respect to the second metatarsal head.  Large third interspace neuroma.

## 2024-03-07 NOTE — DISCUSSION/SUMMARY
[de-identified] : Discussed injection and surgery.  Not interested in ht correction. She is S/P L TKA  R 3rd interspace neuroma injection tolerated well  metatarsal pads f/u as needed

## 2024-03-07 NOTE — HISTORY OF PRESENT ILLNESS
[8] : 8 [Dull/Aching] : dull/aching [Constant] : constant [Household chores] : household chores [Social interactions] : social interactions [Meds] : meds [Heat] : heat [Walking] : walking [de-identified] : 3/7/24 - R foot pain and deformity, worsening swelling since 2022. There is numbness to right toes 2-3. Pain seemed to be aggravated by PT for the knee.   Seen by Dr Stoddard and referred here. An MRI was ordered. S/P L TKA 1/24/24 with Dr Stoddard. She is considering surgery once she is recovered from her knee replacement surgery.  [] : no [FreeTextEntry1] : RT FOOT [FreeTextEntry6] : ''swelling''

## 2024-03-14 NOTE — ASU PREOP CHECKLIST - LOOSE TEETH
Specialty Pharmacy Refill Coordination Note     ZAVZPRET SPR 10MG is approved through 03/14/2025.       Caro Kinney  Specialty Pharmacy Technician         no

## 2024-03-19 ENCOUNTER — APPOINTMENT (OUTPATIENT)
Dept: ORTHOPEDIC SURGERY | Facility: CLINIC | Age: 64
End: 2024-03-19
Payer: MEDICARE

## 2024-03-19 VITALS — HEIGHT: 65 IN | WEIGHT: 248 LBS | BODY MASS INDEX: 41.32 KG/M2

## 2024-03-19 DIAGNOSIS — Z96.652 PRESENCE OF LEFT ARTIFICIAL KNEE JOINT: ICD-10-CM

## 2024-03-19 DIAGNOSIS — M54.42 LUMBAGO WITH SCIATICA, LEFT SIDE: ICD-10-CM

## 2024-03-19 PROCEDURE — 99213 OFFICE O/P EST LOW 20 MIN: CPT | Mod: 24

## 2024-03-19 PROCEDURE — 99024 POSTOP FOLLOW-UP VISIT: CPT

## 2024-03-19 NOTE — ASSESSMENT
[FreeTextEntry1] : S/P LT TKA 1/24/24: NO F/C/S. DOING WELL. XRAYS REVIEWED WITH COMPONENTS WELL FIXED. NO SIGNS OF INFECTION. GOOD LIGAMENT BALANCE ON EXAM. ABX AND PRECAUTIONS REVIEWED. PT RX. QUESTIONS ANSWERED.   ALSO WITH SEVERE LUMBAR BACK PAIN WITH RADIC. HAS AN EPIDURAL SCHEDULED WITH HER PAIN MANAGEMENT DOCTOR. OK TO PROCEED. WILL ADD LUMBAR TO PT SCRIPT. WILL FOLLOW UP IN 3 MONTHS FOR KNEE WITH REPEAT XRAYS.

## 2024-03-19 NOTE — PHYSICAL EXAM
[Left] : left knee [] : no tenderness [TWNoteComboBox7] : flexion 110 degrees [de-identified] : extension 0 degrees

## 2024-03-19 NOTE — HISTORY OF PRESENT ILLNESS
[Gradual] : gradual [5] : 5 [4] : 4 [Localized] : localized [Intermittent] : intermittent [Household chores] : household chores [Rest] : rest [Walking] : walking [] : yes [de-identified] : 02/06/24 FOLLOW UP S/P LEFT TKA DONE 01/24/24 BY DR BOLES DOING WELL  WAS TOLD BY HER PCP TO STOP XERALTO AND CELEBREX AFTER 1 WEEK.  HX OF DVT YEARS AGO AFTER KNEE SURGERY.  ON PREDNISONE FOR PMR  3.19.2024 POST OP VISIT FOR LEFT KNEE, THEYRE CONTUINUING PT [FreeTextEntry1] : LEFT KNEE  [de-identified] : SX

## 2024-03-29 ENCOUNTER — NON-APPOINTMENT (OUTPATIENT)
Age: 64
End: 2024-03-29

## 2024-04-19 ENCOUNTER — NON-APPOINTMENT (OUTPATIENT)
Age: 64
End: 2024-04-19

## 2024-05-01 ENCOUNTER — APPOINTMENT (OUTPATIENT)
Dept: OPHTHALMOLOGY | Facility: CLINIC | Age: 64
End: 2024-05-01
Payer: MEDICARE

## 2024-05-01 ENCOUNTER — NON-APPOINTMENT (OUTPATIENT)
Age: 64
End: 2024-05-01

## 2024-05-01 PROCEDURE — 92020 GONIOSCOPY: CPT

## 2024-05-01 PROCEDURE — 92012 INTRM OPH EXAM EST PATIENT: CPT

## 2024-05-20 ENCOUNTER — RX RENEWAL (OUTPATIENT)
Age: 64
End: 2024-05-20

## 2024-05-20 RX ORDER — HYDROCHLOROTHIAZIDE 12.5 MG/1
12.5 CAPSULE ORAL
Qty: 90 | Refills: 0 | Status: ACTIVE | COMMUNITY
Start: 2022-11-22 | End: 1900-01-01

## 2024-05-21 ENCOUNTER — APPOINTMENT (OUTPATIENT)
Dept: INTERNAL MEDICINE | Facility: CLINIC | Age: 64
End: 2024-05-21
Payer: MEDICARE

## 2024-05-21 VITALS
OXYGEN SATURATION: 97 % | WEIGHT: 246 LBS | HEART RATE: 111 BPM | SYSTOLIC BLOOD PRESSURE: 120 MMHG | HEIGHT: 65 IN | BODY MASS INDEX: 40.98 KG/M2 | DIASTOLIC BLOOD PRESSURE: 70 MMHG | TEMPERATURE: 98.3 F

## 2024-05-21 DIAGNOSIS — J32.9 CHRONIC SINUSITIS, UNSPECIFIED: ICD-10-CM

## 2024-05-21 PROCEDURE — G2211 COMPLEX E/M VISIT ADD ON: CPT

## 2024-05-21 PROCEDURE — 99213 OFFICE O/P EST LOW 20 MIN: CPT

## 2024-05-21 RX ORDER — ZOLPIDEM TARTRATE 10 MG/1
10 TABLET ORAL
Qty: 30 | Refills: 3 | Status: DISCONTINUED | COMMUNITY
Start: 2024-02-05 | End: 2024-05-21

## 2024-05-21 RX ORDER — PREDNISONE 1 MG/1
1 TABLET ORAL DAILY
Refills: 0 | Status: DISCONTINUED | COMMUNITY
End: 2024-05-21

## 2024-05-21 RX ORDER — OXYCODONE 5 MG/1
5 TABLET ORAL EVERY 6 HOURS
Qty: 40 | Refills: 0 | Status: DISCONTINUED | COMMUNITY
Start: 2024-02-06 | End: 2024-05-21

## 2024-05-21 NOTE — ASSESSMENT
[FreeTextEntry1] : This is a 63-year-old female with history of polymyalgia rheumatica who presents with 2 weeks of chest pain and shortness of breath  Patient states that about 2 weeks ago she started getting nasal fullness congestion headache.  3 to 4 days later she developed a cough the cough is been relentless keeping her up at night.  Last night she felt that she was suffering from reflux and had difficulty breathing because she felt fullness in her mouth.  She states that she still is coughing but has no shortness of breath at this time.  She did take a COVID test on the way in which was negative  My clinical impression is that this is probably sinusitis we will start her on doxycycline 100 mg twice a day Flonase.  I will give her some Tensilon she is allergic to penicillins  We will increase her PPI to twice daily Diya sleeps sitting for the time being

## 2024-05-21 NOTE — REVIEW OF SYSTEMS
[Fatigue] : fatigue [Shortness Of Breath] : shortness of breath [Cough] : cough [Negative] : Musculoskeletal [FreeTextEntry4] : Sinus congestion

## 2024-05-21 NOTE — HISTORY OF PRESENT ILLNESS
[FreeTextEntry8] : This is a 63-year-old female with history of polymyalgia rheumatica who presents with 2 weeks of chest pain and shortness of breath  Patient states that about 2 weeks ago she started getting nasal fullness congestion headache.  3 to 4 days later she developed a cough the cough is been relentless keeping her up at night.  Last night she felt that she was suffering from reflux and had difficulty breathing because she felt fullness in her mouth.  She states that she still is coughing but has no shortness of breath at this time.  She did take a COVID test on the way in which was negative

## 2024-05-22 LAB
INFLUENZA A RESULT: NOT DETECTED
INFLUENZA B RESULT: NOT DETECTED
RESP SYN VIRUS RESULT: NOT DETECTED
SARS-COV-2 RESULT: NOT DETECTED

## 2024-05-23 ENCOUNTER — APPOINTMENT (OUTPATIENT)
Dept: PULMONOLOGY | Facility: CLINIC | Age: 64
End: 2024-05-23
Payer: MEDICARE

## 2024-05-23 VITALS — SYSTOLIC BLOOD PRESSURE: 134 MMHG | OXYGEN SATURATION: 98 % | HEART RATE: 107 BPM | DIASTOLIC BLOOD PRESSURE: 84 MMHG

## 2024-05-23 DIAGNOSIS — G47.30 SLEEP APNEA, UNSPECIFIED: ICD-10-CM

## 2024-05-23 LAB — POCT - HEMOGLOBIN (HGB), QUANTITATIVE, TRANSCUTANEOUS: 14.6

## 2024-05-23 PROCEDURE — 94729 DIFFUSING CAPACITY: CPT

## 2024-05-23 PROCEDURE — 94727 GAS DIL/WSHOT DETER LNG VOL: CPT

## 2024-05-23 PROCEDURE — 88738 HGB QUANT TRANSCUTANEOUS: CPT

## 2024-05-23 PROCEDURE — 71046 X-RAY EXAM CHEST 2 VIEWS: CPT

## 2024-05-23 PROCEDURE — ZZZZZ: CPT

## 2024-05-23 PROCEDURE — 95012 NITRIC OXIDE EXP GAS DETER: CPT

## 2024-05-23 PROCEDURE — 99205 OFFICE O/P NEW HI 60 MIN: CPT | Mod: 25

## 2024-05-23 PROCEDURE — 94010 BREATHING CAPACITY TEST: CPT

## 2024-05-23 PROCEDURE — 94762 N-INVAS EAR/PLS OXIMTRY CONT: CPT

## 2024-05-23 RX ORDER — METHYLPREDNISOLONE 4 MG/1
4 TABLET ORAL
Qty: 1 | Refills: 0 | Status: ACTIVE | COMMUNITY
Start: 2024-05-23 | End: 1900-01-01

## 2024-05-23 NOTE — CONSULT LETTER
[Dear  ___] : Dear  [unfilled], [Consult Letter:] : I had the pleasure of evaluating your patient, [unfilled]. [Please see my note below.] : Please see my note below. [Consult Closing:] : Thank you very much for allowing me to participate in the care of this patient.  If you have any questions, please do not hesitate to contact me. [Sincerely,] : Sincerely, [FreeTextEntry3] : Jin Shah D.O., ADILENE.  of Medicine [facility]

## 2024-05-23 NOTE — PROCEDURE
[FreeTextEntry1] : Chest x-ray PA lateral May 23, 2024 Heart size is normal Lung fields are grossly clear Or no parenchymal infiltrates pleural effusions dominant pulmonary nodules Soft tissue bony structures grossly normal Livier mediastinum unremarkable Overall impression clear lungs  PFT May 23, 2024 Spirometric flow rates are normal range There is some variability of the flow-volume loop Lung volumes Normal total lung capacity Decreased ERV secondary to truncal obesity Diffusion normal range 88% predicted Hemoglobin 14.6

## 2024-05-23 NOTE — PHYSICAL EXAM
[No Acute Distress] : no acute distress [Low Lying Soft Palate] : low lying soft palate [IV] : Mallampati Class: IV [Normal Appearance] : normal appearance [Supple] : supple [No JVD] : no jvd [Normal Rate/Rhythm] : normal rate/rhythm [Normal S1, S2] : normal s1, s2 [No Murmurs] : no murmurs [No Rubs] : no rubs [No Gallops] : no gallops [No Resp Distress] : no resp distress [No Acc Muscle Use] : no acc muscle use [Normal Palpation] : normal palpation [Normal Rhythm and Effort] : normal rhythm and effort [Clear to Auscultation Bilaterally] : clear to auscultation bilaterally [Normal to Percussion] : normal to percussion [No Abnormalities] : no abnormalities [Benign] : benign [Not Tender] : not tender [Soft] : soft [No HSM] : no hsm [Normal Bowel Sounds] : normal bowel sounds [Normal Gait] : normal gait [No Clubbing] : no clubbing [No Cyanosis] : no cyanosis [No Edema] : no edema [Normal Color/ Pigmentation] : normal color/ pigmentation [No Focal Deficits] : no focal deficits [Oriented x3] : oriented x3 [Normal Affect] : normal affect [TextBox_2] : Overweight

## 2024-05-23 NOTE — HISTORY OF PRESENT ILLNESS
[Never] : never [TextBox_4] : 63-year-old patient Pulmonary evaluation Sleep disordered breathing Cough Dyspnea No prior pulmonary history reported Does not report a history of asthma pneumonia COPD bronchitis emphysema obstructive sleep apnea History noted the myalgia rheumatica possible acid reflux disease chronic sinus disease hyperlipidemia hypertension hypothyroidism Patient states the onset within the last week including this past weekend of awakening feeling like she is gasping for breath choking episodes She feels like there is sandpaper in her throat There is an associated cough She feels like there is episodes where her heart rate is increased She states she has some associated wheeze feels like her chest is making noise This is most often in bed supine She is taking medications including PPI Tessalon Perles She is also used Mucinex and Robitussin She states in the past she was told with having a surgical procedure as per anesthesia that she has a thin airway She sleeps alone and is unaware if she has loud snoring No purulent sputum No hemoptysis No recent fevers chills or sweats Home rapid COVID antigen negative No history of tobacco use No history of chemical toxic inhalational exposures No recent travel

## 2024-05-23 NOTE — DISCUSSION/SUMMARY
[FreeTextEntry1] : Sleep disordered breathing Nocturnal wheeze reported There are signs and symptoms of obstructive sleep apnea but but the symptoms are out of proportion I am concerned there is some anxiety component Rule out nocturnal GERD when supine but noted patient is on PPI Other history is reviewed Recommendations Overnight oximetry Formal sleep study CT chest inspiratory and expiratory rule out tracheomalacia Will get a Medrol Dosepak for short-term for the nocturnal wheeze I reviewed in detail with the patient unremarkable chest x-ray with normal pulmonary physiology normal nitric oxide test Reassurance [place]

## 2024-05-23 NOTE — REVIEW OF SYSTEMS
[Fatigue] : fatigue [Postnasal Drip] : postnasal drip [Sinus Problems] : sinus problems [Dyspnea] : dyspnea [Cough] : cough [Seasonal Allergies] : seasonal allergies [Negative] : Neurologic [Depression] : no depression [Anxiety] : no anxiety [Diabetes] : no diabetes [TextBox_144] : hypothyroidism

## 2024-05-24 ENCOUNTER — OUTPATIENT (OUTPATIENT)
Dept: OUTPATIENT SERVICES | Facility: HOSPITAL | Age: 64
LOS: 1 days | End: 2024-05-24
Payer: MEDICARE

## 2024-05-24 ENCOUNTER — APPOINTMENT (OUTPATIENT)
Dept: CT IMAGING | Facility: IMAGING CENTER | Age: 64
End: 2024-05-24
Payer: MEDICARE

## 2024-05-24 DIAGNOSIS — Z98.890 OTHER SPECIFIED POSTPROCEDURAL STATES: Chronic | ICD-10-CM

## 2024-05-24 DIAGNOSIS — Z96.651 PRESENCE OF RIGHT ARTIFICIAL KNEE JOINT: Chronic | ICD-10-CM

## 2024-05-24 DIAGNOSIS — S89.91XA UNSPECIFIED INJURY OF RIGHT LOWER LEG, INITIAL ENCOUNTER: Chronic | ICD-10-CM

## 2024-05-24 DIAGNOSIS — G47.30 SLEEP APNEA, UNSPECIFIED: ICD-10-CM

## 2024-05-24 PROCEDURE — 71250 CT THORAX DX C-: CPT

## 2024-05-24 PROCEDURE — 71250 CT THORAX DX C-: CPT | Mod: 26,MH

## 2024-05-25 ENCOUNTER — NON-APPOINTMENT (OUTPATIENT)
Age: 64
End: 2024-05-25

## 2024-05-26 ENCOUNTER — NON-APPOINTMENT (OUTPATIENT)
Age: 64
End: 2024-05-26

## 2024-05-29 ENCOUNTER — APPOINTMENT (OUTPATIENT)
Dept: PULMONOLOGY | Facility: CLINIC | Age: 64
End: 2024-05-29
Payer: MEDICARE

## 2024-05-29 VITALS — SYSTOLIC BLOOD PRESSURE: 118 MMHG | HEART RATE: 101 BPM | OXYGEN SATURATION: 95 % | DIASTOLIC BLOOD PRESSURE: 76 MMHG

## 2024-05-29 PROCEDURE — 99214 OFFICE O/P EST MOD 30 MIN: CPT

## 2024-05-29 PROCEDURE — 95800 SLP STDY UNATTENDED: CPT

## 2024-05-29 RX ORDER — PANTOPRAZOLE 40 MG/1
40 TABLET, DELAYED RELEASE ORAL TWICE DAILY
Qty: 2 | Refills: 0 | Status: ACTIVE | COMMUNITY
Start: 2022-09-23 | End: 1900-01-01

## 2024-05-29 NOTE — DISCUSSION/SUMMARY
[FreeTextEntry1] : Nocturnal hypoxemia Sleep disordered breathing Nocturnal wheeze reported There are signs and symptoms of obstructive sleep apnea but but the symptoms are out of proportion I am concerned there is some anxiety component Rule out nocturnal GERD when supine but noted patient is on PPI Other history is reviewed Recommendations Overnight oximetry completed Will now order formal sleep study patient request home  CT chest inspiratory and expiratory rule out tracheomalacia results pending Will get a Medrol Dosepak for short-term for the nocturnal wheeze no clear significant improvement as per patient I reviewed in detail with the patient unremarkable chest x-ray with normal pulmonary physiology normal nitric oxide test Reassurance Discussed that if we confirm a high clinical suspicion for obstructive sleep apnea with a set of a CPAP device On CPAP with then repeat overnight oximetry to see if nocturnal oxygen is still needed For the short-term we will set up nocturnal oxygen therapy protocol 2 L with sleep

## 2024-05-29 NOTE — PROCEDURE
[FreeTextEntry1] : Chest x-ray PA lateral May 23, 2024 Heart size is normal Lung fields are grossly clear no parenchymal infiltrates pleural effusions dominant pulmonary nodules Soft tissue bony structures grossly normal Livier mediastinum unremarkable Overall impression clear lungs  PFT May 23, 2024 Spirometric flow rates are normal range There is some variability of the flow-volume loop Lung volumes Normal total lung capacity Decreased ERV secondary to truncal obesity Diffusion normal range 88% predicted Hemoglobin 14.6

## 2024-05-29 NOTE — CONSULT LETTER
[Dear  ___] : Dear  [unfilled], [Consult Letter:] : I had the pleasure of evaluating your patient, [unfilled]. [Please see my note below.] : Please see my note below. [Consult Closing:] : Thank you very much for allowing me to participate in the care of this patient.  If you have any questions, please do not hesitate to contact me. [Sincerely,] : Sincerely, [FreeTextEntry3] : Jin Shah D.O., ADILENE.  of Medicine [facility]

## 2024-05-29 NOTE — HISTORY OF PRESENT ILLNESS
[Never] : never [TextBox_4] : 63-year-old patient Pulmonary evaluation Sleep disordered breathing Completed overnight oximetry data review below does demonstrate nocturnal hypoxemia Cough Dyspnea No prior pulmonary history reported Does not report a history of asthma pneumonia COPD bronchitis emphysema obstructive sleep apnea History noted the myalgia rheumatica possible acid reflux disease chronic sinus disease hyperlipidemia hypertension hypothyroidism Patient states the onset within the last week including this past weekend of awakening feeling like she is gasping for breath choking episodes She feels like there is sandpaper in her throat There is an associated cough She feels like there is episodes where her heart rate is increased She states she has some associated wheeze feels like her chest is making noise This is most often in bed supine She is taking medications including PPI Tessalon Perles She is also used Mucinex and Robitussin She states in the past she was told with having a surgical procedure as per anesthesia that she has a thin airway She sleeps alone and is unaware if she has loud snoring No purulent sputum No hemoptysis No recent fevers chills or sweats Home rapid COVID antigen negative No history of tobacco use No history of chemical toxic inhalational exposures No recent travel

## 2024-05-29 NOTE — REVIEW OF SYSTEMS
[Fatigue] : fatigue [Postnasal Drip] : postnasal drip [Sinus Problems] : sinus problems [Cough] : cough [Dyspnea] : dyspnea [Seasonal Allergies] : seasonal allergies [Negative] : Neurologic [Depression] : no depression [Anxiety] : no anxiety [Diabetes] : no diabetes [TextBox_144] : hypothyroidism

## 2024-05-30 PROCEDURE — 95800 SLP STDY UNATTENDED: CPT

## 2024-06-03 ENCOUNTER — NON-APPOINTMENT (OUTPATIENT)
Age: 64
End: 2024-06-03

## 2024-06-11 ENCOUNTER — APPOINTMENT (OUTPATIENT)
Dept: GASTROENTEROLOGY | Facility: CLINIC | Age: 64
End: 2024-06-11
Payer: MEDICARE

## 2024-06-11 VITALS
SYSTOLIC BLOOD PRESSURE: 139 MMHG | HEART RATE: 107 BPM | WEIGHT: 254.03 LBS | BODY MASS INDEX: 42.33 KG/M2 | HEIGHT: 65 IN | DIASTOLIC BLOOD PRESSURE: 90 MMHG

## 2024-06-11 DIAGNOSIS — R05.9 COUGH, UNSPECIFIED: ICD-10-CM

## 2024-06-11 DIAGNOSIS — Z86.39 PERSONAL HISTORY OF OTHER ENDOCRINE, NUTRITIONAL AND METABOLIC DISEASE: ICD-10-CM

## 2024-06-11 DIAGNOSIS — R13.10 DYSPHAGIA, UNSPECIFIED: ICD-10-CM

## 2024-06-11 DIAGNOSIS — Z12.12 ENCOUNTER FOR SCREENING FOR MALIGNANT NEOPLASM OF COLON: ICD-10-CM

## 2024-06-11 DIAGNOSIS — Z12.11 ENCOUNTER FOR SCREENING FOR MALIGNANT NEOPLASM OF COLON: ICD-10-CM

## 2024-06-11 PROCEDURE — 82274 ASSAY TEST FOR BLOOD FECAL: CPT | Mod: QW

## 2024-06-11 PROCEDURE — 99204 OFFICE O/P NEW MOD 45 MIN: CPT | Mod: 25

## 2024-06-11 RX ORDER — BENZONATATE 200 MG/1
200 CAPSULE ORAL 3 TIMES DAILY
Qty: 30 | Refills: 0 | Status: DISCONTINUED | COMMUNITY
Start: 2024-05-21 | End: 2024-06-11

## 2024-06-11 RX ORDER — DOXYCYCLINE HYCLATE 100 MG/1
100 CAPSULE ORAL
Qty: 14 | Refills: 0 | Status: DISCONTINUED | COMMUNITY
Start: 2024-05-21 | End: 2024-06-11

## 2024-06-11 NOTE — CONSULT LETTER
[Dear  ___] : Dear  [unfilled], [Consult Letter:] : I had the pleasure of evaluating your patient, [unfilled]. [Please see my note below.] : Please see my note below. [Consult Closing:] : Thank you very much for allowing me to participate in the care of this patient.  If you have any questions, please do not hesitate to contact me. [Sincerely,] : Sincerely, [FreeTextEntry3] : Stewart Garcia M.D. [DrLoly  ___] : Dr. MCCANN

## 2024-06-11 NOTE — REVIEW OF SYSTEMS
[Shortness Of Breath] : shortness of breath [Cough] : cough [Negative] : Heme/Lymph [Hoarseness] : hoarseness [As Noted in HPI] : as noted in HPI [FreeTextEntry6] : acid reflux [FreeTextEntry7] : swallowing issues, GERD

## 2024-06-11 NOTE — PHYSICAL EXAM
[Alert] : alert [Well Developed] : well developed [Well Nourished] : well nourished [No Respiratory Distress] : no respiratory distress [Respiration, Rhythm And Depth] : normal respiratory rhythm and effort [Auscultation Breath Sounds / Voice Sounds] : lungs were clear to auscultation bilaterally [Heart Rate And Rhythm] : heart rate was normal and rhythm regular [None] : no edema [Bowel Sounds] : normal bowel sounds [No Masses] : no abdominal mass palpated [Abdomen Soft] : soft [] : no hepatosplenomegaly [No Hernia] : no hernia [Normal Sphincter Tone] : normal sphincter tone [No External Hemorrhoid] : no external hemorrhoids [Occult Blood] : negative occult blood [FIT Test] : negative FIT test [Inguinal Lymph Nodes Enlarged Bilaterally] : no inguinal lymphadenopathy [Normal Color / Pigmentation] : normal skin color and pigmentation [Normal] : oriented to person, place, and time [de-identified] : morbidly obese; intermittent hoarseness [de-identified] : no thrush [de-identified] : thyroid surgery [de-identified] : surgical scars

## 2024-06-11 NOTE — ASSESSMENT
[FreeTextEntry1] : 1.  Chronic GERD with probable LPRD, dysphagia, odynophagia--rule out erosive and/or eosinophilic esophagitis.  At increased risk for Candidal esophagitis.  Rule out neoplasm. 2.  Negative Cologuard March 2019, with negative stool guaiac and negative FIT on today's exam. 3.  Recurrent pancreatitis, known pancreas divisum; s/p cholecystectomy.  4.  Morbid obesity.  5.  PMR, maintained on low-dose steroids. 6.  Hypertension. 7.  Hyperlipidemia. 8.  Status post thyroid surgery. 9.  Prediabetes. 10.  Status post bilateral knee replacements, myomectomy, left Achilles surgery. 11.  Allergic to penicillin, morphine.  Plan: 1.  Extensive medical records reviewed. 2.  ASGE brochure on GERD given and discussed. 3.  Dietary and lifestyle modifications reviewed. 4.  Can move second daily dose of pantoprazole to before dinner.  May need to add famotidine at bedtime. 5.  Schedule EGD--given co-morbidities, including BMI>40, she is not a candidate for office anesthesia.  As I do no work in the hospitals, Dr. Boy Negrete could perform at [facility] on my behalf. Procedure, rationale, alternatives, material risks, and anesthesia plan were reviewed and brochure given. 6.  At some point, consider repeat Cologuard or even screening colonoscopy.   **Note that she brought us a fruit bowl and black & white cookies from [employer] today.**

## 2024-06-11 NOTE — HISTORY OF PRESENT ILLNESS
[FreeTextEntry1] : Rach presents for consideration of EGD.  She often has sensation of gasping for air in the middle of the night, with shortness of breath, cough and presumed reflux.  She has definitely had far more heartburn since undergoing cholecystectomy years ago (recurrent pancreatitis).  She reports dysphagia, with solids sticking by the sternal notch.  She reports odynophagia and "sandpaper sensation in the throat."  She has frequent hoarseness.  She was evaluated by ENT (Dr. Yu), felt to have component of reflux.  She has been taking pantoprazole 40 mg before breakfast, just increased dose to twice daily (second dose at 7 PM, about 1 hour after dinner) two weeks ago, with incomplete improvement.  She has been maintained on prednisone  2 mg twice daily for quite some time because of PMR, to contemplate changing to alternate immunosuppressive.  She saw Dr. Shah, testing for sleep apnea pending.  Cologuard was negative in March 2019; she has never undergone colonoscopy.  At time of thyroid surgery years ago, she was advised of "narrow airway."

## 2024-06-13 ENCOUNTER — APPOINTMENT (OUTPATIENT)
Dept: RHEUMATOLOGY | Facility: CLINIC | Age: 64
End: 2024-06-13
Payer: MEDICARE

## 2024-06-13 VITALS
SYSTOLIC BLOOD PRESSURE: 132 MMHG | TEMPERATURE: 98.1 F | RESPIRATION RATE: 16 BRPM | OXYGEN SATURATION: 96 % | DIASTOLIC BLOOD PRESSURE: 85 MMHG | HEIGHT: 65 IN | WEIGHT: 248 LBS | BODY MASS INDEX: 41.32 KG/M2 | HEART RATE: 106 BPM

## 2024-06-13 DIAGNOSIS — M35.3 POLYMYALGIA RHEUMATICA: ICD-10-CM

## 2024-06-13 LAB
ALBUMIN SERPL ELPH-MCNC: 4.7 G/DL
ALP BLD-CCNC: 100 U/L
ALT SERPL-CCNC: 30 U/L
ANION GAP SERPL CALC-SCNC: 15 MMOL/L
AST SERPL-CCNC: 18 U/L
BASOPHILS # BLD AUTO: 0.01 K/UL
BASOPHILS NFR BLD AUTO: 0.1 %
BILIRUB SERPL-MCNC: 0.3 MG/DL
BUN SERPL-MCNC: 25 MG/DL
CALCIUM SERPL-MCNC: 10.1 MG/DL
CHLORIDE SERPL-SCNC: 101 MMOL/L
CO2 SERPL-SCNC: 26 MMOL/L
CREAT SERPL-MCNC: 0.8 MG/DL
CRP SERPL-MCNC: 4 MG/L
EGFR: 82 ML/MIN/1.73M2
EOSINOPHIL # BLD AUTO: 0.08 K/UL
EOSINOPHIL NFR BLD AUTO: 1 %
HCT VFR BLD CALC: 42.8 %
HGB BLD-MCNC: 14.2 G/DL
IMM GRANULOCYTES NFR BLD AUTO: 0.3 %
LYMPHOCYTES # BLD AUTO: 0.98 K/UL
LYMPHOCYTES NFR BLD AUTO: 12.4 %
MAN DIFF?: NORMAL
MCHC RBC-ENTMCNC: 27.9 PG
MCHC RBC-ENTMCNC: 33.2 GM/DL
MCV RBC AUTO: 84.1 FL
MONOCYTES # BLD AUTO: 0.59 K/UL
MONOCYTES NFR BLD AUTO: 7.5 %
NEUTROPHILS # BLD AUTO: 6.21 K/UL
NEUTROPHILS NFR BLD AUTO: 78.7 %
PLATELET # BLD AUTO: 306 K/UL
POTASSIUM SERPL-SCNC: 4 MMOL/L
PROT SERPL-MCNC: 7.2 G/DL
RBC # BLD: 5.09 M/UL
RBC # FLD: 14.5 %
SODIUM SERPL-SCNC: 142 MMOL/L
WBC # FLD AUTO: 7.89 K/UL

## 2024-06-13 PROCEDURE — 99213 OFFICE O/P EST LOW 20 MIN: CPT

## 2024-06-13 NOTE — ASSESSMENT
[FreeTextEntry1] : check labs  continue with prednisone at 4 mg daily  hold off on adding new med, methotrexate, until after etiology of laryngospam and hypoxia is better understood f/u after work up is complete.   My collective time spent on today's visit was greater than 20 minutes and included: Preparation for the visit, review of the medical records, review of pertinent diagnostic studies, examination and counseling of the patient on the above diagnosis, treatment plan and prognosis, orders of diagnostic test, medications and or appropriate procedures and documentation in the medical record of today's visit.

## 2024-06-13 NOTE — HISTORY OF PRESENT ILLNESS
[FreeTextEntry1] : Patient present for f/u visit for PMR (on chronic steroids, taking prednisone 4 mg daily now, unable to taper). Last visit discussed MTX but has not started.  Recently, patient has been waking up with dyspnea several times at night.  Patient was seen by PCP, pulm, ENT and GI.  She was found to be hypoxic during sleep and was started on supplemental oxygen, the frequency of episodes decreased somewhat; awaiting endoscopy by GI  ? laryngospasm due to acid reflux no headaches, no vision change, no jaw claudication

## 2024-06-14 ENCOUNTER — APPOINTMENT (OUTPATIENT)
Dept: PULMONOLOGY | Facility: CLINIC | Age: 64
End: 2024-06-14
Payer: MEDICARE

## 2024-06-14 VITALS — OXYGEN SATURATION: 95 % | HEART RATE: 109 BPM | DIASTOLIC BLOOD PRESSURE: 81 MMHG | SYSTOLIC BLOOD PRESSURE: 124 MMHG

## 2024-06-14 DIAGNOSIS — K21.9 GASTRO-ESOPHAGEAL REFLUX DISEASE W/OUT ESOPHAGITIS: ICD-10-CM

## 2024-06-14 DIAGNOSIS — G47.33 OBSTRUCTIVE SLEEP APNEA (ADULT) (PEDIATRIC): ICD-10-CM

## 2024-06-14 DIAGNOSIS — E66.01 MORBID (SEVERE) OBESITY DUE TO EXCESS CALORIES: ICD-10-CM

## 2024-06-14 DIAGNOSIS — G47.34 IDIOPATHIC SLEEP RELATED NONOBSTRUCTIVE ALVEOLAR HYPOVENTILATION: ICD-10-CM

## 2024-06-14 LAB — ERYTHROCYTE [SEDIMENTATION RATE] IN BLOOD BY WESTERGREN METHOD: 11 MM/HR

## 2024-06-14 PROCEDURE — 99214 OFFICE O/P EST MOD 30 MIN: CPT

## 2024-06-14 PROCEDURE — G2211 COMPLEX E/M VISIT ADD ON: CPT

## 2024-06-14 NOTE — HISTORY OF PRESENT ILLNESS
[Never] : never [Obstructive Sleep Apnea] : obstructive sleep apnea [TextBox_4] : 63-year-old patient Pulmonary evaluation Sleep disordered breathing Follow-up sleep study Data demonstrates very severe obstructive sleep apnea AHI 66  Completed overnight oximetry data review below does demonstrate nocturnal hypoxemia Cough Dyspnea No prior pulmonary history reported Does not report a history of asthma pneumonia COPD bronchitis emphysema obstructive sleep apnea History noted the myalgia rheumatica possible acid reflux disease chronic sinus disease hyperlipidemia hypertension hypothyroidism Patient states the onset within the last week including this past weekend of awakening feeling like she is gasping for breath choking episodes She feels like there is sandpaper in her throat There is an associated cough She feels like there is episodes where her heart rate is increased She states she has some associated wheeze feels like her chest is making noise This is most often in bed supine She is taking medications including PPI Tessalon Perles She is also used Mucinex and Robitussin She states in the past she was told with having a surgical procedure as per anesthesia that she has a thin airway She sleeps alone and is unaware if she has loud snoring No purulent sputum No hemoptysis No recent fevers chills or sweats Home rapid COVID antigen negative No history of tobacco use No history of chemical toxic inhalational exposures No recent travel   [TextBox_100] : 05/2 4 [TextBox_108] : 66 [TextBox_112] : 9.5 [TextBox_116] : 68

## 2024-06-14 NOTE — PROCEDURE
[FreeTextEntry1] : Sleep study Date of study May 29 May 30, 2024 Severe obstructive sleep apnea Overall AHI 66 Overall RDI 73 Percent time less than 90% on room air during study 9.5%   Chest x-ray PA lateral May 23, 2024 Heart size is normal Lung fields are grossly clear no parenchymal infiltrates pleural effusions dominant pulmonary nodules Soft tissue bony structures grossly normal Livier mediastinum unremarkable Overall impression clear lungs  PFT May 23, 2024 Spirometric flow rates are normal range There is some variability of the flow-volume loop Lung volumes Normal total lung capacity Decreased ERV secondary to truncal obesity Diffusion normal range 88% predicted Hemoglobin 14.6

## 2024-06-14 NOTE — DISCUSSION/SUMMARY
[FreeTextEntry1] : Nocturnal hypoxemia Sleep disordered breathing Etiology above secondary to very severe obstructive sleep apnea AHI 66 with nocturnal hypoxemia Very severe KIMBERLEY AHI 66  please  ASAP  set withy vendor set up AUTO CPAP 6-18 cm h20 favor Full face mask  heated tubing  Nocturnal wheeze reported There are signs and symptoms of obstructive sleep apnea but but the symptoms are out of proportion I am concerned there is some anxiety component Rule out nocturnal GERD when supine but noted patient is on PPI Other history is reviewed Recommendations Overnight oximetry completed Will now order formal sleep study patient request home  CT chest inspiratory and expiratory rule out tracheomalacia results pending Will get a Medrol Dosepak for short-term for the nocturnal wheeze no clear significant improvement as per patient I reviewed in detail with the patient unremarkable chest x-ray with normal pulmonary physiology normal nitric oxide test Reassurance Discussed that if we confirm a high clinical suspicion for obstructive sleep apnea with a set of a CPAP device On CPAP with then repeat overnight oximetry to see if nocturnal oxygen is still needed For the short-term we will set up nocturnal oxygen therapy protocol 2 L with sleep GI for Upper endosopy

## 2024-06-14 NOTE — CONSULT LETTER
[Dear  ___] : Dear  [unfilled], [Consult Letter:] : I had the pleasure of evaluating your patient, [unfilled]. [Please see my note below.] : Please see my note below. [Consult Closing:] : Thank you very much for allowing me to participate in the care of this patient.  If you have any questions, please do not hesitate to contact me. [Sincerely,] : Sincerely, [FreeTextEntry3] : Jin Shah D.O., ADILENE.  of Medicine [facility] [place] Counseling: I discussed with the patient the risks of ixekizumab including but not limited to immunosuppression, serious infections, worsening of inflammatory bowel disease and drug reactions.  The patient understands that monitoring is required including a PPD at baseline and must alert us or the primary physician if symptoms of infection or other concerning signs are noted.

## 2024-06-14 NOTE — REASON FOR VISIT
[Follow-Up] : a follow-up visit [TextBox_44] : SOB, Cough ,Sleep disorder/severe KIMBERLEY AHI . ,  Nocturnal hypoxemia

## 2024-06-17 ENCOUNTER — APPOINTMENT (OUTPATIENT)
Dept: GASTROENTEROLOGY | Facility: HOSPITAL | Age: 64
End: 2024-06-17

## 2024-06-17 ENCOUNTER — OUTPATIENT (OUTPATIENT)
Dept: OUTPATIENT SERVICES | Facility: HOSPITAL | Age: 64
LOS: 1 days | Discharge: HOME | End: 2024-06-17
Payer: MEDICARE

## 2024-06-17 ENCOUNTER — RESULT REVIEW (OUTPATIENT)
Age: 64
End: 2024-06-17

## 2024-06-17 VITALS
HEIGHT: 65 IN | WEIGHT: 246.92 LBS | SYSTOLIC BLOOD PRESSURE: 125 MMHG | HEART RATE: 95 BPM | TEMPERATURE: 98 F | DIASTOLIC BLOOD PRESSURE: 74 MMHG | OXYGEN SATURATION: 98 % | RESPIRATION RATE: 18 BRPM

## 2024-06-17 VITALS
RESPIRATION RATE: 18 BRPM | HEART RATE: 91 BPM | DIASTOLIC BLOOD PRESSURE: 64 MMHG | SYSTOLIC BLOOD PRESSURE: 116 MMHG | OXYGEN SATURATION: 98 %

## 2024-06-17 DIAGNOSIS — Z98.890 OTHER SPECIFIED POSTPROCEDURAL STATES: Chronic | ICD-10-CM

## 2024-06-17 DIAGNOSIS — S89.91XA UNSPECIFIED INJURY OF RIGHT LOWER LEG, INITIAL ENCOUNTER: Chronic | ICD-10-CM

## 2024-06-17 DIAGNOSIS — K21.9 GASTRO-ESOPHAGEAL REFLUX DISEASE WITHOUT ESOPHAGITIS: ICD-10-CM

## 2024-06-17 DIAGNOSIS — Z96.651 PRESENCE OF RIGHT ARTIFICIAL KNEE JOINT: Chronic | ICD-10-CM

## 2024-06-17 PROCEDURE — 88305 TISSUE EXAM BY PATHOLOGIST: CPT | Mod: 26

## 2024-06-17 PROCEDURE — 43239 EGD BIOPSY SINGLE/MULTIPLE: CPT

## 2024-06-17 RX ORDER — DEXTROSE MONOHYDRATE AND SODIUM CHLORIDE 5; .3 G/100ML; G/100ML
500 INJECTION, SOLUTION INTRAVENOUS
Refills: 0 | Status: DISCONTINUED | OUTPATIENT
Start: 2024-06-17 | End: 2024-07-01

## 2024-06-17 RX ORDER — AMLODIPINE BESYLATE 2.5 MG/1
1 TABLET ORAL
Qty: 0 | Refills: 0 | DISCHARGE

## 2024-06-17 RX ORDER — HYDROCHLOROTHIAZIDE 25 MG
1 TABLET ORAL
Refills: 0 | DISCHARGE

## 2024-06-17 RX ORDER — AMLODIPINE BESYLATE 2.5 MG/1
1 TABLET ORAL
Refills: 0 | DISCHARGE

## 2024-06-17 RX ORDER — SIMVASTATIN 20 MG/1
1 TABLET, FILM COATED ORAL
Qty: 0 | Refills: 0 | DISCHARGE

## 2024-06-17 RX ORDER — LEVOTHYROXINE SODIUM 25 MCG
1 TABLET ORAL
Refills: 0 | DISCHARGE

## 2024-06-17 RX ORDER — LEVOTHYROXINE SODIUM 125 MCG
1 TABLET ORAL
Qty: 0 | Refills: 0 | DISCHARGE

## 2024-06-18 ENCOUNTER — RX RENEWAL (OUTPATIENT)
Age: 64
End: 2024-06-18

## 2024-06-18 ENCOUNTER — APPOINTMENT (OUTPATIENT)
Dept: ORTHOPEDIC SURGERY | Facility: CLINIC | Age: 64
End: 2024-06-18

## 2024-06-18 RX ORDER — POTASSIUM CHLORIDE 600 MG/1
8 CAPSULE, EXTENDED RELEASE ORAL
Qty: 90 | Refills: 0 | Status: ACTIVE | COMMUNITY
Start: 2022-11-22 | End: 1900-01-01

## 2024-06-19 LAB — SURGICAL PATHOLOGY STUDY: SIGNIFICANT CHANGE UP

## 2024-06-26 ENCOUNTER — NON-APPOINTMENT (OUTPATIENT)
Age: 64
End: 2024-06-26

## 2024-06-27 ENCOUNTER — RX RENEWAL (OUTPATIENT)
Age: 64
End: 2024-06-27

## 2024-07-02 ENCOUNTER — APPOINTMENT (OUTPATIENT)
Dept: OPHTHALMOLOGY | Facility: CLINIC | Age: 64
End: 2024-07-02

## 2024-07-02 ENCOUNTER — APPOINTMENT (OUTPATIENT)
Dept: OPHTHALMOLOGY | Facility: CLINIC | Age: 64
End: 2024-07-02
Payer: MEDICARE

## 2024-07-02 ENCOUNTER — NON-APPOINTMENT (OUTPATIENT)
Age: 64
End: 2024-07-02

## 2024-07-02 PROCEDURE — 92012 INTRM OPH EXAM EST PATIENT: CPT

## 2024-07-03 ENCOUNTER — APPOINTMENT (OUTPATIENT)
Dept: PULMONOLOGY | Facility: CLINIC | Age: 64
End: 2024-07-03
Payer: MEDICARE

## 2024-07-03 VITALS
DIASTOLIC BLOOD PRESSURE: 79 MMHG | HEIGHT: 65 IN | OXYGEN SATURATION: 96 % | HEART RATE: 102 BPM | RESPIRATION RATE: 16 BRPM | BODY MASS INDEX: 40.65 KG/M2 | WEIGHT: 244 LBS | SYSTOLIC BLOOD PRESSURE: 122 MMHG

## 2024-07-03 DIAGNOSIS — G47.33 OBSTRUCTIVE SLEEP APNEA (ADULT) (PEDIATRIC): ICD-10-CM

## 2024-07-03 PROCEDURE — 94660 CPAP INITIATION&MGMT: CPT

## 2024-07-26 ENCOUNTER — APPOINTMENT (OUTPATIENT)
Dept: PULMONOLOGY | Facility: CLINIC | Age: 64
End: 2024-07-26
Payer: MEDICARE

## 2024-07-26 VITALS — OXYGEN SATURATION: 92 % | DIASTOLIC BLOOD PRESSURE: 83 MMHG | SYSTOLIC BLOOD PRESSURE: 132 MMHG | HEART RATE: 92 BPM

## 2024-07-26 DIAGNOSIS — G47.34 IDIOPATHIC SLEEP RELATED NONOBSTRUCTIVE ALVEOLAR HYPOVENTILATION: ICD-10-CM

## 2024-07-26 DIAGNOSIS — G47.33 OBSTRUCTIVE SLEEP APNEA (ADULT) (PEDIATRIC): ICD-10-CM

## 2024-07-26 DIAGNOSIS — E66.01 MORBID (SEVERE) OBESITY DUE TO EXCESS CALORIES: ICD-10-CM

## 2024-07-26 PROCEDURE — 99214 OFFICE O/P EST MOD 30 MIN: CPT

## 2024-07-26 NOTE — CONSULT LETTER
[Dear  ___] : Dear  [unfilled], [Consult Letter:] : I had the pleasure of evaluating your patient, [unfilled]. [Please see my note below.] : Please see my note below. [Consult Closing:] : Thank you very much for allowing me to participate in the care of this patient.  If you have any questions, please do not hesitate to contact me. [Sincerely,] : Sincerely, [FreeTextEntry3] : Jin Shah D.O., ADILENE.  of Medicine [facility]

## 2024-07-26 NOTE — REASON FOR VISIT
[Sleep Apnea] : sleep apnea [Follow-Up] : a follow-up visit [Initial] : an initial visit [TextBox_44] : SOB, Cough ,Sleep disorder. ,  Nocturnal hypoxemia [FreeTextEntry1] : cpap i & m

## 2024-07-26 NOTE — PROCEDURE
[FreeTextEntry1] : CPAP data compliance through July 24, 2024 Usage 79% Hours greater than 6 AutoSet CPAP 6-18 cm H2O AHI 2.1 Positive goal compliance Noted study AHI 66 demonstrates normalization  CT chest May 24, 2024 clear lungs Mediastinum negative adenopathy Cardiac atherosclerotic calcification of the aorta and coronary artery Clear lung fields No abnormal tracheobronchial narrowing No evidence of dynamic expiration Follow-up cardiology   Chest x-ray PA lateral May 23, 2024 Heart size is normal Lung fields are grossly clear no parenchymal infiltrates pleural effusions dominant pulmonary nodules Soft tissue bony structures grossly normal Livier mediastinum unremarkable Overall impression clear lungs  PFT May 23, 2024 Spirometric flow rates are normal range There is some variability of the flow-volume loop Lung volumes Normal total lung capacity Decreased ERV secondary to truncal obesity Diffusion normal range 88% predicted Hemoglobin 14.6

## 2024-07-26 NOTE — HISTORY OF PRESENT ILLNESS
[Never] : never [Obstructive Sleep Apnea] : obstructive sleep apnea [APAP:] : APAP [TextBox_4] : 63-year-old patient RESMED CPAP  setting AUTO SET 6-18 cm h20 sleep sxs better   nasal  pillow  for nocturnal choking Dr Whitfield consult  pending O2 into CPAP  Pulmonary evaluation Sleep disordered breathing Completed overnight oximetry data review below does demonstrate nocturnal hypoxemia Cough Dyspnea No prior pulmonary history reported Does not report a history of asthma pneumonia COPD bronchitis emphysema obstructive sleep apnea History noted the myalgia rheumatica possible acid reflux disease chronic sinus disease hyperlipidemia hypertension hypothyroidism Patient states the onset within the last week including this past weekend of awakening feeling like she is gasping for breath choking episodes She feels like there is sandpaper in her throat There is an associated cough She feels like there is episodes where her heart rate is increased She states she has some associated wheeze feels like her chest is making noise This is most often in bed supine She is taking medications including PPI Tessalon Perles She is also used Mucinex and Robitussin She states in the past she was told with having a surgical procedure as per anesthesia that she has a thin airway She sleeps alone and is unaware if she has loud snoring No purulent sputum No hemoptysis No recent fevers chills or sweats Home rapid COVID antigen negative No history of tobacco use No history of chemical toxic inhalational exposures No recent travel   [TextBox_100] : 05/24 [TextBox_108] : 64 [TextBox_112] : 9.5 [TextBox_116] : 66 [TextBox_125] : 6-18 cm H20 [TextBox_160] : Nasal pillow

## 2024-07-26 NOTE — DISCUSSION/SUMMARY
[FreeTextEntry1] : Nocturnal hypoxemia Sleep disordered breathing Severe KIMBERLEY with AHI 66 with O 2 2 l/min Nocturnal wheeze reported Rule out nocturnal GERD when supine but noted patient is on PPI states EGD verbal neg Other history is reviewed Recommendations Overnight oximetry completed Now rep[at with CPAP and hold O2 x 1  night to see if no significant RA desat on CPAP Patient compliant with CPAP therapy.  Continue present settings.  Patient with demonstrated clinical benefit with daytime and nocturnal symptomatology improvement. CT chest inspiratory and expiratory rule out tracheomalacia results pending Will get a Medrol Dosepak for short-term for the nocturnal wheeze no clear significant improvement as per patient I reviewed in detail with the patient unremarkable chest x-ray with normal pulmonary physiology normal nitric oxide test Reassurance Discussed that if we confirm a high clinical suspicion for obstructive sleep apnea with a set of a CPAP device On CPAP with then repeat overnight oximetry to see if nocturnal oxygen is still needed For the short-term we will set up nocturnal oxygen therapy protocol 2 L with sleep

## 2024-08-07 ENCOUNTER — APPOINTMENT (OUTPATIENT)
Dept: OTOLARYNGOLOGY | Facility: CLINIC | Age: 64
End: 2024-08-07

## 2024-08-07 ENCOUNTER — NON-APPOINTMENT (OUTPATIENT)
Age: 64
End: 2024-08-07

## 2024-08-07 PROBLEM — R49.0 HOARSENESS: Status: ACTIVE | Noted: 2024-08-07

## 2024-08-07 PROCEDURE — 99204 OFFICE O/P NEW MOD 45 MIN: CPT | Mod: 25

## 2024-08-07 PROCEDURE — 31575 DIAGNOSTIC LARYNGOSCOPY: CPT

## 2024-08-07 NOTE — HISTORY OF PRESENT ILLNESS
[de-identified] : Ms. HENDERSON is a 63 year female referred by Dr. Payotn Wilkes for raspy voice for 3 months, pt states she loses her voice after speaking more than 10 minutes for 3 months.  The difficulty with voice is not described as being spastic but rather increasing raspiness.  She had not been sick prior to this starting.  She did not have any recent intubation prior to this..  Pt was seen by Dr. Yu, referral showed diagnosis of laryngeal spasm, she continues with episodes of choking and throat clearing which is associated with sensation she is unable to catch her breath. described as gasping.  They last 30 seconds to 1 minute.  They are extremely distressing.  There is no associated stridor.   she has been on Pantoprazole 40 mg for over a year recently started before dinner, taken correctly.  Over the past 3 months has been increased to twice daily dosing.  There has been no changes with her voice with these measures, and no improvement in the gasping episodes. pt was diagnosed with severe KIMBERLEY started with CPAP 3 months ago and nighttime breathing is much improved recent upper endoscopy with Dr. Goldberg reports as negative takes HCTZ, Amlodipine daily no new meds  She is not on any inhalers, she is on prednisone 3 mg daily for polymyalgia rheumatica.

## 2024-08-07 NOTE — END OF VISIT
[FreeTextEntry3] : I personally saw and examined DANY HENDERSON  in detail. I spoke to MONICA Friend regarding the assessment and plan of care. I performed the procedures and relevant physical exam. I have made changes to the body of the note wherever necessary and appropriate.

## 2024-08-07 NOTE — ASSESSMENT
[FreeTextEntry1] : Ms. HENDERSON is a 63 year female here for eval of hoarseness for 3 months. Scope shows evidence of reflux, no masses, no polyps seen today.   Her vocal folds appear completely clear on examination today with basic laryngoscopy, she is highly symptomatic and has very significant quality of life issues related to both her hoarseness and episodes of gasping.  She is in need of stroboscopy and evaluation by laryngology.  Her clinical history is not completely consistent with laryngospasm.  These gasping episodes do seem to occur with more so a reactive larynx rather than episodes of stress or anxiety-for example they are significantly triggered by eating pretzels.  So they may actually improve with a steroid inhaler.  However given her degree of hoarseness and vocal fatigue, she may have some underlying presbylarynx or glottic gap which will be evaluated with stroboscopy and so I am hesitant to start this until she is seen laryngology as a steroid inhaler will make this worse.  - will refer to Laryngology Dr. Davis and help facilitate an appt - will refer to Speech therapy in the interim to help with laryngospasm - c/w PPI twice daily

## 2024-08-07 NOTE — PROCEDURE
[de-identified] : reason for laryngoscopy: unable to cooperate with mirror   Fiberoptic laryngoscopy was performed on the patient.  R/b/a was explained to the patient and they agreed to proceed with procedure.  Nasal cavities: clear b/l, Nasopharynx: mild erythema and swelling, Oropharynx/Hypopharynx: + lingual tonsillar hypertrophy no masses or lesions, posterior pharyngeal wall with cobblestoning.  No BOT hypertrophy, vallecula is clear of any masses or cysts, Supraglottis: epiglottis sharp with normal bilateral arytenoids, aryepiglottic folds, ventricles but with + interarytenoid edema consistent with reflux, Glottis: clear, TVCs mobile b/l.  Subglottis clear  No pooling of secretions in the pyriform sinus.  Airway patent  Scope #: 39

## 2024-08-07 NOTE — CONSULT LETTER
[Dear  ___] : Dear  [unfilled], [Consult Letter:] : I had the pleasure of evaluating your patient, [unfilled]. [Consult Closing:] : Thank you very much for allowing me to participate in the care of this patient.  If you have any questions, please do not hesitate to contact me. [Please see my note below.] : Please see my note below. [FreeTextEntry3] : Ni Ayala MD Otolaryngology- Facial Plastics  [address] [place] [id number] (P) - [phone] (F) - [phone]

## 2024-08-07 NOTE — PHYSICAL EXAM
[Normal] : lingual tonsils are normal [Midline] : trachea located in midline position [Laryngoscopy Performed] : laryngoscopy was performed, see procedure section for findings [de-identified] : Oropharyngeal crowding [de-identified] : Elongated palate

## 2024-08-09 ENCOUNTER — RX RENEWAL (OUTPATIENT)
Age: 64
End: 2024-08-09

## 2024-08-23 ENCOUNTER — APPOINTMENT (OUTPATIENT)
Dept: OTOLARYNGOLOGY | Facility: CLINIC | Age: 64
End: 2024-08-23
Payer: MEDICARE

## 2024-08-23 ENCOUNTER — NON-APPOINTMENT (OUTPATIENT)
Age: 64
End: 2024-08-23

## 2024-08-23 VITALS
BODY MASS INDEX: 40.32 KG/M2 | HEART RATE: 85 BPM | OXYGEN SATURATION: 99 % | SYSTOLIC BLOOD PRESSURE: 129 MMHG | DIASTOLIC BLOOD PRESSURE: 68 MMHG | WEIGHT: 242 LBS | RESPIRATION RATE: 17 BRPM | HEIGHT: 65 IN

## 2024-08-23 DIAGNOSIS — R13.10 DYSPHAGIA, UNSPECIFIED: ICD-10-CM

## 2024-08-23 DIAGNOSIS — J38.7 OTHER DISEASES OF LARYNX: ICD-10-CM

## 2024-08-23 PROCEDURE — 99213 OFFICE O/P EST LOW 20 MIN: CPT | Mod: 25

## 2024-08-23 PROCEDURE — 31575 DIAGNOSTIC LARYNGOSCOPY: CPT

## 2024-08-23 NOTE — ASSESSMENT
[FreeTextEntry1] : Assessment/Plan:  #1 Inducible laryngeal obstruction  #2 Dysphagia #3 KIMBERLEY  After a thorough discussion of our findings today, the patient understands we need to do further workup to determine the potential cause of their dysphagia.  As such I have ordered them for a Modified Barium Swallow (MBS) to be performed by our SLP team.  I would like to follow up with them in clinic following this swallow study to go over the results and next steps.  The patient expressed understanding and agreement with this plan. I will reach out if anything concerning seen.   I have recommended patient start respiratory retraining in [place].  
[FreeTextEntry1] : Assessment/Plan:  #1 Inducible laryngeal obstruction  #2 Dysphagia #3 KIMBERLEY  After a thorough discussion of our findings today, the patient understands we need to do further workup to determine the potential cause of their dysphagia.  As such I have ordered them for a Modified Barium Swallow (MBS) to be performed by our SLP team.  I would like to follow up with them in clinic following this swallow study to go over the results and next steps.  The patient expressed understanding and agreement with this plan. I will reach out if anything concerning seen.   I have recommended patient start respiratory retraining in [place].  
No significant past surgical history

## 2024-08-23 NOTE — HISTORY OF PRESENT ILLNESS
[de-identified] : DANY HENDERSON is a 63 year old woman who presents to the [facility] with dysphagia and PVFM. She is referred by Dr. Yajaira Ayala who last saw the patient on 8/7/24. Reports gasping episodes has been going on for 5 months. Only provoked by eating, talking, or sneezing.  Dysphagia for several years only to solids.   States she started with gasping and choking on air and hard foods.  She went to Dr. Shah (pulm) and was newly dx with KIMBERLEY and now on CPAP which is helping with gasping and choking episodes at night.  During the day she continues to have intermittent gasping and choking.  She notes periods of normal voicing. She notes specific difficulties with swallowing with harder foods, tolerating thin liquids. Has not changed diet, continues to eat solid foods. No prior MBS.  She notes frequent classic heartburn symptoms, taking pantoprazole 2x a day Smoking history: None  They note a cough for the past 5 months.  Specific triggers for the cough include: sneezing, speaking, and at random times.  They note severe paroxysms of cough.  They note coughing as they lie down for bed.  But she is now elevating HOB with 3 pillows.  They note being awakened from sleep by this cough frequently.   They have tried the following in an attempt to treat the cough: None  They are not maintained on an ACE inhibitor.  VOCAL DEMANDS: Her vocal demands are primarily those of conversational, retired .   PREVIOUS STUDIES:  GI Endoscopy 06/17/24  Sleep study 07/29/24   CT Chest 05/24/24: The lungs are clear. No abnormal tracheobronchial narrowing with dynamic expiration

## 2024-08-23 NOTE — HISTORY OF PRESENT ILLNESS
[de-identified] : DANY HENDERSON is a 63 year old woman who presents to the [facility] with dysphagia and PVFM. She is referred by Dr. Yajaira Ayala who last saw the patient on 8/7/24. Reports gasping episodes has been going on for 5 months. Only provoked by eating, talking, or sneezing.  Dysphagia for several years only to solids.   States she started with gasping and choking on air and hard foods.  She went to Dr. Shah (pulm) and was newly dx with KIMBERLEY and now on CPAP which is helping with gasping and choking episodes at night.  During the day she continues to have intermittent gasping and choking.  She notes periods of normal voicing. She notes specific difficulties with swallowing with harder foods, tolerating thin liquids. Has not changed diet, continues to eat solid foods. No prior MBS.  She notes frequent classic heartburn symptoms, taking pantoprazole 2x a day Smoking history: None  They note a cough for the past 5 months.  Specific triggers for the cough include: sneezing, speaking, and at random times.  They note severe paroxysms of cough.  They note coughing as they lie down for bed.  But she is now elevating HOB with 3 pillows.  They note being awakened from sleep by this cough frequently.   They have tried the following in an attempt to treat the cough: None  They are not maintained on an ACE inhibitor.  VOCAL DEMANDS: Her vocal demands are primarily those of conversational, retired .   PREVIOUS STUDIES:  GI Endoscopy 06/17/24  Sleep study 07/29/24   CT Chest 05/24/24: The lungs are clear. No abnormal tracheobronchial narrowing with dynamic expiration

## 2024-08-23 NOTE — PROCEDURE
[de-identified] : Procedure: Transnasal flexible laryngoscopy  Description: Informed consent was obtained from the patient prior to the procedure. The patient was seated in the clinic chair. Topical anesthesia was achieved by first spraying the nasal cavities with 4% lidocaine and 1% phenylephrine.   Exam: This demonstrates a clear vallecula and crisp epiglottis. The aryepiglottic folds are intact and symmetric bilaterally. The hypopharynx does not demonstrate pooling. The interarytenoid space demonstrates no lesions. It does not demonstrate pachydermia. The false vocal folds are symmetric and without lesions or masses. False fold voicing (plica ventricularis) is not noted today. The true vocal folds show normal and symmetric motion bilaterally. There is no paradoxical motion. The right medial edge is crisp and shows no lesions or masses. The left medial edge is crisp and shows no lesions or masses. The mucosal covering is minimally edematous. There is not erythema present today. There are no obvious vascular ectasias present. The vocal processes do not demonstrate granulomas. The subglottis and proximal trachea is clear and unobstructed to the limits of the examination today.

## 2024-08-23 NOTE — CONSULT LETTER
[Dear  ___] : Dear  [unfilled], [Consult Letter:] : I had the pleasure of evaluating your patient, [unfilled]. [Please see my note below.] : Please see my note below. [Consult Closing:] : Thank you very much for allowing me to participate in the care of this patient.  If you have any questions, please do not hesitate to contact me. [Sincerely,] : Sincerely, [FreeTextEntry2] :  Dr. Yajaira Ayala [FreeTextEntry3] : Wild Davis M.D. Division of Laryngology | Department of Otolaryngology  [address] [address]

## 2024-08-23 NOTE — PROCEDURE
[de-identified] : Procedure: Transnasal flexible laryngoscopy  Description: Informed consent was obtained from the patient prior to the procedure. The patient was seated in the clinic chair. Topical anesthesia was achieved by first spraying the nasal cavities with 4% lidocaine and 1% phenylephrine.   Exam: This demonstrates a clear vallecula and crisp epiglottis. The aryepiglottic folds are intact and symmetric bilaterally. The hypopharynx does not demonstrate pooling. The interarytenoid space demonstrates no lesions. It does not demonstrate pachydermia. The false vocal folds are symmetric and without lesions or masses. False fold voicing (plica ventricularis) is not noted today. The true vocal folds show normal and symmetric motion bilaterally. There is no paradoxical motion. The right medial edge is crisp and shows no lesions or masses. The left medial edge is crisp and shows no lesions or masses. The mucosal covering is minimally edematous. There is not erythema present today. There are no obvious vascular ectasias present. The vocal processes do not demonstrate granulomas. The subglottis and proximal trachea is clear and unobstructed to the limits of the examination today.

## 2024-08-23 NOTE — CONSULT LETTER
[Dear  ___] : Dear  [unfilled], [Consult Letter:] : I had the pleasure of evaluating your patient, [unfilled]. [Please see my note below.] : Please see my note below. [Consult Closing:] : Thank you very much for allowing me to participate in the care of this patient.  If you have any questions, please do not hesitate to contact me. [Sincerely,] : Sincerely, [FreeTextEntry2] :  Dr. Yajaira Ayala [FreeTextEntry3] : Wild Davis M.D. Division of Laryngology | Department of Otolaryngology  [address] [address]

## 2024-09-03 ENCOUNTER — RX RENEWAL (OUTPATIENT)
Age: 64
End: 2024-09-03

## 2024-09-16 ENCOUNTER — APPOINTMENT (OUTPATIENT)
Dept: PULMONOLOGY | Facility: CLINIC | Age: 64
End: 2024-09-16
Payer: MEDICARE

## 2024-09-16 VITALS — HEART RATE: 92 BPM | SYSTOLIC BLOOD PRESSURE: 124 MMHG | DIASTOLIC BLOOD PRESSURE: 74 MMHG | OXYGEN SATURATION: 97 %

## 2024-09-16 DIAGNOSIS — Z23 ENCOUNTER FOR IMMUNIZATION: ICD-10-CM

## 2024-09-16 DIAGNOSIS — K21.9 GASTRO-ESOPHAGEAL REFLUX DISEASE W/OUT ESOPHAGITIS: ICD-10-CM

## 2024-09-16 DIAGNOSIS — R05.9 COUGH, UNSPECIFIED: ICD-10-CM

## 2024-09-16 DIAGNOSIS — G47.33 OBSTRUCTIVE SLEEP APNEA (ADULT) (PEDIATRIC): ICD-10-CM

## 2024-09-16 PROCEDURE — 90677 PCV20 VACCINE IM: CPT

## 2024-09-16 PROCEDURE — G0009: CPT

## 2024-09-16 PROCEDURE — G2211 COMPLEX E/M VISIT ADD ON: CPT

## 2024-09-16 PROCEDURE — 99213 OFFICE O/P EST LOW 20 MIN: CPT

## 2024-09-16 NOTE — DISCUSSION/SUMMARY
[FreeTextEntry1] : Nocturnal hypoxemia Sleep disordered breathing Severe KIMBERLEY with AHI 66 with O 2 2 l/min Nocturnal wheeze reported Rule out nocturnal GERD when supine but noted patient is on PPI states EGD verbal neg Other history is reviewed Recommendations Overnight oximetry completed Now rep[at with CPAP and hold O2 x 1  night to see if no significant RA desat on CPAP Patient compliant with CPAP therapy.  Continue present settings.  Patient with demonstrated clinical benefit with daytime and nocturnal symptomatology improvement. CT chest inspiratory and expiratory rule out tracheomalacia results pending Will get a Medrol Dosepak for short-term for the nocturnal wheeze no clear significant improvement as per patient I reviewed in detail with the patient unremarkable chest x-ray with normal pulmonary physiology normal nitric oxide test Reassurance Discussed that if we confirm a high clinical suspicion for obstructive sleep apnea with a set of a CPAP device On CPAP with then repeat overnight oximetry to see if nocturnal oxygen is still needed For the short-term we will set up nocturnal oxygen therapy protocol 2 L with sleep  ongoing w/u with THANH MBS

## 2024-09-16 NOTE — CONSULT LETTER
[Dear  ___] : Dear  [unfilled], [Consult Letter:] : I had the pleasure of evaluating your patient, [unfilled]. [Please see my note below.] : Please see my note below. [Consult Closing:] : Thank you very much for allowing me to participate in the care of this patient.  If you have any questions, please do not hesitate to contact me. [Sincerely,] : Sincerely, [FreeTextEntry3] : Jin Shah D.O., ADILENE.  of Medicine [facility]

## 2024-09-16 NOTE — PROCEDURE
[FreeTextEntry1] : CPAP data compliance through 9/15/24 Usage 100 % Hours greater than 8 AutoSet CPAP 6-18 cm H2O AHI 1.3 Positive goal compliance Noted study AHI 66 demonstrates normalization  CT chest May 24, 2024 clear lungs Mediastinum negative adenopathy Cardiac atherosclerotic calcification of the aorta and coronary artery Clear lung fields No abnormal tracheobronchial narrowing No evidence of dynamic expiration Follow-up cardiology   Chest x-ray PA lateral May 23, 2024 Heart size is normal Lung fields are grossly clear no parenchymal infiltrates pleural effusions dominant pulmonary nodules Soft tissue bony structures grossly normal Livier mediastinum unremarkable Overall impression clear lungs  PFT May 23, 2024 Spirometric flow rates are normal range There is some variability of the flow-volume loop Lung volumes Normal total lung capacity Decreased ERV secondary to truncal obesity Diffusion normal range 88% predicted Hemoglobin 14.6  PREVNAIR 20 IM 9/16/24

## 2024-09-16 NOTE — HISTORY OF PRESENT ILLNESS
[Never] : never [Obstructive Sleep Apnea] : obstructive sleep apnea [APAP:] : APAP [TextBox_4] : 63-year-old patient  s/p ENT noted pending MBS RESMED CPAP  setting AUTO SET 6-18 cm h20 sleep sxs better   nasal  pillow  for nocturnal choking Dr Whitfield consult  pending O2 into CPAP  Pulmonary evaluation Sleep disordered breathing Completed overnight oximetry data review below does demonstrate nocturnal hypoxemia Cough Dyspnea No prior pulmonary history reported Does not report a history of asthma pneumonia COPD bronchitis emphysema obstructive sleep apnea History noted the myalgia rheumatica possible acid reflux disease chronic sinus disease hyperlipidemia hypertension hypothyroidism Patient states the onset within the last week including this past weekend of awakening feeling like she is gasping for breath choking episodes She feels like there is sandpaper in her throat There is an associated cough She feels like there is episodes where her heart rate is increased She states she has some associated wheeze feels like her chest is making noise This is most often in bed supine She is taking medications including PPI Tessalon Perles She is also used Mucinex and Robitussin She states in the past she was told with having a surgical procedure as per anesthesia that she has a thin airway She sleeps alone and is unaware if she has loud snoring No purulent sputum No hemoptysis No recent fevers chills or sweats Home rapid COVID antigen negative No history of tobacco use No history of chemical toxic inhalational exposures No recent travel   [TextBox_100] : 05/24 [TextBox_108] : 64 [TextBox_112] : 9.5 [TextBox_116] : 68 [TextBox_160] : Nasal pillow [TextBox_125] : 6-18 cm H20

## 2024-09-16 NOTE — REASON FOR VISIT
[Initial] : an initial visit [Sleep Apnea] : sleep apnea [Follow-Up] : a follow-up visit [TextBox_44] : SOB, Cough ,Sleep disorder. ,  Nocturnal hypoxemia [FreeTextEntry1] : cpap i & m

## 2024-09-25 ENCOUNTER — OUTPATIENT (OUTPATIENT)
Dept: OUTPATIENT SERVICES | Facility: HOSPITAL | Age: 64
LOS: 1 days | End: 2024-09-25
Payer: MEDICARE

## 2024-09-25 ENCOUNTER — NON-APPOINTMENT (OUTPATIENT)
Age: 64
End: 2024-09-25

## 2024-09-25 DIAGNOSIS — R13.10 DYSPHAGIA, UNSPECIFIED: ICD-10-CM

## 2024-09-25 DIAGNOSIS — Z98.890 OTHER SPECIFIED POSTPROCEDURAL STATES: Chronic | ICD-10-CM

## 2024-09-25 DIAGNOSIS — Z96.651 PRESENCE OF RIGHT ARTIFICIAL KNEE JOINT: Chronic | ICD-10-CM

## 2024-09-25 DIAGNOSIS — S89.91XA UNSPECIFIED INJURY OF RIGHT LOWER LEG, INITIAL ENCOUNTER: Chronic | ICD-10-CM

## 2024-09-25 PROCEDURE — 74230 X-RAY XM SWLNG FUNCJ C+: CPT | Mod: 26

## 2024-09-25 PROCEDURE — 74230 X-RAY XM SWLNG FUNCJ C+: CPT

## 2024-09-27 NOTE — REASON FOR VISIT
[Videofluroscopy] : Videofluroscopy [FreeTextEntry1] : MODIFIED BARIUM SWALLOW STUDY    Date of Report:  9/25/24   Date of Evaluation: 9/25/24   Patient Name: Rach Henderson   YOB: 1960   Date of Onset:  Ongoing   Primary Diagnosis: Dysphagia   Treatment Diagnosis: Dysphagia   Ordering provider: MD Wild Davis    IMPRESSIONS/RESULTS:   Oral & pharyngeal stages WFL, no penetration, no aspiration observed. Trace stasis in the valleculae with regular solid cleared after subsequent swallow and liquid trials.   RECOMMENDATIONS:   1) Regular with Thin liquids, as tolerated   2) Aspiration precautions, Reflux precautions   3) Oral care   4) Upright during and 1 hour after eating/drinking   5) Small bites/sips, alternate foods/liquids, utilize extra condiments to moisten dry foods, utilize puree (yogurt, applesauce, pudding with dry textured pills, as needed)   6) Follow up with referring ENT, as directed    7) Follow up with GI due to consistent report of globus sensation after swallowing   8) Follow up with Pulmonologist, as directed   9) Consider FEES for further assessment of laryngeal function during deglutition and to R/O LPR   10) Consider voice evaluation due to history of PVFM   11) Dysphagia therapy not warranted as swallow function WFL  HISTORY:     This 63 year old female was seen this morning for a Modified Barium Swallow Study to: _x_rule out aspiration; _x_assess for diet texture change as appropriate; and/or _x_explore positional strategies and/or compensatory techniques to eliminate penetration/aspiration.  The physician ordered this procedure because they want the patient to meet their nutrition/hydration needs by mouth without compromising respiratory status.   Patient arrived to [facility] Radiology suite independently and served as a reliable historian.  Patient reported a history of reflux and has been on Pantoprazole for 1 year.  She endorsed difficulty swallowing in which if she "chews on something big she gags", this has been going on for a year, however, patient has not been avoiding specific foods.  She endorsed difficulty swallowing pills and had to stop taking her multi vitamin.  Patient confirmed history of PVFM, reported shouting can trigger episodes, denied history of speech therapy.  Patient reported recent diagnosis of KIMBERLEY and use of CPAP, prior to this diagnosis, her baseline SpO2 was 89% and was on supplemental O2.  Patient reported improvement in choking episodes and SpO2 after diagnosis of KIMBERLEY and use of CPAP, and supplemental O2 was discontinued.  She reports current baseline SpO2 is 95% and has an elevated baseline HR of .  Patient confirmed recent Endoscopy in June which she reported as "normal, and has told she has a small airway, which could be scar tissue issue from thyroid removal.  The doctor said her throat spasmed when scope was put in".  Patient denied food allergies, recent pneumonia or unintentional weight loss.  She reported maintaining a regular diet with thin liquids.   Per ENT note 8/23/24: "RACH HENDERSON is a 63 year old woman who presents to the [facility] with dysphagia and PVFM.  She is referred by Dr. Yajaira Ayala who last saw the patient on 8/7/24.  Reports gasping episodes has been going on for 5 months. Only provoked by eating, talking, or sneezing.  Dysphagia for several years only to solids.  States she started with gasping and choking on air and hard foods. She went to Dr. Shah (pul) and was newly dx with KIMBERLEY and now on CPAP which is helping with gasping and choking episodes at night.  During the day she continues to have intermittent gasping and choking.  She notes periods of normal voicing.  She notes specific difficulties with swallowing with harder foods, tolerating thin liquids. Has not changed diet, continues to eat solid foods. No prior MBS.  She notes frequent classic heartburn symptoms, taking pantoprazole 2x a day  Smoking history: None  They note a cough for the past 5 months.  Specific triggers for the cough include: sneezing, speaking, and at random times.  They note severe paroxysms of cough.  They note coughing as they lie down for bed. But she is now elevating HOB with 3 pillows.  They note being awakened from sleep by this cough frequently.  They have tried the following in an attempt to treat the cough: None  They are not maintained on an ACE inhibitor.  VOCAL DEMANDS: Her vocal demands are primarily those of conversational, retired .  PREVIOUS STUDIES:  GI Endoscopy 06/17/24  Sleep study 07/29/24   CT Chest 05/24/24:  The lungs are clear. No abnormal tracheobronchial narrowing with dynamic expiration  Laryngoscopy: Procedure:   Exam: This demonstrates a clear vallecula and crisp epiglottis. The aryepiglottic folds are intact and symmetric bilaterally. The hypopharynx does not demonstrate pooling. The interarytenoid space demonstrates no lesions. It does not demonstrate pachydermia. The false vocal folds are symmetric and without lesions or masses. False fold voicing (plica ventricularis) is not noted today. The true vocal folds show normal and symmetric motion bilaterally. There is no paradoxical motion. The right medial edge is crisp and shows no lesions or masses. The left medial edge is crisp and shows no lesions or masses. The mucosal covering is minimally edematous. There is not erythema present today. There are no obvious vascular ectasias present. The vocal processes do not demonstrate granulomas. The subglottis and proximal trachea is clear and unobstructed to the limits of the examination today.    Per EMR (confirmed with patient):   "Active Problems   Abnormal EKG (794.31) (R94.31)   Abnormal liver enzymes (790.5) (R74.8)   Acid reflux disease (530.81) (K21.9)   Acute left-sided low back pain with left-sided sciatica (724.2,724.3) (M54.42)   Anatomical narrow angle glaucoma (365.02) (H40.039)   Anterior neck pain (723.1) (M54.2)   Anxiety (300.00) (F41.9)   Arthralgia (719.40) (M25.50)   Chronic sinusitis of both maxillary sinuses (473.0) (J32.0)   Community acquired pneumonia (486) (J18.9)   Cough (786.2) (R05.9)   Current chronic use of systemic steroids (V58.65) (Z79.52)   Difficulty sleeping (780.50) (G47.9)   Diverticulosis of large intestine without hemorrhage (562.10) (K57.30)   DNS (deviated nasal septum) (470) (J34.2)   Dysphagia, unspecified type (787.20) (R13.10)   Erythrocytosis (289.0) (D75.1)   ETD (Eustachian tube dysfunction), right (381.81) (H69.91)   Facial pain, atypical (350.2) (G50.1)   Fatigue (780.79) (R53.83)   Hammer toe of second toe of right foot (735.4) (M20.41)   History of hypertension (V12.59) (Z86.79)   Hoarseness (784.42) (R49.0)   Hypercalcemia (275.42) (E83.52)   Hypercholesterolemia (272.0) (E78.00)   Hyperproteinemia (273.8) (E88.09)   Hypertension (401.9) (I10)   Hypothyroidism (244.9) (E03.9)   Insomnia, unspecified type (780.52) (G47.00)   Interdigital neuroma of right foot (355.6) (G57.81)   Low vitamin D level (790.6) (R79.89)   LPRD (laryngopharyngeal reflux disease) (478.79) (K21.9)   Metatarsalgia of right foot (726.70) (M77.41)   Morbid obesity (278.01) (E66.01)   Muscle pain (729.1) (M79.10)   Myalgia (729.1) (M79.10)   Nasal congestion (478.19) (R09.81)   Nocturnal hypoxemia (327.24) (G47.34)   Nuclear senile cataract of both eyes (366.16) (H25.13)   Odynophagia (787.20) (R13.10)   KIMBERLEY (obstructive sleep apnea) (327.23) (G47.33)   Osteoarthritis of lumbar spine (721.3) (M47.816)   Other sinusitis (473.8) (J32.9)   Pancreas divisum (751.7) (Q45.3)   Pancreatitis (577.0) (K85.90)   Polymyalgia rheumatica (725) (M35.3)   Preop cardiovascular exam (V72.81) (Z01.810)   Preoperative clearance (V72.84) (Z01.818)   Primary osteoarthritis of left knee (715.16) (M17.12)   Scoliosis (737.30) (M41.9)   Screening for colorectal cancer (V76.51,V76.41) (Z12.11,Z12.12)   Screening for viral disease (V73.99) (Z11.59)   Sepsis (038.9,995.91) (A41.9)   Sleep disorder breathing (780.59) (G47.30)   Status post total left knee replacement (V43.65) (Z96.652)   Status post total right knee replacement (V43.65) (Z96.651)   Thyroid disorder (246.9) (E07.9)   Past Medical History   History of headache (V13.89) (Z87.898)   History of mammogram (V15.89) (Z92.89)   History of Knee pain (719.46) (M25.569)   History of Papilloma of right breast (217) (D24.1)     Surgical History   History of Arthroscopy Knee Right   History of Cholecystectomy Laparoscopic   History of Knee replacement   History of Near-Total Thyroidectomy   History of Primary Repair Of Ruptured Achilles Tendon   History of Uterine Myomectomy Abdominal Approach"       OBSERVATIONS:   Patient received in Radiology, stood in lateral view for study and AP view for Esophageal screen. Patient was A&A Ox4, on RA, SpO2 95-98%, -103 which is baseline per pt, she reported feeling nervous, education and support provided, patient was in NAD,  pain scale 0/10 pre & post MBS.     Oral peripheral examination revealed +facial symmetry, labial/lingual ROM/strength WFL, +adequate dentition, +velar movement   CONSISTENCIES ADMINISTERED:   Solids:  Puree, Regular solid   Liquids: Thin liquid   SUMMARY & IMPRESSION   Preliminary Videofluoroscopic Findings:   1) +Velopharyngeal movement noted   Videofluoroscopic Evaluation Reveals:    1) Oral stage WFL.    2) Pharyngeal stage WFL for puree, regular solid and thin liquids.  No penetration, no aspiration observed.  +Trace stasis in the valleculae with regular solid which cleared after subsequent swallow and liquid trials.  No other pharyngeal stasis observed.  Patient with consistent report of globus sensation, pointed to mid pharynx after all trials with slight improvement after liquid trials.   3) Suspect cricopharyngeal bar at approximately C5, however, not observed to impact swallow function.  4) An Esophageal screen was performed in AP view.  Patient accepted regular and thin liquids.  Bolus' coursed through the oropharynx and esophagus, without evidence of hold up.  Patient accepted a barium tablet however, was unable to elicit swallow due to report of dry texture of tablet, tablet expectorated by patient, unable to be assessed.  This cannot be considered a formal assessment of the esophagus.     Aspiration - Penetration Scale:    1-Puree, Regular solid, Thin liquids  Aspiration-Penetration Scale Key: (Chanda et al Dysphagia 11: 93-98 (April 1996), Aspiration-Penetration Scale)   1. Material does not enter the airway   2. Material enters the airway, remains above the vocal folds and is ejected from the airway   3. Material enters the airway, remains above the vocal folds and is not ejected from the airway   4. Material enters the airway, contacts the vocal folds and is ejected from the airway   5. Material enters the airway, contacts the vocal folds and is not ejected from the airway    6. Material enters the airway, passes below the vocal folds and is ejected from the airway (aspiration)   7. Material enters the airway, passes below the vocal folds and is not ejected from the airway despite effort (aspiration)   8. Material enters the airway, passes below the vocal folds and no effort is made to eject (silent aspiration)   The above results and recommendations have been reviewed with the patient who verbalized understanding.  Should you have any additional questions, please contact the [facility] at [phone].   Dr. Davis made aware of patient reported baseline HR.  Bertha Price M.A., CCC-SLP

## 2024-09-27 NOTE — REASON FOR VISIT
[Videofluroscopy] : Videofluroscopy [FreeTextEntry1] : MODIFIED BARIUM SWALLOW STUDY    Date of Report:  9/25/24   Date of Evaluation: 9/25/24   Patient Name: Rach Henderson   YOB: 1960   Date of Onset:  Ongoing   Primary Diagnosis: Dysphagia   Treatment Diagnosis: Dysphagia   Ordering provider: MD Wild Davis    IMPRESSIONS/RESULTS:   Oral & pharyngeal stages WFL, no penetration, no aspiration observed. Trace stasis in the valleculae with regular solid cleared after subsequent swallow and liquid trials.   RECOMMENDATIONS:   1) Regular with Thin liquids, as tolerated   2) Aspiration precautions, Reflux precautions   3) Oral care   4) Upright during and 1 hour after eating/drinking   5) Small bites/sips, alternate foods/liquids, utilize extra condiments to moisten dry foods, utilize puree (yogurt, applesauce, pudding with dry textured pills, as needed)   6) Follow up with referring ENT, as directed    7) Follow up with GI due to consistent report of globus sensation after swallowing   8) Follow up with Pulmonologist, as directed   9) Consider FEES for further assessment of laryngeal function during deglutition and to R/O LPR   10) Consider voice evaluation due to history of PVFM   11) Dysphagia therapy not warranted as swallow function WFL  HISTORY:     This 63 year old female was seen this morning for a Modified Barium Swallow Study to: _x_rule out aspiration; _x_assess for diet texture change as appropriate; and/or _x_explore positional strategies and/or compensatory techniques to eliminate penetration/aspiration.  The physician ordered this procedure because they want the patient to meet their nutrition/hydration needs by mouth without compromising respiratory status.   Patient arrived to [facility] Radiology suite independently and served as a reliable historian.  Patient reported a history of reflux and has been on Pantoprazole for 1 year.  She endorsed difficulty swallowing in which if she "chews on something big she gags", this has been going on for a year, however, patient has not been avoiding specific foods.  She endorsed difficulty swallowing pills and had to stop taking her multi vitamin.  Patient confirmed history of PVFM, reported shouting can trigger episodes, denied history of speech therapy.  Patient reported recent diagnosis of KIMBERLEY and use of CPAP, prior to this diagnosis, her baseline SpO2 was 89% and was on supplemental O2.  Patient reported improvement in choking episodes and SpO2 after diagnosis of KIMBERLEY and use of CPAP, and supplemental O2 was discontinued.  She reports current baseline SpO2 is 95% and has an elevated baseline HR of .  Patient confirmed recent Endoscopy in June which she reported as "normal, and has told she has a small airway, which could be scar tissue issue from thyroid removal.  The doctor said her throat spasmed when scope was put in".  Patient denied food allergies, recent pneumonia or unintentional weight loss.  She reported maintaining a regular diet with thin liquids.   Per ENT note 8/23/24: "RACH HENDERSON is a 63 year old woman who presents to the [facility] with dysphagia and PVFM.  She is referred by Dr. Yajaira Ayala who last saw the patient on 8/7/24.  Reports gasping episodes has been going on for 5 months. Only provoked by eating, talking, or sneezing.  Dysphagia for several years only to solids.  States she started with gasping and choking on air and hard foods. She went to Dr. Shah (pul) and was newly dx with KIMBERLEY and now on CPAP which is helping with gasping and choking episodes at night.  During the day she continues to have intermittent gasping and choking.  She notes periods of normal voicing.  She notes specific difficulties with swallowing with harder foods, tolerating thin liquids. Has not changed diet, continues to eat solid foods. No prior MBS.  She notes frequent classic heartburn symptoms, taking pantoprazole 2x a day  Smoking history: None  They note a cough for the past 5 months.  Specific triggers for the cough include: sneezing, speaking, and at random times.  They note severe paroxysms of cough.  They note coughing as they lie down for bed. But she is now elevating HOB with 3 pillows.  They note being awakened from sleep by this cough frequently.  They have tried the following in an attempt to treat the cough: None  They are not maintained on an ACE inhibitor.  VOCAL DEMANDS: Her vocal demands are primarily those of conversational, retired .  PREVIOUS STUDIES:  GI Endoscopy 06/17/24  Sleep study 07/29/24   CT Chest 05/24/24:  The lungs are clear. No abnormal tracheobronchial narrowing with dynamic expiration  Laryngoscopy: Procedure:   Exam: This demonstrates a clear vallecula and crisp epiglottis. The aryepiglottic folds are intact and symmetric bilaterally. The hypopharynx does not demonstrate pooling. The interarytenoid space demonstrates no lesions. It does not demonstrate pachydermia. The false vocal folds are symmetric and without lesions or masses. False fold voicing (plica ventricularis) is not noted today. The true vocal folds show normal and symmetric motion bilaterally. There is no paradoxical motion. The right medial edge is crisp and shows no lesions or masses. The left medial edge is crisp and shows no lesions or masses. The mucosal covering is minimally edematous. There is not erythema present today. There are no obvious vascular ectasias present. The vocal processes do not demonstrate granulomas. The subglottis and proximal trachea is clear and unobstructed to the limits of the examination today.    Per EMR (confirmed with patient):   "Active Problems   Abnormal EKG (794.31) (R94.31)   Abnormal liver enzymes (790.5) (R74.8)   Acid reflux disease (530.81) (K21.9)   Acute left-sided low back pain with left-sided sciatica (724.2,724.3) (M54.42)   Anatomical narrow angle glaucoma (365.02) (H40.039)   Anterior neck pain (723.1) (M54.2)   Anxiety (300.00) (F41.9)   Arthralgia (719.40) (M25.50)   Chronic sinusitis of both maxillary sinuses (473.0) (J32.0)   Community acquired pneumonia (486) (J18.9)   Cough (786.2) (R05.9)   Current chronic use of systemic steroids (V58.65) (Z79.52)   Difficulty sleeping (780.50) (G47.9)   Diverticulosis of large intestine without hemorrhage (562.10) (K57.30)   DNS (deviated nasal septum) (470) (J34.2)   Dysphagia, unspecified type (787.20) (R13.10)   Erythrocytosis (289.0) (D75.1)   ETD (Eustachian tube dysfunction), right (381.81) (H69.91)   Facial pain, atypical (350.2) (G50.1)   Fatigue (780.79) (R53.83)   Hammer toe of second toe of right foot (735.4) (M20.41)   History of hypertension (V12.59) (Z86.79)   Hoarseness (784.42) (R49.0)   Hypercalcemia (275.42) (E83.52)   Hypercholesterolemia (272.0) (E78.00)   Hyperproteinemia (273.8) (E88.09)   Hypertension (401.9) (I10)   Hypothyroidism (244.9) (E03.9)   Insomnia, unspecified type (780.52) (G47.00)   Interdigital neuroma of right foot (355.6) (G57.81)   Low vitamin D level (790.6) (R79.89)   LPRD (laryngopharyngeal reflux disease) (478.79) (K21.9)   Metatarsalgia of right foot (726.70) (M77.41)   Morbid obesity (278.01) (E66.01)   Muscle pain (729.1) (M79.10)   Myalgia (729.1) (M79.10)   Nasal congestion (478.19) (R09.81)   Nocturnal hypoxemia (327.24) (G47.34)   Nuclear senile cataract of both eyes (366.16) (H25.13)   Odynophagia (787.20) (R13.10)   KIMBERLEY (obstructive sleep apnea) (327.23) (G47.33)   Osteoarthritis of lumbar spine (721.3) (M47.816)   Other sinusitis (473.8) (J32.9)   Pancreas divisum (751.7) (Q45.3)   Pancreatitis (577.0) (K85.90)   Polymyalgia rheumatica (725) (M35.3)   Preop cardiovascular exam (V72.81) (Z01.810)   Preoperative clearance (V72.84) (Z01.818)   Primary osteoarthritis of left knee (715.16) (M17.12)   Scoliosis (737.30) (M41.9)   Screening for colorectal cancer (V76.51,V76.41) (Z12.11,Z12.12)   Screening for viral disease (V73.99) (Z11.59)   Sepsis (038.9,995.91) (A41.9)   Sleep disorder breathing (780.59) (G47.30)   Status post total left knee replacement (V43.65) (Z96.652)   Status post total right knee replacement (V43.65) (Z96.651)   Thyroid disorder (246.9) (E07.9)   Past Medical History   History of headache (V13.89) (Z87.898)   History of mammogram (V15.89) (Z92.89)   History of Knee pain (719.46) (M25.569)   History of Papilloma of right breast (217) (D24.1)     Surgical History   History of Arthroscopy Knee Right   History of Cholecystectomy Laparoscopic   History of Knee replacement   History of Near-Total Thyroidectomy   History of Primary Repair Of Ruptured Achilles Tendon   History of Uterine Myomectomy Abdominal Approach"       OBSERVATIONS:   Patient received in Radiology, stood in lateral view for study and AP view for Esophageal screen. Patient was A&A Ox4, on RA, SpO2 95-98%, -103 which is baseline per pt, she reported feeling nervous, education and support provided, patient was in NAD,  pain scale 0/10 pre & post MBS.     Oral peripheral examination revealed +facial symmetry, labial/lingual ROM/strength WFL, +adequate dentition, +velar movement   CONSISTENCIES ADMINISTERED:   Solids:  Puree, Regular solid   Liquids: Thin liquid   SUMMARY & IMPRESSION   Preliminary Videofluoroscopic Findings:   1) +Velopharyngeal movement noted   Videofluoroscopic Evaluation Reveals:    1) Oral stage WFL.    2) Pharyngeal stage WFL for puree, regular solid and thin liquids.  No penetration, no aspiration observed.  +Trace stasis in the valleculae with regular solid which cleared after subsequent swallow and liquid trials.  No other pharyngeal stasis observed.  Patient with consistent report of globus sensation, pointed to mid pharynx after all trials with slight improvement after liquid trials.   3) Suspect cricopharyngeal bar at approximately C5, however, not observed to impact swallow function.  4) An Esophageal screen was performed in AP view.  Patient accepted regular and thin liquids.  Bolus' coursed through the oropharynx and esophagus, without evidence of hold up.  Patient accepted a barium tablet however, was unable to elicit swallow due to report of dry texture of tablet, tablet expectorated by patient, unable to be assessed.  This cannot be considered a formal assessment of the esophagus.     Aspiration - Penetration Scale:    1-Puree, Regular solid, Thin liquids  Aspiration-Penetration Scale Key: (Chanda et al Dysphagia 11: 93-98 (April 1996), Aspiration-Penetration Scale)   1. Material does not enter the airway   2. Material enters the airway, remains above the vocal folds and is ejected from the airway   3. Material enters the airway, remains above the vocal folds and is not ejected from the airway   4. Material enters the airway, contacts the vocal folds and is ejected from the airway   5. Material enters the airway, contacts the vocal folds and is not ejected from the airway    6. Material enters the airway, passes below the vocal folds and is ejected from the airway (aspiration)   7. Material enters the airway, passes below the vocal folds and is not ejected from the airway despite effort (aspiration)   8. Material enters the airway, passes below the vocal folds and no effort is made to eject (silent aspiration)   The above results and recommendations have been reviewed with the patient who verbalized understanding.  Should you have any additional questions, please contact the [facility] at [phone].   Dr. Davis made aware of patient reported baseline HR.  Bertha Price M.A., CCC-SLP

## 2024-09-30 ENCOUNTER — NON-APPOINTMENT (OUTPATIENT)
Age: 64
End: 2024-09-30

## 2024-11-04 ENCOUNTER — APPOINTMENT (OUTPATIENT)
Dept: OPHTHALMOLOGY | Facility: CLINIC | Age: 64
End: 2024-11-04
Payer: MEDICARE

## 2024-11-04 ENCOUNTER — NON-APPOINTMENT (OUTPATIENT)
Age: 64
End: 2024-11-04

## 2024-11-04 PROCEDURE — 99214 OFFICE O/P EST MOD 30 MIN: CPT

## 2024-11-04 PROCEDURE — 92020 GONIOSCOPY: CPT

## 2024-11-04 PROCEDURE — 76513 OPH US DX ANT SGM US UNI/BI: CPT | Mod: 50

## 2024-12-09 ENCOUNTER — RX RENEWAL (OUTPATIENT)
Age: 64
End: 2024-12-09

## 2024-12-11 ENCOUNTER — NON-APPOINTMENT (OUTPATIENT)
Age: 64
End: 2024-12-11

## 2024-12-11 ENCOUNTER — APPOINTMENT (OUTPATIENT)
Dept: OPHTHALMOLOGY | Facility: CLINIC | Age: 64
End: 2024-12-11
Payer: MEDICARE

## 2024-12-11 PROCEDURE — 66761 REVISION OF IRIS: CPT | Mod: RT

## 2024-12-17 NOTE — REASON FOR VISIT
Patient tolerated the procedure well and is comfortable with no complaints of pain. Vital signs stable. Arousable prior to transport. Patient transported to PACU via stretcher. Handoff completed.  [FreeTextEntry2] : new injury

## 2024-12-26 ENCOUNTER — NON-APPOINTMENT (OUTPATIENT)
Age: 64
End: 2024-12-26

## 2024-12-26 ENCOUNTER — APPOINTMENT (OUTPATIENT)
Dept: OPHTHALMOLOGY | Facility: CLINIC | Age: 64
End: 2024-12-26
Payer: MEDICARE

## 2024-12-26 PROCEDURE — 66761 REVISION OF IRIS: CPT | Mod: LT

## 2025-01-01 ENCOUNTER — RX RENEWAL (OUTPATIENT)
Age: 65
End: 2025-01-01

## 2025-01-16 ENCOUNTER — NON-APPOINTMENT (OUTPATIENT)
Age: 65
End: 2025-01-16

## 2025-01-16 ENCOUNTER — APPOINTMENT (OUTPATIENT)
Dept: RHEUMATOLOGY | Facility: CLINIC | Age: 65
End: 2025-01-16

## 2025-01-16 VITALS
OXYGEN SATURATION: 100 % | WEIGHT: 246 LBS | BODY MASS INDEX: 40.98 KG/M2 | HEIGHT: 65 IN | HEART RATE: 90 BPM | DIASTOLIC BLOOD PRESSURE: 83 MMHG | RESPIRATION RATE: 20 BRPM | SYSTOLIC BLOOD PRESSURE: 123 MMHG

## 2025-01-16 DIAGNOSIS — M25.50 PAIN IN UNSPECIFIED JOINT: ICD-10-CM

## 2025-01-16 PROCEDURE — 99214 OFFICE O/P EST MOD 30 MIN: CPT

## 2025-01-17 LAB
ALBUMIN SERPL ELPH-MCNC: 4.2 G/DL
ALP BLD-CCNC: 110 U/L
ALT SERPL-CCNC: 33 U/L
ANION GAP SERPL CALC-SCNC: 13 MMOL/L
AST SERPL-CCNC: 21 U/L
BASOPHILS # BLD AUTO: 0.02 K/UL
BASOPHILS NFR BLD AUTO: 0.3 %
BILIRUB SERPL-MCNC: 0.3 MG/DL
BUN SERPL-MCNC: 28 MG/DL
CALCIUM SERPL-MCNC: 9.5 MG/DL
CHLORIDE SERPL-SCNC: 103 MMOL/L
CO2 SERPL-SCNC: 26 MMOL/L
CREAT SERPL-MCNC: 0.85 MG/DL
CRP SERPL-MCNC: 5 MG/L
EGFR: 76 ML/MIN/1.73M2
EOSINOPHIL # BLD AUTO: 0.12 K/UL
EOSINOPHIL NFR BLD AUTO: 1.8 %
ERYTHROCYTE [SEDIMENTATION RATE] IN BLOOD BY WESTERGREN METHOD: 10 MM/HR
HCT VFR BLD CALC: 39.8 %
HGB BLD-MCNC: 13 G/DL
IMM GRANULOCYTES NFR BLD AUTO: 0.4 %
LYMPHOCYTES # BLD AUTO: 1.37 K/UL
LYMPHOCYTES NFR BLD AUTO: 20.1 %
MAN DIFF?: NORMAL
MCHC RBC-ENTMCNC: 27.5 PG
MCHC RBC-ENTMCNC: 32.7 G/DL
MCV RBC AUTO: 84.3 FL
MONOCYTES # BLD AUTO: 0.54 K/UL
MONOCYTES NFR BLD AUTO: 7.9 %
NEUTROPHILS # BLD AUTO: 4.75 K/UL
NEUTROPHILS NFR BLD AUTO: 69.5 %
PLATELET # BLD AUTO: 279 K/UL
POTASSIUM SERPL-SCNC: 4.1 MMOL/L
PROT SERPL-MCNC: 6.7 G/DL
RBC # BLD: 4.72 M/UL
RBC # FLD: 13.9 %
SODIUM SERPL-SCNC: 142 MMOL/L
WBC # FLD AUTO: 6.83 K/UL

## 2025-01-21 ENCOUNTER — APPOINTMENT (OUTPATIENT)
Dept: RADIOLOGY | Facility: CLINIC | Age: 65
End: 2025-01-21
Payer: MEDICARE

## 2025-01-21 PROCEDURE — 73521 X-RAY EXAM HIPS BI 2 VIEWS: CPT

## 2025-01-23 ENCOUNTER — RX RENEWAL (OUTPATIENT)
Age: 65
End: 2025-01-23

## 2025-01-23 ENCOUNTER — APPOINTMENT (OUTPATIENT)
Dept: OPHTHALMOLOGY | Facility: CLINIC | Age: 65
End: 2025-01-23
Payer: MEDICARE

## 2025-01-23 ENCOUNTER — NON-APPOINTMENT (OUTPATIENT)
Age: 65
End: 2025-01-23

## 2025-01-23 PROCEDURE — 92012 INTRM OPH EXAM EST PATIENT: CPT

## 2025-01-24 ENCOUNTER — RX RENEWAL (OUTPATIENT)
Age: 65
End: 2025-01-24

## 2025-01-24 DIAGNOSIS — R92.30 DENSE BREASTS, UNSPECIFIED: ICD-10-CM

## 2025-01-29 ENCOUNTER — APPOINTMENT (OUTPATIENT)
Dept: OPHTHALMOLOGY | Facility: CLINIC | Age: 65
End: 2025-01-29
Payer: MEDICARE

## 2025-01-29 ENCOUNTER — NON-APPOINTMENT (OUTPATIENT)
Age: 65
End: 2025-01-29

## 2025-01-29 PROCEDURE — 92014 COMPRE OPH EXAM EST PT 1/>: CPT

## 2025-01-29 PROCEDURE — 76514 ECHO EXAM OF EYE THICKNESS: CPT

## 2025-01-29 PROCEDURE — 92020 GONIOSCOPY: CPT

## 2025-02-06 ENCOUNTER — APPOINTMENT (OUTPATIENT)
Dept: PULMONOLOGY | Facility: CLINIC | Age: 65
End: 2025-02-06
Payer: MEDICARE

## 2025-02-06 VITALS
HEART RATE: 92 BPM | TEMPERATURE: 98.3 F | SYSTOLIC BLOOD PRESSURE: 124 MMHG | DIASTOLIC BLOOD PRESSURE: 74 MMHG | OXYGEN SATURATION: 98 %

## 2025-02-06 DIAGNOSIS — G47.33 OBSTRUCTIVE SLEEP APNEA (ADULT) (PEDIATRIC): ICD-10-CM

## 2025-02-06 DIAGNOSIS — G47.34 IDIOPATHIC SLEEP RELATED NONOBSTRUCTIVE ALVEOLAR HYPOVENTILATION: ICD-10-CM

## 2025-02-06 DIAGNOSIS — E66.01 MORBID (SEVERE) OBESITY DUE TO EXCESS CALORIES: ICD-10-CM

## 2025-02-06 PROCEDURE — G2211 COMPLEX E/M VISIT ADD ON: CPT

## 2025-02-06 PROCEDURE — 99214 OFFICE O/P EST MOD 30 MIN: CPT

## 2025-02-24 ENCOUNTER — RX RENEWAL (OUTPATIENT)
Age: 65
End: 2025-02-24

## 2025-03-14 ENCOUNTER — RX RENEWAL (OUTPATIENT)
Age: 65
End: 2025-03-14

## 2025-04-01 ENCOUNTER — APPOINTMENT (OUTPATIENT)
Dept: OPHTHALMOLOGY | Facility: CLINIC | Age: 65
End: 2025-04-01
Payer: MEDICARE

## 2025-04-01 ENCOUNTER — NON-APPOINTMENT (OUTPATIENT)
Age: 65
End: 2025-04-01

## 2025-04-01 PROCEDURE — 92012 INTRM OPH EXAM EST PATIENT: CPT

## 2025-04-09 NOTE — H&P PST ADULT - DENTITION
- Reports sugars to Diabetes in Pregnancy       Lab Results   Component Value Date    HGBA1C 5.2 10/22/2024             normal

## 2025-05-12 ENCOUNTER — RX RENEWAL (OUTPATIENT)
Age: 65
End: 2025-05-12

## 2025-05-13 ENCOUNTER — RX RENEWAL (OUTPATIENT)
Age: 65
End: 2025-05-13

## 2025-05-14 ENCOUNTER — APPOINTMENT (OUTPATIENT)
Dept: PULMONOLOGY | Facility: CLINIC | Age: 65
End: 2025-05-14
Payer: MEDICARE

## 2025-05-14 VITALS — OXYGEN SATURATION: 98 % | HEART RATE: 98 BPM | SYSTOLIC BLOOD PRESSURE: 139 MMHG | DIASTOLIC BLOOD PRESSURE: 80 MMHG

## 2025-05-14 DIAGNOSIS — G47.33 OBSTRUCTIVE SLEEP APNEA (ADULT) (PEDIATRIC): ICD-10-CM

## 2025-05-14 DIAGNOSIS — G47.34 IDIOPATHIC SLEEP RELATED NONOBSTRUCTIVE ALVEOLAR HYPOVENTILATION: ICD-10-CM

## 2025-05-14 DIAGNOSIS — M35.3 POLYMYALGIA RHEUMATICA: ICD-10-CM

## 2025-05-14 LAB — POCT - HEMOGLOBIN (HGB), QUANTITATIVE, TRANSCUTANEOUS: 14

## 2025-05-14 PROCEDURE — 94729 DIFFUSING CAPACITY: CPT

## 2025-05-14 PROCEDURE — 88738 HGB QUANT TRANSCUTANEOUS: CPT

## 2025-05-14 PROCEDURE — ZZZZZ: CPT

## 2025-05-14 PROCEDURE — 94727 GAS DIL/WSHOT DETER LNG VOL: CPT

## 2025-05-14 PROCEDURE — 94010 BREATHING CAPACITY TEST: CPT

## 2025-05-14 PROCEDURE — 99214 OFFICE O/P EST MOD 30 MIN: CPT | Mod: 25

## 2025-05-14 PROCEDURE — 71046 X-RAY EXAM CHEST 2 VIEWS: CPT

## 2025-06-11 ENCOUNTER — RX RENEWAL (OUTPATIENT)
Age: 65
End: 2025-06-11

## 2025-06-12 ENCOUNTER — RX RENEWAL (OUTPATIENT)
Age: 65
End: 2025-06-12

## 2025-07-11 ENCOUNTER — RX RENEWAL (OUTPATIENT)
Age: 65
End: 2025-07-11

## 2025-07-22 ENCOUNTER — NON-APPOINTMENT (OUTPATIENT)
Age: 65
End: 2025-07-22

## 2025-07-22 ENCOUNTER — APPOINTMENT (OUTPATIENT)
Dept: OPHTHALMOLOGY | Facility: CLINIC | Age: 65
End: 2025-07-22
Payer: MEDICARE

## 2025-07-22 PROCEDURE — 92012 INTRM OPH EXAM EST PATIENT: CPT

## 2025-07-30 ENCOUNTER — NON-APPOINTMENT (OUTPATIENT)
Age: 65
End: 2025-07-30

## 2025-07-30 ENCOUNTER — APPOINTMENT (OUTPATIENT)
Dept: OPHTHALMOLOGY | Facility: CLINIC | Age: 65
End: 2025-07-30
Payer: MEDICARE

## 2025-07-30 PROCEDURE — 92012 INTRM OPH EXAM EST PATIENT: CPT

## 2025-07-30 PROCEDURE — 92133 CPTRZD OPH DX IMG PST SGM ON: CPT

## 2025-08-06 ENCOUNTER — RX RENEWAL (OUTPATIENT)
Age: 65
End: 2025-08-06

## 2025-08-12 ENCOUNTER — RX RENEWAL (OUTPATIENT)
Age: 65
End: 2025-08-12

## 2025-08-14 ENCOUNTER — APPOINTMENT (OUTPATIENT)
Dept: PULMONOLOGY | Facility: CLINIC | Age: 65
End: 2025-08-14
Payer: MEDICARE

## 2025-08-14 VITALS — DIASTOLIC BLOOD PRESSURE: 77 MMHG | SYSTOLIC BLOOD PRESSURE: 131 MMHG | OXYGEN SATURATION: 97 % | HEART RATE: 87 BPM

## 2025-08-14 DIAGNOSIS — G47.33 OBSTRUCTIVE SLEEP APNEA (ADULT) (PEDIATRIC): ICD-10-CM

## 2025-08-14 PROCEDURE — 99213 OFFICE O/P EST LOW 20 MIN: CPT

## 2025-08-14 PROCEDURE — G2211 COMPLEX E/M VISIT ADD ON: CPT

## 2025-08-18 ENCOUNTER — RX RENEWAL (OUTPATIENT)
Age: 65
End: 2025-08-18

## 2025-09-12 ENCOUNTER — RX RENEWAL (OUTPATIENT)
Age: 65
End: 2025-09-12

## 2025-09-15 ENCOUNTER — RX RENEWAL (OUTPATIENT)
Age: 65
End: 2025-09-15

## (undated) DEVICE — CONTAINER FORMALIN 80ML YELLOW

## (undated) DEVICE — BASIN EMESIS 10IN GRADUATED MAUVE

## (undated) DEVICE — ELCTR ECG CONDUCTIVE ADHESIVE

## (undated) DEVICE — CONTAINER FORMALIN 10% 20ML

## (undated) DEVICE — DRSG BANDAID 0.75X3"

## (undated) DEVICE — UNDERPAD LINEN SAVER 17 X 24"

## (undated) DEVICE — BITE BLOCK ADULT 20 X 27MM (GREEN)

## (undated) DEVICE — BIOPSY FORCEP COLD DISP

## (undated) DEVICE — CLAMP BX HOT RAD JAW 3

## (undated) DEVICE — DRSG 2X2

## (undated) DEVICE — GOWN LG

## (undated) DEVICE — SALIVA EJECTOR (BLUE)

## (undated) DEVICE — CATH IV SAFE BC 22G X 1" (BLUE)

## (undated) DEVICE — TUBING MEDI-VAC W MAXIGRIP CONNECTORS 1/4"X6'

## (undated) DEVICE — PACK IV START WITH CHG

## (undated) DEVICE — DRSG CURITY GAUZE SPONGE 4 X 4" 12-PLY NON-STERILE

## (undated) DEVICE — BIOPSY FORCEP RADIAL JAW 4 STANDARD WITH NEEDLE

## (undated) DEVICE — TUBING IV SET GRAVITY 3Y 100" MACRO

## (undated) DEVICE — DENTURE CUP PINK

## (undated) DEVICE — LUBRICATING JELLY HR ONE SHOT 3G